# Patient Record
Sex: FEMALE | Race: BLACK OR AFRICAN AMERICAN | NOT HISPANIC OR LATINO | Employment: UNEMPLOYED | URBAN - NONMETROPOLITAN AREA
[De-identification: names, ages, dates, MRNs, and addresses within clinical notes are randomized per-mention and may not be internally consistent; named-entity substitution may affect disease eponyms.]

---

## 2021-12-13 ENCOUNTER — APPOINTMENT (EMERGENCY)
Dept: CT IMAGING | Facility: HOSPITAL | Age: 56
DRG: 720 | End: 2021-12-13
Payer: COMMERCIAL

## 2021-12-13 ENCOUNTER — HOSPITAL ENCOUNTER (INPATIENT)
Facility: HOSPITAL | Age: 56
LOS: 5 days | Discharge: HOME | DRG: 720 | End: 2021-12-18
Attending: EMERGENCY MEDICINE | Admitting: STUDENT IN AN ORGANIZED HEALTH CARE EDUCATION/TRAINING PROGRAM
Payer: COMMERCIAL

## 2021-12-13 ENCOUNTER — APPOINTMENT (EMERGENCY)
Dept: RADIOLOGY | Facility: HOSPITAL | Age: 56
DRG: 720 | End: 2021-12-13
Payer: COMMERCIAL

## 2021-12-13 DIAGNOSIS — N17.9 AKI (ACUTE KIDNEY INJURY) (HCC): ICD-10-CM

## 2021-12-13 DIAGNOSIS — J18.9 PNEUMONIA: ICD-10-CM

## 2021-12-13 DIAGNOSIS — R09.02 HYPOXIA: Primary | ICD-10-CM

## 2021-12-13 DIAGNOSIS — U07.1 COVID-19 VIRUS INFECTION: ICD-10-CM

## 2021-12-13 PROBLEM — J96.01 ACUTE RESPIRATORY FAILURE WITH HYPOXIA (HCC): Status: ACTIVE | Noted: 2021-12-13

## 2021-12-13 PROBLEM — E87.1 HYPONATREMIA: Status: ACTIVE | Noted: 2021-12-13

## 2021-12-13 PROBLEM — A41.9 SEPSIS (HCC): Status: ACTIVE | Noted: 2021-12-13

## 2021-12-13 PROBLEM — J12.82 PNEUMONIA DUE TO COVID-19 VIRUS: Status: ACTIVE | Noted: 2021-12-13

## 2021-12-13 LAB
2HR DELTA HS TROPONIN: 3 NG/L
4HR DELTA HS TROPONIN: -3 NG/L
ALBUMIN SERPL BCP-MCNC: 2.9 G/DL (ref 3.5–5)
ALP SERPL-CCNC: 79 U/L (ref 46–116)
ALT SERPL W P-5'-P-CCNC: 32 U/L (ref 12–78)
ANION GAP SERPL CALCULATED.3IONS-SCNC: 13 MMOL/L (ref 4–13)
ANISOCYTOSIS BLD QL SMEAR: PRESENT
APTT PPP: 34 SECONDS (ref 23–37)
APTT PPP: 47 SECONDS (ref 23–37)
AST SERPL W P-5'-P-CCNC: 49 U/L (ref 5–45)
ATRIAL RATE: 111 BPM
BASOPHILS # BLD MANUAL: 0 THOUSAND/UL (ref 0–0.1)
BASOPHILS NFR MAR MANUAL: 0 % (ref 0–1)
BILIRUB SERPL-MCNC: 0.46 MG/DL (ref 0.2–1)
BUN SERPL-MCNC: 21 MG/DL (ref 5–25)
CALCIUM ALBUM COR SERPL-MCNC: 9.5 MG/DL (ref 8.3–10.1)
CALCIUM SERPL-MCNC: 8.6 MG/DL (ref 8.3–10.1)
CARDIAC TROPONIN I PNL SERPL HS: 25 NG/L
CARDIAC TROPONIN I PNL SERPL HS: 28 NG/L
CARDIAC TROPONIN I PNL SERPL HS: 31 NG/L
CHLORIDE SERPL-SCNC: 98 MMOL/L (ref 100–108)
CK MB SERPL-MCNC: 3.4 NG/ML (ref 0–5)
CK MB SERPL-MCNC: <1 % (ref 0–2.5)
CK SERPL-CCNC: 717 U/L (ref 26–192)
CO2 SERPL-SCNC: 23 MMOL/L (ref 21–32)
CREAT SERPL-MCNC: 1.69 MG/DL (ref 0.6–1.3)
CRP SERPL QL: 130.4 MG/L
D DIMER PPP FEU-MCNC: 3.54 UG/ML FEU
EOSINOPHIL # BLD MANUAL: 0.11 THOUSAND/UL (ref 0–0.4)
EOSINOPHIL NFR BLD MANUAL: 1 % (ref 0–6)
ERYTHROCYTE [DISTWIDTH] IN BLOOD BY AUTOMATED COUNT: 14.8 % (ref 11.6–15.1)
ERYTHROCYTE [DISTWIDTH] IN BLOOD BY AUTOMATED COUNT: 14.8 % (ref 11.6–15.1)
FLUAV RNA RESP QL NAA+PROBE: NEGATIVE
FLUBV RNA RESP QL NAA+PROBE: NEGATIVE
GFR SERPL CREATININE-BSD FRML MDRD: 38 ML/MIN/1.73SQ M
GLUCOSE SERPL-MCNC: 112 MG/DL (ref 65–140)
GLUCOSE SERPL-MCNC: 122 MG/DL (ref 65–140)
GLUCOSE SERPL-MCNC: 131 MG/DL (ref 65–140)
GLUCOSE SERPL-MCNC: 165 MG/DL (ref 65–140)
HBV CORE AB SER QL: NORMAL
HBV CORE IGM SER QL: NORMAL
HBV SURFACE AG SER QL: NORMAL
HCT VFR BLD AUTO: 31.3 % (ref 34.8–46.1)
HCT VFR BLD AUTO: 33 % (ref 34.8–46.1)
HCV AB SER QL: NORMAL
HGB BLD-MCNC: 10.2 G/DL (ref 11.5–15.4)
HGB BLD-MCNC: 10.7 G/DL (ref 11.5–15.4)
HIV 1+2 AB+HIV1 P24 AG SERPL QL IA: NORMAL
HIV1 P24 AG SER QL: NORMAL
INR PPP: 1.15 (ref 0.84–1.19)
INR PPP: 1.23 (ref 0.84–1.19)
LACTATE SERPL-SCNC: 1.4 MMOL/L (ref 0.5–2)
LG PLATELETS BLD QL SMEAR: PRESENT
LYMPHOCYTES # BLD AUTO: 1.37 THOUSAND/UL (ref 0.6–4.47)
LYMPHOCYTES # BLD AUTO: 13 % (ref 14–44)
MAGNESIUM SERPL-MCNC: 1.4 MG/DL (ref 1.6–2.6)
MCH RBC QN AUTO: 29.2 PG (ref 26.8–34.3)
MCH RBC QN AUTO: 29.4 PG (ref 26.8–34.3)
MCHC RBC AUTO-ENTMCNC: 32.4 G/DL (ref 31.4–37.4)
MCHC RBC AUTO-ENTMCNC: 32.6 G/DL (ref 31.4–37.4)
MCV RBC AUTO: 90 FL (ref 82–98)
MCV RBC AUTO: 90 FL (ref 82–98)
MONOCYTES # BLD AUTO: 0.11 THOUSAND/UL (ref 0–1.22)
MONOCYTES NFR BLD: 1 % (ref 4–12)
NEUTROPHILS # BLD MANUAL: 8.97 THOUSAND/UL (ref 1.85–7.62)
NEUTS BAND NFR BLD MANUAL: 3 % (ref 0–8)
NEUTS SEG NFR BLD AUTO: 82 % (ref 43–75)
NT-PROBNP SERPL-MCNC: 211 PG/ML
P AXIS: 73 DEGREES
PLATELET # BLD AUTO: 221 THOUSANDS/UL (ref 149–390)
PLATELET # BLD AUTO: 239 THOUSANDS/UL (ref 149–390)
PLATELET BLD QL SMEAR: ADEQUATE
PMV BLD AUTO: 10 FL (ref 8.9–12.7)
PMV BLD AUTO: 10.3 FL (ref 8.9–12.7)
POTASSIUM SERPL-SCNC: 3.4 MMOL/L (ref 3.5–5.3)
PR INTERVAL: 162 MS
PROCALCITONIN SERPL-MCNC: 0.12 NG/ML
PROT SERPL-MCNC: 7.5 G/DL (ref 6.4–8.2)
PROTHROMBIN TIME: 14.5 SECONDS (ref 11.6–14.5)
PROTHROMBIN TIME: 15.4 SECONDS (ref 11.6–14.5)
QRS AXIS: -49 DEGREES
QRSD INTERVAL: 140 MS
QT INTERVAL: 356 MS
QTC INTERVAL: 484 MS
RBC # BLD AUTO: 3.47 MILLION/UL (ref 3.81–5.12)
RBC # BLD AUTO: 3.67 MILLION/UL (ref 3.81–5.12)
RBC MORPH BLD: PRESENT
RSV RNA RESP QL NAA+PROBE: NEGATIVE
SARS-COV-2 RNA RESP QL NAA+PROBE: POSITIVE
SODIUM SERPL-SCNC: 134 MMOL/L (ref 136–145)
T WAVE AXIS: 62 DEGREES
VENTRICULAR RATE: 111 BPM
WBC # BLD AUTO: 10.55 THOUSAND/UL (ref 4.31–10.16)
WBC # BLD AUTO: 10.81 THOUSAND/UL (ref 4.31–10.16)

## 2021-12-13 PROCEDURE — 0241U HB NFCT DS VIR RESP RNA 4 TRGT: CPT | Performed by: EMERGENCY MEDICINE

## 2021-12-13 PROCEDURE — 82550 ASSAY OF CK (CPK): CPT | Performed by: EMERGENCY MEDICINE

## 2021-12-13 PROCEDURE — 83880 ASSAY OF NATRIURETIC PEPTIDE: CPT | Performed by: EMERGENCY MEDICINE

## 2021-12-13 PROCEDURE — 86803 HEPATITIS C AB TEST: CPT | Performed by: STUDENT IN AN ORGANIZED HEALTH CARE EDUCATION/TRAINING PROGRAM

## 2021-12-13 PROCEDURE — 82553 CREATINE MB FRACTION: CPT | Performed by: EMERGENCY MEDICINE

## 2021-12-13 PROCEDURE — 99223 1ST HOSP IP/OBS HIGH 75: CPT | Performed by: STUDENT IN AN ORGANIZED HEALTH CARE EDUCATION/TRAINING PROGRAM

## 2021-12-13 PROCEDURE — 84145 PROCALCITONIN (PCT): CPT | Performed by: EMERGENCY MEDICINE

## 2021-12-13 PROCEDURE — 87806 HIV AG W/HIV1&2 ANTB W/OPTIC: CPT | Performed by: STUDENT IN AN ORGANIZED HEALTH CARE EDUCATION/TRAINING PROGRAM

## 2021-12-13 PROCEDURE — 80053 COMPREHEN METABOLIC PANEL: CPT | Performed by: EMERGENCY MEDICINE

## 2021-12-13 PROCEDURE — 71045 X-RAY EXAM CHEST 1 VIEW: CPT

## 2021-12-13 PROCEDURE — 86704 HEP B CORE ANTIBODY TOTAL: CPT | Performed by: STUDENT IN AN ORGANIZED HEALTH CARE EDUCATION/TRAINING PROGRAM

## 2021-12-13 PROCEDURE — 85610 PROTHROMBIN TIME: CPT | Performed by: STUDENT IN AN ORGANIZED HEALTH CARE EDUCATION/TRAINING PROGRAM

## 2021-12-13 PROCEDURE — 71275 CT ANGIOGRAPHY CHEST: CPT

## 2021-12-13 PROCEDURE — G1004 CDSM NDSC: HCPCS

## 2021-12-13 PROCEDURE — 87340 HEPATITIS B SURFACE AG IA: CPT | Performed by: STUDENT IN AN ORGANIZED HEALTH CARE EDUCATION/TRAINING PROGRAM

## 2021-12-13 PROCEDURE — 83735 ASSAY OF MAGNESIUM: CPT | Performed by: EMERGENCY MEDICINE

## 2021-12-13 PROCEDURE — 85027 COMPLETE CBC AUTOMATED: CPT | Performed by: EMERGENCY MEDICINE

## 2021-12-13 PROCEDURE — 87040 BLOOD CULTURE FOR BACTERIA: CPT | Performed by: EMERGENCY MEDICINE

## 2021-12-13 PROCEDURE — 87040 BLOOD CULTURE FOR BACTERIA: CPT | Performed by: STUDENT IN AN ORGANIZED HEALTH CARE EDUCATION/TRAINING PROGRAM

## 2021-12-13 PROCEDURE — 99285 EMERGENCY DEPT VISIT HI MDM: CPT

## 2021-12-13 PROCEDURE — 96365 THER/PROPH/DIAG IV INF INIT: CPT

## 2021-12-13 PROCEDURE — 85027 COMPLETE CBC AUTOMATED: CPT | Performed by: STUDENT IN AN ORGANIZED HEALTH CARE EDUCATION/TRAINING PROGRAM

## 2021-12-13 PROCEDURE — 82948 REAGENT STRIP/BLOOD GLUCOSE: CPT

## 2021-12-13 PROCEDURE — 85610 PROTHROMBIN TIME: CPT | Performed by: EMERGENCY MEDICINE

## 2021-12-13 PROCEDURE — 93005 ELECTROCARDIOGRAM TRACING: CPT

## 2021-12-13 PROCEDURE — 85730 THROMBOPLASTIN TIME PARTIAL: CPT | Performed by: STUDENT IN AN ORGANIZED HEALTH CARE EDUCATION/TRAINING PROGRAM

## 2021-12-13 PROCEDURE — XW033E5 INTRODUCTION OF REMDESIVIR ANTI-INFECTIVE INTO PERIPHERAL VEIN, PERCUTANEOUS APPROACH, NEW TECHNOLOGY GROUP 5: ICD-10-PCS | Performed by: FAMILY MEDICINE

## 2021-12-13 PROCEDURE — 84484 ASSAY OF TROPONIN QUANT: CPT | Performed by: EMERGENCY MEDICINE

## 2021-12-13 PROCEDURE — 83605 ASSAY OF LACTIC ACID: CPT | Performed by: EMERGENCY MEDICINE

## 2021-12-13 PROCEDURE — 96375 TX/PRO/DX INJ NEW DRUG ADDON: CPT

## 2021-12-13 PROCEDURE — 85379 FIBRIN DEGRADATION QUANT: CPT | Performed by: EMERGENCY MEDICINE

## 2021-12-13 PROCEDURE — 36415 COLL VENOUS BLD VENIPUNCTURE: CPT | Performed by: EMERGENCY MEDICINE

## 2021-12-13 PROCEDURE — 85007 BL SMEAR W/DIFF WBC COUNT: CPT | Performed by: EMERGENCY MEDICINE

## 2021-12-13 PROCEDURE — 87077 CULTURE AEROBIC IDENTIFY: CPT | Performed by: EMERGENCY MEDICINE

## 2021-12-13 PROCEDURE — 85025 COMPLETE CBC W/AUTO DIFF WBC: CPT | Performed by: EMERGENCY MEDICINE

## 2021-12-13 PROCEDURE — 96361 HYDRATE IV INFUSION ADD-ON: CPT

## 2021-12-13 PROCEDURE — 86140 C-REACTIVE PROTEIN: CPT | Performed by: EMERGENCY MEDICINE

## 2021-12-13 PROCEDURE — 99285 EMERGENCY DEPT VISIT HI MDM: CPT | Performed by: EMERGENCY MEDICINE

## 2021-12-13 PROCEDURE — 86705 HEP B CORE ANTIBODY IGM: CPT | Performed by: STUDENT IN AN ORGANIZED HEALTH CARE EDUCATION/TRAINING PROGRAM

## 2021-12-13 RX ORDER — ACETAMINOPHEN 325 MG/1
650 TABLET ORAL ONCE
Status: COMPLETED | OUTPATIENT
Start: 2021-12-13 | End: 2021-12-13

## 2021-12-13 RX ORDER — GABAPENTIN 100 MG/1
100 CAPSULE ORAL
COMMUNITY
Start: 2021-11-30

## 2021-12-13 RX ORDER — ATORVASTATIN CALCIUM 40 MG/1
40 TABLET, FILM COATED ORAL DAILY
COMMUNITY
Start: 2021-07-01

## 2021-12-13 RX ORDER — PYRIDOXINE HCL (VITAMIN B6) 50 MG
100 TABLET ORAL DAILY
Status: DISCONTINUED | OUTPATIENT
Start: 2021-12-13 | End: 2021-12-18 | Stop reason: HOSPADM

## 2021-12-13 RX ORDER — CEFTRIAXONE 1 G/50ML
1000 INJECTION, SOLUTION INTRAVENOUS ONCE
Status: COMPLETED | OUTPATIENT
Start: 2021-12-13 | End: 2021-12-13

## 2021-12-13 RX ORDER — BENZONATATE 100 MG/1
100 CAPSULE ORAL 3 TIMES DAILY PRN
Status: DISCONTINUED | OUTPATIENT
Start: 2021-12-13 | End: 2021-12-18 | Stop reason: HOSPADM

## 2021-12-13 RX ORDER — LEVOTHYROXINE SODIUM 0.1 MG/1
88 TABLET ORAL DAILY
COMMUNITY

## 2021-12-13 RX ORDER — ATORVASTATIN CALCIUM 40 MG/1
40 TABLET, FILM COATED ORAL DAILY
Status: DISCONTINUED | OUTPATIENT
Start: 2021-12-13 | End: 2021-12-18 | Stop reason: HOSPADM

## 2021-12-13 RX ORDER — DEXAMETHASONE SODIUM PHOSPHATE 4 MG/ML
6 INJECTION, SOLUTION INTRA-ARTICULAR; INTRALESIONAL; INTRAMUSCULAR; INTRAVENOUS; SOFT TISSUE EVERY 24 HOURS
Status: DISCONTINUED | OUTPATIENT
Start: 2021-12-14 | End: 2021-12-18 | Stop reason: HOSPADM

## 2021-12-13 RX ORDER — METOPROLOL SUCCINATE 50 MG/1
50 TABLET, EXTENDED RELEASE ORAL 2 TIMES DAILY
Status: DISCONTINUED | OUTPATIENT
Start: 2021-12-13 | End: 2021-12-14

## 2021-12-13 RX ORDER — TIZANIDINE 4 MG/1
1 TABLET ORAL EVERY 6 HOURS PRN
COMMUNITY
Start: 2021-12-08

## 2021-12-13 RX ORDER — TIZANIDINE 2 MG/1
4 TABLET ORAL EVERY 6 HOURS PRN
Status: DISCONTINUED | OUTPATIENT
Start: 2021-12-13 | End: 2021-12-18 | Stop reason: HOSPADM

## 2021-12-13 RX ORDER — BENZONATATE 100 MG/1
100 CAPSULE ORAL ONCE
Status: COMPLETED | OUTPATIENT
Start: 2021-12-13 | End: 2021-12-13

## 2021-12-13 RX ORDER — DEXAMETHASONE SODIUM PHOSPHATE 4 MG/ML
6 INJECTION, SOLUTION INTRA-ARTICULAR; INTRALESIONAL; INTRAMUSCULAR; INTRAVENOUS; SOFT TISSUE EVERY 24 HOURS
Status: DISCONTINUED | OUTPATIENT
Start: 2021-12-13 | End: 2021-12-13

## 2021-12-13 RX ORDER — DEXAMETHASONE SODIUM PHOSPHATE 4 MG/ML
6 INJECTION, SOLUTION INTRA-ARTICULAR; INTRALESIONAL; INTRAMUSCULAR; INTRAVENOUS; SOFT TISSUE ONCE
Status: COMPLETED | OUTPATIENT
Start: 2021-12-13 | End: 2021-12-13

## 2021-12-13 RX ORDER — HEPARIN SODIUM 10000 [USP'U]/100ML
3-20 INJECTION, SOLUTION INTRAVENOUS
Status: DISCONTINUED | OUTPATIENT
Start: 2021-12-13 | End: 2021-12-18 | Stop reason: HOSPADM

## 2021-12-13 RX ORDER — PYRIDOXINE HCL (VITAMIN B6) 100 MG
1 TABLET ORAL DAILY
COMMUNITY
Start: 2021-06-30

## 2021-12-13 RX ORDER — LEVOTHYROXINE SODIUM 88 UG/1
88 TABLET ORAL
Status: DISCONTINUED | OUTPATIENT
Start: 2021-12-13 | End: 2021-12-18 | Stop reason: HOSPADM

## 2021-12-13 RX ORDER — SODIUM CHLORIDE 9 MG/ML
75 INJECTION, SOLUTION INTRAVENOUS CONTINUOUS
Status: DISCONTINUED | OUTPATIENT
Start: 2021-12-13 | End: 2021-12-15

## 2021-12-13 RX ORDER — METOPROLOL SUCCINATE 25 MG/1
2 TABLET, EXTENDED RELEASE ORAL 2 TIMES DAILY
COMMUNITY
Start: 2021-09-21

## 2021-12-13 RX ORDER — GABAPENTIN 100 MG/1
100 CAPSULE ORAL 4 TIMES DAILY
Status: DISCONTINUED | OUTPATIENT
Start: 2021-12-13 | End: 2021-12-18 | Stop reason: HOSPADM

## 2021-12-13 RX ADMIN — HEPARIN SODIUM 11.1 UNITS/KG/HR: 10000 INJECTION, SOLUTION INTRAVENOUS at 12:45

## 2021-12-13 RX ADMIN — IOHEXOL 100 ML: 350 INJECTION, SOLUTION INTRAVENOUS at 07:29

## 2021-12-13 RX ADMIN — REMDESIVIR 200 MG: 100 INJECTION, POWDER, LYOPHILIZED, FOR SOLUTION INTRAVENOUS at 12:10

## 2021-12-13 RX ADMIN — ATORVASTATIN CALCIUM 40 MG: 40 TABLET, FILM COATED ORAL at 11:21

## 2021-12-13 RX ADMIN — GABAPENTIN 100 MG: 100 CAPSULE ORAL at 17:20

## 2021-12-13 RX ADMIN — SODIUM CHLORIDE 1000 ML: 0.9 INJECTION, SOLUTION INTRAVENOUS at 06:21

## 2021-12-13 RX ADMIN — BENZONATATE 100 MG: 100 CAPSULE ORAL at 06:26

## 2021-12-13 RX ADMIN — INSULIN LISPRO 2 UNITS: 100 INJECTION, SOLUTION INTRAVENOUS; SUBCUTANEOUS at 16:55

## 2021-12-13 RX ADMIN — PYRIDOXINE HCL TAB 50 MG 100 MG: 50 TAB at 12:27

## 2021-12-13 RX ADMIN — GABAPENTIN 100 MG: 100 CAPSULE ORAL at 11:21

## 2021-12-13 RX ADMIN — LEVOTHYROXINE SODIUM 88 MCG: 88 TABLET ORAL at 11:21

## 2021-12-13 RX ADMIN — GABAPENTIN 100 MG: 100 CAPSULE ORAL at 21:01

## 2021-12-13 RX ADMIN — CEFTRIAXONE 1000 MG: 1 INJECTION, SOLUTION INTRAVENOUS at 07:27

## 2021-12-13 RX ADMIN — DOXYCYCLINE 100 MG: 100 INJECTION, POWDER, LYOPHILIZED, FOR SOLUTION INTRAVENOUS at 08:12

## 2021-12-13 RX ADMIN — DEXAMETHASONE SODIUM PHOSPHATE 6 MG: 4 INJECTION, SOLUTION INTRA-ARTICULAR; INTRALESIONAL; INTRAMUSCULAR; INTRAVENOUS; SOFT TISSUE at 06:26

## 2021-12-13 RX ADMIN — SODIUM CHLORIDE 75 ML/HR: 0.9 INJECTION, SOLUTION INTRAVENOUS at 20:55

## 2021-12-13 RX ADMIN — SODIUM CHLORIDE 1000 ML: 0.9 INJECTION, SOLUTION INTRAVENOUS at 07:27

## 2021-12-13 RX ADMIN — ACETAMINOPHEN 650 MG: 325 TABLET ORAL at 06:59

## 2021-12-14 LAB
ALBUMIN SERPL BCP-MCNC: 2.5 G/DL (ref 3.5–5)
ALP SERPL-CCNC: 77 U/L (ref 46–116)
ALT SERPL W P-5'-P-CCNC: 37 U/L (ref 12–78)
ANION GAP SERPL CALCULATED.3IONS-SCNC: 13 MMOL/L (ref 4–13)
APTT PPP: 142 SECONDS (ref 23–37)
APTT PPP: 53 SECONDS (ref 23–37)
APTT PPP: 85 SECONDS (ref 23–37)
APTT PPP: 88 SECONDS (ref 23–37)
AST SERPL W P-5'-P-CCNC: 61 U/L (ref 5–45)
ATRIAL RATE: 84 BPM
ATRIAL RATE: 92 BPM
BASOPHILS # BLD AUTO: 0.02 THOUSANDS/ΜL (ref 0–0.1)
BASOPHILS NFR BLD AUTO: 0 % (ref 0–1)
BILIRUB SERPL-MCNC: 0.28 MG/DL (ref 0.2–1)
BUN SERPL-MCNC: 21 MG/DL (ref 5–25)
CALCIUM ALBUM COR SERPL-MCNC: 9.6 MG/DL (ref 8.3–10.1)
CALCIUM SERPL-MCNC: 8.4 MG/DL (ref 8.3–10.1)
CHLORIDE SERPL-SCNC: 103 MMOL/L (ref 100–108)
CO2 SERPL-SCNC: 22 MMOL/L (ref 21–32)
CREAT SERPL-MCNC: 1.55 MG/DL (ref 0.6–1.3)
CRP SERPL QL: 178.8 MG/L
EOSINOPHIL # BLD AUTO: 0.01 THOUSAND/ΜL (ref 0–0.61)
EOSINOPHIL NFR BLD AUTO: 0 % (ref 0–6)
ERYTHROCYTE [DISTWIDTH] IN BLOOD BY AUTOMATED COUNT: 14.9 % (ref 11.6–15.1)
GFR SERPL CREATININE-BSD FRML MDRD: 42 ML/MIN/1.73SQ M
GLUCOSE SERPL-MCNC: 109 MG/DL (ref 65–140)
GLUCOSE SERPL-MCNC: 132 MG/DL (ref 65–140)
GLUCOSE SERPL-MCNC: 138 MG/DL (ref 65–140)
GLUCOSE SERPL-MCNC: 179 MG/DL (ref 65–140)
GLUCOSE SERPL-MCNC: 195 MG/DL (ref 65–140)
HCT VFR BLD AUTO: 33.5 % (ref 34.8–46.1)
HGB BLD-MCNC: 10.9 G/DL (ref 11.5–15.4)
IMM GRANULOCYTES # BLD AUTO: 0.09 THOUSAND/UL (ref 0–0.2)
IMM GRANULOCYTES NFR BLD AUTO: 1 % (ref 0–2)
LYMPHOCYTES # BLD AUTO: 1.4 THOUSANDS/ΜL (ref 0.6–4.47)
LYMPHOCYTES NFR BLD AUTO: 12 % (ref 14–44)
MCH RBC QN AUTO: 29.6 PG (ref 26.8–34.3)
MCHC RBC AUTO-ENTMCNC: 32.5 G/DL (ref 31.4–37.4)
MCV RBC AUTO: 91 FL (ref 82–98)
MONOCYTES # BLD AUTO: 0.35 THOUSAND/ΜL (ref 0.17–1.22)
MONOCYTES NFR BLD AUTO: 3 % (ref 4–12)
NEUTROPHILS # BLD AUTO: 9.41 THOUSANDS/ΜL (ref 1.85–7.62)
NEUTS SEG NFR BLD AUTO: 84 % (ref 43–75)
NRBC BLD AUTO-RTO: 0 /100 WBCS
P AXIS: 37 DEGREES
P AXIS: 55 DEGREES
PLATELET # BLD AUTO: 249 THOUSANDS/UL (ref 149–390)
PMV BLD AUTO: 10.1 FL (ref 8.9–12.7)
POTASSIUM SERPL-SCNC: 3.5 MMOL/L (ref 3.5–5.3)
PR INTERVAL: 150 MS
PR INTERVAL: 170 MS
PROCALCITONIN SERPL-MCNC: 0.52 NG/ML
PROT SERPL-MCNC: 7.3 G/DL (ref 6.4–8.2)
QRS AXIS: -3 DEGREES
QRS AXIS: -35 DEGREES
QRSD INTERVAL: 116 MS
QRSD INTERVAL: 140 MS
QT INTERVAL: 392 MS
QT INTERVAL: 392 MS
QTC INTERVAL: 463 MS
QTC INTERVAL: 484 MS
RBC # BLD AUTO: 3.68 MILLION/UL (ref 3.81–5.12)
SODIUM SERPL-SCNC: 138 MMOL/L (ref 136–145)
T WAVE AXIS: -3 DEGREES
T WAVE AXIS: 1 DEGREES
VENTRICULAR RATE: 84 BPM
VENTRICULAR RATE: 92 BPM
WBC # BLD AUTO: 11.28 THOUSAND/UL (ref 4.31–10.16)

## 2021-12-14 PROCEDURE — 80053 COMPREHEN METABOLIC PANEL: CPT | Performed by: STUDENT IN AN ORGANIZED HEALTH CARE EDUCATION/TRAINING PROGRAM

## 2021-12-14 PROCEDURE — 86140 C-REACTIVE PROTEIN: CPT | Performed by: STUDENT IN AN ORGANIZED HEALTH CARE EDUCATION/TRAINING PROGRAM

## 2021-12-14 PROCEDURE — 84145 PROCALCITONIN (PCT): CPT | Performed by: EMERGENCY MEDICINE

## 2021-12-14 PROCEDURE — 85730 THROMBOPLASTIN TIME PARTIAL: CPT | Performed by: FAMILY MEDICINE

## 2021-12-14 PROCEDURE — 85730 THROMBOPLASTIN TIME PARTIAL: CPT | Performed by: STUDENT IN AN ORGANIZED HEALTH CARE EDUCATION/TRAINING PROGRAM

## 2021-12-14 PROCEDURE — 82948 REAGENT STRIP/BLOOD GLUCOSE: CPT

## 2021-12-14 PROCEDURE — 99232 SBSQ HOSP IP/OBS MODERATE 35: CPT | Performed by: FAMILY MEDICINE

## 2021-12-14 PROCEDURE — 85025 COMPLETE CBC W/AUTO DIFF WBC: CPT | Performed by: STUDENT IN AN ORGANIZED HEALTH CARE EDUCATION/TRAINING PROGRAM

## 2021-12-14 PROCEDURE — XW0DXM6 INTRODUCTION OF BARICITINIB INTO MOUTH AND PHARYNX, EXTERNAL APPROACH, NEW TECHNOLOGY GROUP 6: ICD-10-PCS | Performed by: FAMILY MEDICINE

## 2021-12-14 RX ORDER — DOXYCYCLINE HYCLATE 100 MG/1
100 CAPSULE ORAL EVERY 12 HOURS SCHEDULED
Status: DISCONTINUED | OUTPATIENT
Start: 2021-12-14 | End: 2021-12-18 | Stop reason: HOSPADM

## 2021-12-14 RX ORDER — ACETAMINOPHEN 325 MG/1
650 TABLET ORAL EVERY 6 HOURS PRN
Status: DISCONTINUED | OUTPATIENT
Start: 2021-12-14 | End: 2021-12-18 | Stop reason: HOSPADM

## 2021-12-14 RX ORDER — CEFTRIAXONE 1 G/50ML
1000 INJECTION, SOLUTION INTRAVENOUS EVERY 24 HOURS
Status: DISCONTINUED | OUTPATIENT
Start: 2021-12-14 | End: 2021-12-18 | Stop reason: HOSPADM

## 2021-12-14 RX ADMIN — DEXAMETHASONE SODIUM PHOSPHATE 6 MG: 4 INJECTION, SOLUTION INTRA-ARTICULAR; INTRALESIONAL; INTRAMUSCULAR; INTRAVENOUS; SOFT TISSUE at 05:27

## 2021-12-14 RX ADMIN — ACETAMINOPHEN 650 MG: 325 TABLET ORAL at 00:07

## 2021-12-14 RX ADMIN — GABAPENTIN 100 MG: 100 CAPSULE ORAL at 21:04

## 2021-12-14 RX ADMIN — HEPARIN SODIUM 14.44 UNITS/KG/HR: 10000 INJECTION, SOLUTION INTRAVENOUS at 11:47

## 2021-12-14 RX ADMIN — GABAPENTIN 100 MG: 100 CAPSULE ORAL at 11:48

## 2021-12-14 RX ADMIN — INSULIN LISPRO 2 UNITS: 100 INJECTION, SOLUTION INTRAVENOUS; SUBCUTANEOUS at 11:54

## 2021-12-14 RX ADMIN — GABAPENTIN 100 MG: 100 CAPSULE ORAL at 17:15

## 2021-12-14 RX ADMIN — CEFTRIAXONE 1000 MG: 1 INJECTION, SOLUTION INTRAVENOUS at 14:35

## 2021-12-14 RX ADMIN — ATORVASTATIN CALCIUM 40 MG: 40 TABLET, FILM COATED ORAL at 09:10

## 2021-12-14 RX ADMIN — BARICITINIB 2 MG: 2 TABLET, FILM COATED ORAL at 14:41

## 2021-12-14 RX ADMIN — DOXYCYCLINE 100 MG: 100 CAPSULE ORAL at 21:18

## 2021-12-14 RX ADMIN — GABAPENTIN 100 MG: 100 CAPSULE ORAL at 09:10

## 2021-12-14 RX ADMIN — HEPARIN SODIUM 15.1 UNITS/KG/HR: 10000 INJECTION, SOLUTION INTRAVENOUS at 00:27

## 2021-12-14 RX ADMIN — LEVOTHYROXINE SODIUM 88 MCG: 88 TABLET ORAL at 05:27

## 2021-12-14 RX ADMIN — DOXYCYCLINE 100 MG: 100 CAPSULE ORAL at 14:35

## 2021-12-14 RX ADMIN — REMDESIVIR 100 MG: 100 INJECTION, POWDER, LYOPHILIZED, FOR SOLUTION INTRAVENOUS at 11:47

## 2021-12-14 RX ADMIN — INSULIN LISPRO 2 UNITS: 100 INJECTION, SOLUTION INTRAVENOUS; SUBCUTANEOUS at 17:16

## 2021-12-14 RX ADMIN — PYRIDOXINE HCL TAB 50 MG 100 MG: 50 TAB at 09:10

## 2021-12-15 PROBLEM — R78.81 BACTEREMIA: Status: ACTIVE | Noted: 2021-12-15

## 2021-12-15 LAB
ALBUMIN SERPL BCP-MCNC: 2.3 G/DL (ref 3.5–5)
ALP SERPL-CCNC: 67 U/L (ref 46–116)
ALT SERPL W P-5'-P-CCNC: 31 U/L (ref 12–78)
ANION GAP SERPL CALCULATED.3IONS-SCNC: 10 MMOL/L (ref 4–13)
APTT PPP: 74 SECONDS (ref 23–37)
AST SERPL W P-5'-P-CCNC: 47 U/L (ref 5–45)
BASOPHILS # BLD AUTO: 0.01 THOUSANDS/ΜL (ref 0–0.1)
BASOPHILS NFR BLD AUTO: 0 % (ref 0–1)
BILIRUB SERPL-MCNC: 0.2 MG/DL (ref 0.2–1)
BUN SERPL-MCNC: 28 MG/DL (ref 5–25)
CALCIUM ALBUM COR SERPL-MCNC: 9.6 MG/DL (ref 8.3–10.1)
CALCIUM SERPL-MCNC: 8.2 MG/DL (ref 8.3–10.1)
CHLORIDE SERPL-SCNC: 110 MMOL/L (ref 100–108)
CO2 SERPL-SCNC: 22 MMOL/L (ref 21–32)
CREAT SERPL-MCNC: 1.4 MG/DL (ref 0.6–1.3)
EOSINOPHIL # BLD AUTO: 0.03 THOUSAND/ΜL (ref 0–0.61)
EOSINOPHIL NFR BLD AUTO: 0 % (ref 0–6)
ERYTHROCYTE [DISTWIDTH] IN BLOOD BY AUTOMATED COUNT: 14.8 % (ref 11.6–15.1)
GFR SERPL CREATININE-BSD FRML MDRD: 41 ML/MIN/1.73SQ M
GLUCOSE SERPL-MCNC: 104 MG/DL (ref 65–140)
GLUCOSE SERPL-MCNC: 119 MG/DL (ref 65–140)
GLUCOSE SERPL-MCNC: 139 MG/DL (ref 65–140)
GLUCOSE SERPL-MCNC: 140 MG/DL (ref 65–140)
GLUCOSE SERPL-MCNC: 216 MG/DL (ref 65–140)
HCT VFR BLD AUTO: 30.2 % (ref 34.8–46.1)
HGB BLD-MCNC: 9.9 G/DL (ref 11.5–15.4)
IMM GRANULOCYTES # BLD AUTO: 0.07 THOUSAND/UL (ref 0–0.2)
IMM GRANULOCYTES NFR BLD AUTO: 1 % (ref 0–2)
LYMPHOCYTES # BLD AUTO: 1.86 THOUSANDS/ΜL (ref 0.6–4.47)
LYMPHOCYTES NFR BLD AUTO: 20 % (ref 14–44)
MCH RBC QN AUTO: 29.8 PG (ref 26.8–34.3)
MCHC RBC AUTO-ENTMCNC: 32.8 G/DL (ref 31.4–37.4)
MCV RBC AUTO: 91 FL (ref 82–98)
MONOCYTES # BLD AUTO: 0.43 THOUSAND/ΜL (ref 0.17–1.22)
MONOCYTES NFR BLD AUTO: 5 % (ref 4–12)
NEUTROPHILS # BLD AUTO: 6.73 THOUSANDS/ΜL (ref 1.85–7.62)
NEUTS SEG NFR BLD AUTO: 74 % (ref 43–75)
NRBC BLD AUTO-RTO: 0 /100 WBCS
PLATELET # BLD AUTO: 253 THOUSANDS/UL (ref 149–390)
PMV BLD AUTO: 9.9 FL (ref 8.9–12.7)
POTASSIUM SERPL-SCNC: 3.7 MMOL/L (ref 3.5–5.3)
PROT SERPL-MCNC: 6.4 G/DL (ref 6.4–8.2)
RBC # BLD AUTO: 3.32 MILLION/UL (ref 3.81–5.12)
SODIUM SERPL-SCNC: 142 MMOL/L (ref 136–145)
WBC # BLD AUTO: 9.13 THOUSAND/UL (ref 4.31–10.16)

## 2021-12-15 PROCEDURE — 85025 COMPLETE CBC W/AUTO DIFF WBC: CPT | Performed by: STUDENT IN AN ORGANIZED HEALTH CARE EDUCATION/TRAINING PROGRAM

## 2021-12-15 PROCEDURE — 85730 THROMBOPLASTIN TIME PARTIAL: CPT | Performed by: STUDENT IN AN ORGANIZED HEALTH CARE EDUCATION/TRAINING PROGRAM

## 2021-12-15 PROCEDURE — 82948 REAGENT STRIP/BLOOD GLUCOSE: CPT

## 2021-12-15 PROCEDURE — 99232 SBSQ HOSP IP/OBS MODERATE 35: CPT | Performed by: FAMILY MEDICINE

## 2021-12-15 PROCEDURE — 80053 COMPREHEN METABOLIC PANEL: CPT | Performed by: STUDENT IN AN ORGANIZED HEALTH CARE EDUCATION/TRAINING PROGRAM

## 2021-12-15 RX ADMIN — GABAPENTIN 100 MG: 100 CAPSULE ORAL at 12:02

## 2021-12-15 RX ADMIN — REMDESIVIR 100 MG: 100 INJECTION, POWDER, LYOPHILIZED, FOR SOLUTION INTRAVENOUS at 12:14

## 2021-12-15 RX ADMIN — HEPARIN SODIUM 12.1 UNITS/KG/HR: 10000 INJECTION, SOLUTION INTRAVENOUS at 11:06

## 2021-12-15 RX ADMIN — PYRIDOXINE HCL TAB 50 MG 100 MG: 50 TAB at 08:21

## 2021-12-15 RX ADMIN — DEXAMETHASONE SODIUM PHOSPHATE 6 MG: 4 INJECTION, SOLUTION INTRA-ARTICULAR; INTRALESIONAL; INTRAMUSCULAR; INTRAVENOUS; SOFT TISSUE at 06:06

## 2021-12-15 RX ADMIN — CEFTRIAXONE 1000 MG: 1 INJECTION, SOLUTION INTRAVENOUS at 13:09

## 2021-12-15 RX ADMIN — GABAPENTIN 100 MG: 100 CAPSULE ORAL at 21:14

## 2021-12-15 RX ADMIN — LEVOTHYROXINE SODIUM 88 MCG: 88 TABLET ORAL at 06:07

## 2021-12-15 RX ADMIN — DOXYCYCLINE 100 MG: 100 CAPSULE ORAL at 21:14

## 2021-12-15 RX ADMIN — BARICITINIB 2 MG: 2 TABLET, FILM COATED ORAL at 13:10

## 2021-12-15 RX ADMIN — SODIUM CHLORIDE 75 ML/HR: 0.9 INJECTION, SOLUTION INTRAVENOUS at 11:00

## 2021-12-15 RX ADMIN — DOXYCYCLINE 100 MG: 100 CAPSULE ORAL at 08:21

## 2021-12-15 RX ADMIN — GABAPENTIN 100 MG: 100 CAPSULE ORAL at 17:32

## 2021-12-15 RX ADMIN — GABAPENTIN 100 MG: 100 CAPSULE ORAL at 08:21

## 2021-12-15 RX ADMIN — ATORVASTATIN CALCIUM 40 MG: 40 TABLET, FILM COATED ORAL at 08:20

## 2021-12-15 RX ADMIN — INSULIN LISPRO 4 UNITS: 100 INJECTION, SOLUTION INTRAVENOUS; SUBCUTANEOUS at 17:32

## 2021-12-16 LAB
ALBUMIN SERPL BCP-MCNC: 2.1 G/DL (ref 3.5–5)
ALP SERPL-CCNC: 65 U/L (ref 46–116)
ALT SERPL W P-5'-P-CCNC: 35 U/L (ref 12–78)
ANION GAP SERPL CALCULATED.3IONS-SCNC: 8 MMOL/L (ref 4–13)
APTT PPP: 110 SECONDS (ref 23–37)
APTT PPP: 59 SECONDS (ref 23–37)
APTT PPP: 71 SECONDS (ref 23–37)
AST SERPL W P-5'-P-CCNC: 36 U/L (ref 5–45)
BILIRUB SERPL-MCNC: 0.17 MG/DL (ref 0.2–1)
BUN SERPL-MCNC: 26 MG/DL (ref 5–25)
CALCIUM ALBUM COR SERPL-MCNC: 9.9 MG/DL (ref 8.3–10.1)
CALCIUM SERPL-MCNC: 8.4 MG/DL (ref 8.3–10.1)
CHLORIDE SERPL-SCNC: 111 MMOL/L (ref 100–108)
CO2 SERPL-SCNC: 24 MMOL/L (ref 21–32)
CREAT SERPL-MCNC: 1.25 MG/DL (ref 0.6–1.3)
CRP SERPL QL: 79.5 MG/L
ERYTHROCYTE [DISTWIDTH] IN BLOOD BY AUTOMATED COUNT: 14.9 % (ref 11.6–15.1)
GFR SERPL CREATININE-BSD FRML MDRD: 47 ML/MIN/1.73SQ M
GLUCOSE SERPL-MCNC: 118 MG/DL (ref 65–140)
GLUCOSE SERPL-MCNC: 123 MG/DL (ref 65–140)
GLUCOSE SERPL-MCNC: 131 MG/DL (ref 65–140)
GLUCOSE SERPL-MCNC: 177 MG/DL (ref 65–140)
GLUCOSE SERPL-MCNC: 205 MG/DL (ref 65–140)
HCT VFR BLD AUTO: 28.6 % (ref 34.8–46.1)
HGB BLD-MCNC: 9.1 G/DL (ref 11.5–15.4)
MCH RBC QN AUTO: 29.2 PG (ref 26.8–34.3)
MCHC RBC AUTO-ENTMCNC: 31.8 G/DL (ref 31.4–37.4)
MCV RBC AUTO: 92 FL (ref 82–98)
NRBC BLD AUTO-RTO: 0 /100 WBCS
PLATELET # BLD AUTO: 248 THOUSANDS/UL (ref 149–390)
PMV BLD AUTO: 9.8 FL (ref 8.9–12.7)
POTASSIUM SERPL-SCNC: 3.7 MMOL/L (ref 3.5–5.3)
PROT SERPL-MCNC: 6.1 G/DL (ref 6.4–8.2)
RBC # BLD AUTO: 3.12 MILLION/UL (ref 3.81–5.12)
SODIUM SERPL-SCNC: 143 MMOL/L (ref 136–145)
WBC # BLD AUTO: 5.9 THOUSAND/UL (ref 4.31–10.16)

## 2021-12-16 PROCEDURE — 85730 THROMBOPLASTIN TIME PARTIAL: CPT | Performed by: STUDENT IN AN ORGANIZED HEALTH CARE EDUCATION/TRAINING PROGRAM

## 2021-12-16 PROCEDURE — 85730 THROMBOPLASTIN TIME PARTIAL: CPT | Performed by: FAMILY MEDICINE

## 2021-12-16 PROCEDURE — 80053 COMPREHEN METABOLIC PANEL: CPT | Performed by: FAMILY MEDICINE

## 2021-12-16 PROCEDURE — 86140 C-REACTIVE PROTEIN: CPT | Performed by: FAMILY MEDICINE

## 2021-12-16 PROCEDURE — 82948 REAGENT STRIP/BLOOD GLUCOSE: CPT

## 2021-12-16 PROCEDURE — 85027 COMPLETE CBC AUTOMATED: CPT | Performed by: FAMILY MEDICINE

## 2021-12-16 PROCEDURE — 99232 SBSQ HOSP IP/OBS MODERATE 35: CPT | Performed by: FAMILY MEDICINE

## 2021-12-16 RX ORDER — METOPROLOL SUCCINATE 25 MG/1
25 TABLET, EXTENDED RELEASE ORAL DAILY
Status: DISCONTINUED | OUTPATIENT
Start: 2021-12-16 | End: 2021-12-18 | Stop reason: HOSPADM

## 2021-12-16 RX ADMIN — LEVOTHYROXINE SODIUM 88 MCG: 88 TABLET ORAL at 05:16

## 2021-12-16 RX ADMIN — ATORVASTATIN CALCIUM 40 MG: 40 TABLET, FILM COATED ORAL at 09:01

## 2021-12-16 RX ADMIN — GABAPENTIN 100 MG: 100 CAPSULE ORAL at 12:18

## 2021-12-16 RX ADMIN — METOPROLOL SUCCINATE 25 MG: 25 TABLET, EXTENDED RELEASE ORAL at 14:50

## 2021-12-16 RX ADMIN — GABAPENTIN 100 MG: 100 CAPSULE ORAL at 17:26

## 2021-12-16 RX ADMIN — BARICITINIB 2 MG: 2 TABLET, FILM COATED ORAL at 14:50

## 2021-12-16 RX ADMIN — PYRIDOXINE HCL TAB 50 MG 100 MG: 50 TAB at 09:01

## 2021-12-16 RX ADMIN — CEFTRIAXONE 1000 MG: 1 INJECTION, SOLUTION INTRAVENOUS at 12:18

## 2021-12-16 RX ADMIN — GABAPENTIN 100 MG: 100 CAPSULE ORAL at 21:00

## 2021-12-16 RX ADMIN — DOXYCYCLINE 100 MG: 100 CAPSULE ORAL at 09:01

## 2021-12-16 RX ADMIN — HEPARIN SODIUM 10.1 UNITS/KG/HR: 10000 INJECTION, SOLUTION INTRAVENOUS at 11:03

## 2021-12-16 RX ADMIN — INSULIN LISPRO 2 UNITS: 100 INJECTION, SOLUTION INTRAVENOUS; SUBCUTANEOUS at 17:26

## 2021-12-16 RX ADMIN — DEXAMETHASONE SODIUM PHOSPHATE 6 MG: 4 INJECTION, SOLUTION INTRA-ARTICULAR; INTRALESIONAL; INTRAMUSCULAR; INTRAVENOUS; SOFT TISSUE at 06:12

## 2021-12-16 RX ADMIN — GABAPENTIN 100 MG: 100 CAPSULE ORAL at 09:01

## 2021-12-16 RX ADMIN — REMDESIVIR 100 MG: 100 INJECTION, POWDER, LYOPHILIZED, FOR SOLUTION INTRAVENOUS at 12:58

## 2021-12-16 RX ADMIN — INSULIN LISPRO 4 UNITS: 100 INJECTION, SOLUTION INTRAVENOUS; SUBCUTANEOUS at 12:19

## 2021-12-16 RX ADMIN — DOXYCYCLINE 100 MG: 100 CAPSULE ORAL at 21:00

## 2021-12-17 LAB
ALBUMIN SERPL BCP-MCNC: 2.2 G/DL (ref 3.5–5)
ALP SERPL-CCNC: 64 U/L (ref 46–116)
ALT SERPL W P-5'-P-CCNC: 35 U/L (ref 12–78)
ANION GAP SERPL CALCULATED.3IONS-SCNC: 10 MMOL/L (ref 4–13)
APTT PPP: 62 SECONDS (ref 23–37)
APTT PPP: 63 SECONDS (ref 23–37)
AST SERPL W P-5'-P-CCNC: 30 U/L (ref 5–45)
BACTERIA BLD CULT: ABNORMAL
BACTERIA BLD CULT: ABNORMAL
BASOPHILS # BLD MANUAL: 0 THOUSAND/UL (ref 0–0.1)
BASOPHILS NFR MAR MANUAL: 0 % (ref 0–1)
BILIRUB SERPL-MCNC: 0.18 MG/DL (ref 0.2–1)
BUN SERPL-MCNC: 32 MG/DL (ref 5–25)
CALCIUM ALBUM COR SERPL-MCNC: 10 MG/DL (ref 8.3–10.1)
CALCIUM SERPL-MCNC: 8.6 MG/DL (ref 8.3–10.1)
CHLORIDE SERPL-SCNC: 108 MMOL/L (ref 100–108)
CO2 SERPL-SCNC: 24 MMOL/L (ref 21–32)
CREAT SERPL-MCNC: 1.31 MG/DL (ref 0.6–1.3)
EOSINOPHIL # BLD MANUAL: 0 THOUSAND/UL (ref 0–0.4)
EOSINOPHIL NFR BLD MANUAL: 0 % (ref 0–6)
ERYTHROCYTE [DISTWIDTH] IN BLOOD BY AUTOMATED COUNT: 14.7 % (ref 11.6–15.1)
GFR SERPL CREATININE-BSD FRML MDRD: 44 ML/MIN/1.73SQ M
GLUCOSE SERPL-MCNC: 122 MG/DL (ref 65–140)
GLUCOSE SERPL-MCNC: 125 MG/DL (ref 65–140)
GLUCOSE SERPL-MCNC: 153 MG/DL (ref 65–140)
GLUCOSE SERPL-MCNC: 155 MG/DL (ref 65–140)
GLUCOSE SERPL-MCNC: 160 MG/DL (ref 65–140)
GRAM STN SPEC: ABNORMAL
GRAM STN SPEC: ABNORMAL
HCT VFR BLD AUTO: 28.4 % (ref 34.8–46.1)
HGB BLD-MCNC: 9.1 G/DL (ref 11.5–15.4)
LYMPHOCYTES # BLD AUTO: 1.62 THOUSAND/UL (ref 0.6–4.47)
LYMPHOCYTES # BLD AUTO: 27 % (ref 14–44)
MCH RBC QN AUTO: 29 PG (ref 26.8–34.3)
MCHC RBC AUTO-ENTMCNC: 32 G/DL (ref 31.4–37.4)
MCV RBC AUTO: 90 FL (ref 82–98)
METAMYELOCYTES NFR BLD MANUAL: 1 % (ref 0–1)
MONOCYTES # BLD AUTO: 0.48 THOUSAND/UL (ref 0–1.22)
MONOCYTES NFR BLD: 8 % (ref 4–12)
NEUTROPHILS # BLD MANUAL: 3.59 THOUSAND/UL (ref 1.85–7.62)
NEUTS SEG NFR BLD AUTO: 60 % (ref 43–75)
PLATELET # BLD AUTO: 276 THOUSANDS/UL (ref 149–390)
PLATELET BLD QL SMEAR: ADEQUATE
PMV BLD AUTO: 9.7 FL (ref 8.9–12.7)
POTASSIUM SERPL-SCNC: 3.7 MMOL/L (ref 3.5–5.3)
PROT SERPL-MCNC: 5.9 G/DL (ref 6.4–8.2)
RBC # BLD AUTO: 3.14 MILLION/UL (ref 3.81–5.12)
RBC MORPH BLD: NORMAL
SODIUM SERPL-SCNC: 142 MMOL/L (ref 136–145)
VARIANT LYMPHS # BLD AUTO: 4 %
WBC # BLD AUTO: 5.99 THOUSAND/UL (ref 4.31–10.16)

## 2021-12-17 PROCEDURE — 85007 BL SMEAR W/DIFF WBC COUNT: CPT | Performed by: FAMILY MEDICINE

## 2021-12-17 PROCEDURE — 82948 REAGENT STRIP/BLOOD GLUCOSE: CPT

## 2021-12-17 PROCEDURE — 94761 N-INVAS EAR/PLS OXIMETRY MLT: CPT

## 2021-12-17 PROCEDURE — 85730 THROMBOPLASTIN TIME PARTIAL: CPT | Performed by: FAMILY MEDICINE

## 2021-12-17 PROCEDURE — 99232 SBSQ HOSP IP/OBS MODERATE 35: CPT | Performed by: FAMILY MEDICINE

## 2021-12-17 PROCEDURE — 85027 COMPLETE CBC AUTOMATED: CPT | Performed by: FAMILY MEDICINE

## 2021-12-17 PROCEDURE — 80053 COMPREHEN METABOLIC PANEL: CPT | Performed by: FAMILY MEDICINE

## 2021-12-17 RX ADMIN — GABAPENTIN 100 MG: 100 CAPSULE ORAL at 12:25

## 2021-12-17 RX ADMIN — GABAPENTIN 100 MG: 100 CAPSULE ORAL at 21:14

## 2021-12-17 RX ADMIN — DEXAMETHASONE SODIUM PHOSPHATE 6 MG: 4 INJECTION, SOLUTION INTRA-ARTICULAR; INTRALESIONAL; INTRAMUSCULAR; INTRAVENOUS; SOFT TISSUE at 06:03

## 2021-12-17 RX ADMIN — DOXYCYCLINE 100 MG: 100 CAPSULE ORAL at 21:14

## 2021-12-17 RX ADMIN — INSULIN LISPRO 2 UNITS: 100 INJECTION, SOLUTION INTRAVENOUS; SUBCUTANEOUS at 12:25

## 2021-12-17 RX ADMIN — GABAPENTIN 100 MG: 100 CAPSULE ORAL at 16:48

## 2021-12-17 RX ADMIN — ATORVASTATIN CALCIUM 40 MG: 40 TABLET, FILM COATED ORAL at 08:59

## 2021-12-17 RX ADMIN — REMDESIVIR 100 MG: 100 INJECTION, POWDER, LYOPHILIZED, FOR SOLUTION INTRAVENOUS at 12:24

## 2021-12-17 RX ADMIN — METOPROLOL SUCCINATE 25 MG: 25 TABLET, EXTENDED RELEASE ORAL at 08:59

## 2021-12-17 RX ADMIN — GABAPENTIN 100 MG: 100 CAPSULE ORAL at 09:00

## 2021-12-17 RX ADMIN — BARICITINIB 2 MG: 2 TABLET, FILM COATED ORAL at 13:32

## 2021-12-17 RX ADMIN — DOXYCYCLINE 100 MG: 100 CAPSULE ORAL at 08:59

## 2021-12-17 RX ADMIN — LEVOTHYROXINE SODIUM 88 MCG: 88 TABLET ORAL at 06:03

## 2021-12-17 RX ADMIN — INSULIN LISPRO 2 UNITS: 100 INJECTION, SOLUTION INTRAVENOUS; SUBCUTANEOUS at 16:47

## 2021-12-17 RX ADMIN — PYRIDOXINE HCL TAB 50 MG 100 MG: 50 TAB at 09:00

## 2021-12-17 RX ADMIN — HEPARIN SODIUM 12.1 UNITS/KG/HR: 10000 INJECTION, SOLUTION INTRAVENOUS at 13:31

## 2021-12-17 RX ADMIN — CEFTRIAXONE 1000 MG: 1 INJECTION, SOLUTION INTRAVENOUS at 13:32

## 2021-12-18 VITALS
OXYGEN SATURATION: 95 % | BODY MASS INDEX: 47.48 KG/M2 | TEMPERATURE: 97.3 F | HEIGHT: 65 IN | SYSTOLIC BLOOD PRESSURE: 114 MMHG | DIASTOLIC BLOOD PRESSURE: 75 MMHG | WEIGHT: 285 LBS | HEART RATE: 70 BPM | RESPIRATION RATE: 19 BRPM

## 2021-12-18 LAB
ANION GAP SERPL CALCULATED.3IONS-SCNC: 10 MMOL/L (ref 4–13)
APTT PPP: 74 SECONDS (ref 23–37)
BACTERIA BLD CULT: NORMAL
BACTERIA BLD CULT: NORMAL
BUN SERPL-MCNC: 33 MG/DL (ref 5–25)
CALCIUM SERPL-MCNC: 8.8 MG/DL (ref 8.3–10.1)
CHLORIDE SERPL-SCNC: 110 MMOL/L (ref 100–108)
CO2 SERPL-SCNC: 25 MMOL/L (ref 21–32)
CREAT SERPL-MCNC: 1.27 MG/DL (ref 0.6–1.3)
ERYTHROCYTE [DISTWIDTH] IN BLOOD BY AUTOMATED COUNT: 15 % (ref 11.6–15.1)
GFR SERPL CREATININE-BSD FRML MDRD: 46 ML/MIN/1.73SQ M
GLUCOSE SERPL-MCNC: 110 MG/DL (ref 65–140)
GLUCOSE SERPL-MCNC: 176 MG/DL (ref 65–140)
GLUCOSE SERPL-MCNC: 86 MG/DL (ref 65–140)
HCT VFR BLD AUTO: 29.5 % (ref 34.8–46.1)
HGB BLD-MCNC: 9.4 G/DL (ref 11.5–15.4)
MCH RBC QN AUTO: 28.7 PG (ref 26.8–34.3)
MCHC RBC AUTO-ENTMCNC: 31.9 G/DL (ref 31.4–37.4)
MCV RBC AUTO: 90 FL (ref 82–98)
NRBC BLD AUTO-RTO: 0 /100 WBCS
PLATELET # BLD AUTO: 297 THOUSANDS/UL (ref 149–390)
PMV BLD AUTO: 9.9 FL (ref 8.9–12.7)
POTASSIUM SERPL-SCNC: 3.4 MMOL/L (ref 3.5–5.3)
RBC # BLD AUTO: 3.28 MILLION/UL (ref 3.81–5.12)
SODIUM SERPL-SCNC: 145 MMOL/L (ref 136–145)
WBC # BLD AUTO: 7.9 THOUSAND/UL (ref 4.31–10.16)

## 2021-12-18 PROCEDURE — 80048 BASIC METABOLIC PNL TOTAL CA: CPT | Performed by: FAMILY MEDICINE

## 2021-12-18 PROCEDURE — 85027 COMPLETE CBC AUTOMATED: CPT | Performed by: FAMILY MEDICINE

## 2021-12-18 PROCEDURE — 82948 REAGENT STRIP/BLOOD GLUCOSE: CPT

## 2021-12-18 PROCEDURE — 99239 HOSP IP/OBS DSCHRG MGMT >30: CPT | Performed by: FAMILY MEDICINE

## 2021-12-18 PROCEDURE — 85730 THROMBOPLASTIN TIME PARTIAL: CPT | Performed by: FAMILY MEDICINE

## 2021-12-18 RX ORDER — POTASSIUM CHLORIDE 20 MEQ/1
20 TABLET, EXTENDED RELEASE ORAL ONCE
Status: COMPLETED | OUTPATIENT
Start: 2021-12-18 | End: 2021-12-18

## 2021-12-18 RX ORDER — DOXYCYCLINE HYCLATE 100 MG/1
100 CAPSULE ORAL EVERY 12 HOURS SCHEDULED
Qty: 6 CAPSULE | Refills: 0 | Status: SHIPPED | OUTPATIENT
Start: 2021-12-18 | End: 2021-12-21

## 2021-12-18 RX ORDER — PREDNISONE 10 MG/1
40 TABLET ORAL DAILY
Qty: 16 TABLET | Refills: 0 | Status: SHIPPED | OUTPATIENT
Start: 2021-12-18 | End: 2021-12-22

## 2021-12-18 RX ORDER — CEFDINIR 300 MG/1
300 CAPSULE ORAL EVERY 12 HOURS SCHEDULED
Qty: 6 CAPSULE | Refills: 0 | Status: SHIPPED | OUTPATIENT
Start: 2021-12-18 | End: 2021-12-21

## 2021-12-18 RX ADMIN — GABAPENTIN 100 MG: 100 CAPSULE ORAL at 11:15

## 2021-12-18 RX ADMIN — DOXYCYCLINE 100 MG: 100 CAPSULE ORAL at 08:40

## 2021-12-18 RX ADMIN — LEVOTHYROXINE SODIUM 88 MCG: 88 TABLET ORAL at 05:34

## 2021-12-18 RX ADMIN — POTASSIUM CHLORIDE 20 MEQ: 1500 TABLET, EXTENDED RELEASE ORAL at 11:15

## 2021-12-18 RX ADMIN — DEXAMETHASONE SODIUM PHOSPHATE 6 MG: 4 INJECTION, SOLUTION INTRA-ARTICULAR; INTRALESIONAL; INTRAMUSCULAR; INTRAVENOUS; SOFT TISSUE at 05:33

## 2021-12-18 RX ADMIN — ATORVASTATIN CALCIUM 40 MG: 40 TABLET, FILM COATED ORAL at 08:40

## 2021-12-18 RX ADMIN — GABAPENTIN 100 MG: 100 CAPSULE ORAL at 08:40

## 2021-12-18 RX ADMIN — INSULIN LISPRO 2 UNITS: 100 INJECTION, SOLUTION INTRAVENOUS; SUBCUTANEOUS at 11:16

## 2021-12-18 RX ADMIN — METOPROLOL SUCCINATE 25 MG: 25 TABLET, EXTENDED RELEASE ORAL at 08:40

## 2021-12-18 RX ADMIN — PYRIDOXINE HCL TAB 50 MG 100 MG: 50 TAB at 08:40

## 2022-10-12 PROBLEM — A41.9 SEPSIS (HCC): Status: RESOLVED | Noted: 2021-12-13 | Resolved: 2022-10-12

## 2022-10-12 PROBLEM — J12.82 PNEUMONIA DUE TO COVID-19 VIRUS: Status: RESOLVED | Noted: 2021-12-13 | Resolved: 2022-10-12

## 2022-10-12 PROBLEM — U07.1 PNEUMONIA DUE TO COVID-19 VIRUS: Status: RESOLVED | Noted: 2021-12-13 | Resolved: 2022-10-12

## 2024-10-07 ENCOUNTER — APPOINTMENT (EMERGENCY)
Dept: MRI IMAGING | Facility: HOSPITAL | Age: 59
DRG: 721 | End: 2024-10-07
Payer: COMMERCIAL

## 2024-10-07 ENCOUNTER — APPOINTMENT (EMERGENCY)
Dept: RADIOLOGY | Facility: HOSPITAL | Age: 59
DRG: 721 | End: 2024-10-07
Payer: COMMERCIAL

## 2024-10-07 ENCOUNTER — APPOINTMENT (EMERGENCY)
Dept: CT IMAGING | Facility: HOSPITAL | Age: 59
DRG: 721 | End: 2024-10-07
Payer: COMMERCIAL

## 2024-10-07 ENCOUNTER — HOSPITAL ENCOUNTER (INPATIENT)
Facility: HOSPITAL | Age: 59
LOS: 7 days | Discharge: HOME HEALTH | DRG: 721 | End: 2024-10-14
Attending: EMERGENCY MEDICINE | Admitting: STUDENT IN AN ORGANIZED HEALTH CARE EDUCATION/TRAINING PROGRAM
Payer: COMMERCIAL

## 2024-10-07 DIAGNOSIS — B95.61 BACTEREMIA DUE TO METHICILLIN SUSCEPTIBLE STAPHYLOCOCCUS AUREUS (MSSA): ICD-10-CM

## 2024-10-07 DIAGNOSIS — R53.1 GENERALIZED WEAKNESS: ICD-10-CM

## 2024-10-07 DIAGNOSIS — R78.81 BACTEREMIA DUE TO METHICILLIN SUSCEPTIBLE STAPHYLOCOCCUS AUREUS (MSSA): ICD-10-CM

## 2024-10-07 DIAGNOSIS — N17.9 AKI (ACUTE KIDNEY INJURY) (HCC): ICD-10-CM

## 2024-10-07 DIAGNOSIS — C79.31 METASTASIS TO BRAIN (HCC): ICD-10-CM

## 2024-10-07 DIAGNOSIS — R78.81 BACTEREMIA: ICD-10-CM

## 2024-10-07 DIAGNOSIS — R55 SYNCOPE: ICD-10-CM

## 2024-10-07 DIAGNOSIS — G93.89 BRAIN MASS: Primary | ICD-10-CM

## 2024-10-07 DIAGNOSIS — Z45.2 PICC (PERIPHERALLY INSERTED CENTRAL CATHETER) IN PLACE: ICD-10-CM

## 2024-10-07 PROBLEM — R53.0 NEOPLASTIC MALIGNANT RELATED FATIGUE: Status: ACTIVE | Noted: 2024-10-07

## 2024-10-07 LAB
ALBUMIN SERPL BCG-MCNC: 3.7 G/DL (ref 3.5–5)
ALP SERPL-CCNC: 185 U/L (ref 34–104)
ALT SERPL W P-5'-P-CCNC: 74 U/L (ref 7–52)
AMORPH URATE CRY URNS QL MICRO: ABNORMAL /HPF
ANION GAP SERPL CALCULATED.3IONS-SCNC: 13 MMOL/L (ref 4–13)
AST SERPL W P-5'-P-CCNC: 52 U/L (ref 13–39)
ATRIAL RATE: 97 BPM
BACTERIA UR QL AUTO: ABNORMAL /HPF
BASOPHILS # BLD AUTO: 0.07 THOUSANDS/ΜL (ref 0–0.1)
BASOPHILS NFR BLD AUTO: 1 % (ref 0–1)
BILIRUB SERPL-MCNC: 0.92 MG/DL (ref 0.2–1)
BILIRUB UR QL STRIP: NEGATIVE
BUN SERPL-MCNC: 29 MG/DL (ref 5–25)
CALCIUM SERPL-MCNC: 9.3 MG/DL (ref 8.4–10.2)
CHLORIDE SERPL-SCNC: 99 MMOL/L (ref 96–108)
CLARITY UR: ABNORMAL
CO2 SERPL-SCNC: 24 MMOL/L (ref 21–32)
COLOR UR: YELLOW
CREAT SERPL-MCNC: 1.93 MG/DL (ref 0.6–1.3)
EOSINOPHIL # BLD AUTO: 0.03 THOUSAND/ΜL (ref 0–0.61)
EOSINOPHIL NFR BLD AUTO: 0 % (ref 0–6)
ERYTHROCYTE [DISTWIDTH] IN BLOOD BY AUTOMATED COUNT: 15.4 % (ref 11.6–15.1)
GFR SERPL CREATININE-BSD FRML MDRD: 27 ML/MIN/1.73SQ M
GLUCOSE SERPL-MCNC: 152 MG/DL (ref 65–140)
GLUCOSE UR STRIP-MCNC: NEGATIVE MG/DL
HCT VFR BLD AUTO: 34.2 % (ref 34.8–46.1)
HGB BLD-MCNC: 11.5 G/DL (ref 11.5–15.4)
HGB UR QL STRIP.AUTO: ABNORMAL
IMM GRANULOCYTES # BLD AUTO: 0.14 THOUSAND/UL (ref 0–0.2)
IMM GRANULOCYTES NFR BLD AUTO: 1 % (ref 0–2)
KETONES UR STRIP-MCNC: NEGATIVE MG/DL
LACTATE SERPL-SCNC: 1.1 MMOL/L (ref 0.5–2)
LEUKOCYTE ESTERASE UR QL STRIP: NEGATIVE
LYMPHOCYTES # BLD AUTO: 1.07 THOUSANDS/ΜL (ref 0.6–4.47)
LYMPHOCYTES NFR BLD AUTO: 8 % (ref 14–44)
MCH RBC QN AUTO: 27.7 PG (ref 26.8–34.3)
MCHC RBC AUTO-ENTMCNC: 33.6 G/DL (ref 31.4–37.4)
MCV RBC AUTO: 82 FL (ref 82–98)
MONOCYTES # BLD AUTO: 0.78 THOUSAND/ΜL (ref 0.17–1.22)
MONOCYTES NFR BLD AUTO: 6 % (ref 4–12)
NEUTROPHILS # BLD AUTO: 11.68 THOUSANDS/ΜL (ref 1.85–7.62)
NEUTS SEG NFR BLD AUTO: 84 % (ref 43–75)
NITRITE UR QL STRIP: NEGATIVE
NON-SQ EPI CELLS URNS QL MICRO: ABNORMAL /HPF
NRBC BLD AUTO-RTO: 0 /100 WBCS
P AXIS: 49 DEGREES
PH UR STRIP.AUTO: 6 [PH]
PLATELET # BLD AUTO: 143 THOUSANDS/UL (ref 149–390)
PMV BLD AUTO: 10 FL (ref 8.9–12.7)
POTASSIUM SERPL-SCNC: 3.7 MMOL/L (ref 3.5–5.3)
PR INTERVAL: 160 MS
PROCALCITONIN SERPL-MCNC: 1.26 NG/ML
PROT SERPL-MCNC: 7.6 G/DL (ref 6.4–8.4)
PROT UR STRIP-MCNC: ABNORMAL MG/DL
QRS AXIS: -31 DEGREES
QRSD INTERVAL: 136 MS
QT INTERVAL: 364 MS
QTC INTERVAL: 462 MS
RBC # BLD AUTO: 4.15 MILLION/UL (ref 3.81–5.12)
RBC #/AREA URNS AUTO: ABNORMAL /HPF
SODIUM SERPL-SCNC: 136 MMOL/L (ref 135–147)
SP GR UR STRIP.AUTO: <=1.005 (ref 1–1.03)
T WAVE AXIS: 16 DEGREES
UROBILINOGEN UR QL STRIP.AUTO: 0.2 E.U./DL
VENTRICULAR RATE: 97 BPM
WBC # BLD AUTO: 13.77 THOUSAND/UL (ref 4.31–10.16)
WBC #/AREA URNS AUTO: ABNORMAL /HPF

## 2024-10-07 PROCEDURE — 83605 ASSAY OF LACTIC ACID: CPT | Performed by: EMERGENCY MEDICINE

## 2024-10-07 PROCEDURE — 87186 SC STD MICRODIL/AGAR DIL: CPT | Performed by: EMERGENCY MEDICINE

## 2024-10-07 PROCEDURE — 96366 THER/PROPH/DIAG IV INF ADDON: CPT

## 2024-10-07 PROCEDURE — 85025 COMPLETE CBC W/AUTO DIFF WBC: CPT | Performed by: EMERGENCY MEDICINE

## 2024-10-07 PROCEDURE — 96361 HYDRATE IV INFUSION ADD-ON: CPT

## 2024-10-07 PROCEDURE — 96365 THER/PROPH/DIAG IV INF INIT: CPT

## 2024-10-07 PROCEDURE — 99223 1ST HOSP IP/OBS HIGH 75: CPT | Performed by: STUDENT IN AN ORGANIZED HEALTH CARE EDUCATION/TRAINING PROGRAM

## 2024-10-07 PROCEDURE — 84145 PROCALCITONIN (PCT): CPT | Performed by: EMERGENCY MEDICINE

## 2024-10-07 PROCEDURE — 87040 BLOOD CULTURE FOR BACTERIA: CPT | Performed by: EMERGENCY MEDICINE

## 2024-10-07 PROCEDURE — 36415 COLL VENOUS BLD VENIPUNCTURE: CPT | Performed by: EMERGENCY MEDICINE

## 2024-10-07 PROCEDURE — 96367 TX/PROPH/DG ADDL SEQ IV INF: CPT

## 2024-10-07 PROCEDURE — 93005 ELECTROCARDIOGRAM TRACING: CPT

## 2024-10-07 PROCEDURE — 87154 CUL TYP ID BLD PTHGN 6+ TRGT: CPT | Performed by: EMERGENCY MEDICINE

## 2024-10-07 PROCEDURE — 99285 EMERGENCY DEPT VISIT HI MDM: CPT

## 2024-10-07 PROCEDURE — 93010 ELECTROCARDIOGRAM REPORT: CPT | Performed by: INTERNAL MEDICINE

## 2024-10-07 PROCEDURE — A9585 GADOBUTROL INJECTION: HCPCS | Performed by: EMERGENCY MEDICINE

## 2024-10-07 PROCEDURE — 80053 COMPREHEN METABOLIC PANEL: CPT | Performed by: EMERGENCY MEDICINE

## 2024-10-07 PROCEDURE — 87147 CULTURE TYPE IMMUNOLOGIC: CPT | Performed by: EMERGENCY MEDICINE

## 2024-10-07 PROCEDURE — 99291 CRITICAL CARE FIRST HOUR: CPT | Performed by: EMERGENCY MEDICINE

## 2024-10-07 PROCEDURE — 81001 URINALYSIS AUTO W/SCOPE: CPT | Performed by: EMERGENCY MEDICINE

## 2024-10-07 PROCEDURE — 96376 TX/PRO/DX INJ SAME DRUG ADON: CPT

## 2024-10-07 PROCEDURE — 70553 MRI BRAIN STEM W/O & W/DYE: CPT

## 2024-10-07 PROCEDURE — 71046 X-RAY EXAM CHEST 2 VIEWS: CPT

## 2024-10-07 PROCEDURE — 96375 TX/PRO/DX INJ NEW DRUG ADDON: CPT

## 2024-10-07 PROCEDURE — 70450 CT HEAD/BRAIN W/O DYE: CPT

## 2024-10-07 RX ORDER — DEXAMETHASONE SODIUM PHOSPHATE 10 MG/ML
10 INJECTION, SOLUTION INTRAMUSCULAR; INTRAVENOUS ONCE
Status: COMPLETED | OUTPATIENT
Start: 2024-10-07 | End: 2024-10-07

## 2024-10-07 RX ORDER — FLUOCINONIDE TOPICAL SOLUTION USP, 0.05% 0.5 MG/ML
1 SOLUTION TOPICAL 2 TIMES DAILY PRN
COMMUNITY

## 2024-10-07 RX ORDER — CEFEPIME HYDROCHLORIDE 2 G/50ML
2000 INJECTION, SOLUTION INTRAVENOUS EVERY 8 HOURS
Status: DISCONTINUED | OUTPATIENT
Start: 2024-10-07 | End: 2024-10-08

## 2024-10-07 RX ORDER — SENNOSIDES 8.6 MG
650 CAPSULE ORAL EVERY 8 HOURS PRN
COMMUNITY

## 2024-10-07 RX ORDER — VANCOMYCIN HYDROCHLORIDE 750 MG/150ML
10 INJECTION, SOLUTION INTRAVENOUS EVERY 24 HOURS
Status: DISCONTINUED | OUTPATIENT
Start: 2024-10-08 | End: 2024-10-08

## 2024-10-07 RX ORDER — SODIUM CHLORIDE 9 MG/ML
250 INJECTION, SOLUTION INTRAVENOUS CONTINUOUS
Status: DISCONTINUED | OUTPATIENT
Start: 2024-10-07 | End: 2024-10-07

## 2024-10-07 RX ORDER — ACETAMINOPHEN 325 MG/1
975 TABLET ORAL EVERY 6 HOURS PRN
Status: DISCONTINUED | OUTPATIENT
Start: 2024-10-07 | End: 2024-10-14 | Stop reason: HOSPADM

## 2024-10-07 RX ORDER — SODIUM CHLORIDE, SODIUM LACTATE, POTASSIUM CHLORIDE, CALCIUM CHLORIDE 600; 310; 30; 20 MG/100ML; MG/100ML; MG/100ML; MG/100ML
125 INJECTION, SOLUTION INTRAVENOUS CONTINUOUS
Status: DISPENSED | OUTPATIENT
Start: 2024-10-07 | End: 2024-10-08

## 2024-10-07 RX ORDER — ONDANSETRON 2 MG/ML
4 INJECTION INTRAMUSCULAR; INTRAVENOUS EVERY 6 HOURS PRN
Status: DISCONTINUED | OUTPATIENT
Start: 2024-10-07 | End: 2024-10-14 | Stop reason: HOSPADM

## 2024-10-07 RX ORDER — CEFEPIME HYDROCHLORIDE 2 G/50ML
2000 INJECTION, SOLUTION INTRAVENOUS ONCE
Status: COMPLETED | OUTPATIENT
Start: 2024-10-07 | End: 2024-10-07

## 2024-10-07 RX ORDER — HYDROMORPHONE HCL/PF 1 MG/ML
0.5 SYRINGE (ML) INJECTION EVERY 2 HOUR PRN
Status: DISCONTINUED | OUTPATIENT
Start: 2024-10-07 | End: 2024-10-14 | Stop reason: HOSPADM

## 2024-10-07 RX ORDER — GADOBUTROL 604.72 MG/ML
13 INJECTION INTRAVENOUS
Status: COMPLETED | OUTPATIENT
Start: 2024-10-07 | End: 2024-10-07

## 2024-10-07 RX ADMIN — CEFEPIME HYDROCHLORIDE 2000 MG: 2 INJECTION, SOLUTION INTRAVENOUS at 10:34

## 2024-10-07 RX ADMIN — DEXAMETHASONE SODIUM PHOSPHATE 10 MG: 10 INJECTION, SOLUTION INTRAMUSCULAR; INTRAVENOUS at 18:06

## 2024-10-07 RX ADMIN — SODIUM CHLORIDE 500 ML: 0.9 INJECTION, SOLUTION INTRAVENOUS at 16:28

## 2024-10-07 RX ADMIN — VANCOMYCIN HYDROCHLORIDE 2000 MG: 1 INJECTION, POWDER, LYOPHILIZED, FOR SOLUTION INTRAVENOUS at 11:06

## 2024-10-07 RX ADMIN — CEFEPIME HYDROCHLORIDE 2000 MG: 2 INJECTION, SOLUTION INTRAVENOUS at 22:15

## 2024-10-07 RX ADMIN — HYDROMORPHONE HYDROCHLORIDE 0.5 MG: 1 INJECTION, SOLUTION INTRAMUSCULAR; INTRAVENOUS; SUBCUTANEOUS at 11:01

## 2024-10-07 RX ADMIN — SODIUM CHLORIDE 1000 ML: 0.9 INJECTION, SOLUTION INTRAVENOUS at 10:34

## 2024-10-07 RX ADMIN — SODIUM CHLORIDE, SODIUM LACTATE, POTASSIUM CHLORIDE, AND CALCIUM CHLORIDE 125 ML/HR: .6; .31; .03; .02 INJECTION, SOLUTION INTRAVENOUS at 23:12

## 2024-10-07 RX ADMIN — HYDROMORPHONE HYDROCHLORIDE 0.5 MG: 1 INJECTION, SOLUTION INTRAMUSCULAR; INTRAVENOUS; SUBCUTANEOUS at 18:06

## 2024-10-07 RX ADMIN — GADOBUTROL 13 ML: 604.72 INJECTION INTRAVENOUS at 17:06

## 2024-10-07 RX ADMIN — HYDROMORPHONE HYDROCHLORIDE 0.5 MG: 1 INJECTION, SOLUTION INTRAMUSCULAR; INTRAVENOUS; SUBCUTANEOUS at 20:06

## 2024-10-07 RX ADMIN — SODIUM CHLORIDE 250 ML/HR: 0.9 INJECTION, SOLUTION INTRAVENOUS at 16:28

## 2024-10-07 NOTE — ED PROVIDER NOTES
Final diagnoses:   Brain mass - likely metastasis, ovarian cancer   Generalized weakness - Concern for infected port   Syncope   DIANA (acute kidney injury) (HCC)     ED Disposition       ED Disposition   Admit    Condition   Stable    Date/Time   Mon Oct 7, 2024  8:51 PM    Comment   Case was discussed with Nataliya and the patient's admission status was agreed to be Admission Status: inpatient status to the service of Dr. An.               Assessment & Plan       Medical Decision Making  This patient presents with generalized weakness, syncopal episodes, headache, port area pain.   Diagnostic considerations include intracranial hemorrhage, subdural hematoma, sepsis, infected port, dysrhythmia. See ED Course.       Amount and/or Complexity of Data Reviewed  Labs: ordered. Decision-making details documented in ED Course.  Radiology: ordered and independent interpretation performed. Decision-making details documented in ED Course.  ECG/medicine tests: ordered and independent interpretation performed. Decision-making details documented in ED Course.    Risk  Prescription drug management.  Decision regarding hospitalization.        ED Course as of 10/07/24 2053   Mon Oct 07, 2024   1015 [facility] ESC, can manage line at [facility] or IR   1017 EKG 8:51 AM normal sinus rhythm rate 97 left axis right bundle branch block T wave inversion V1 lead III no ST elevation or depression interpreted by me   1018 Leukocytes, UA: Negative   1018 Nitrite, UA: Negative   1018 BUN(!): 29   1018 Creatinine(!): 1.93   1018 WBC(!): 13.77   1019 LACTIC ACID: 1.1   1058 RAD Wily CT right frontal low density, concern for hemorrhagic contusion, septic emboli can MRI with contrast   1119 WBC, UA: 0-5   1119 Bacteria, UA(!): Moderate   1119 Procalcitonin(!): 1.26   1155 LACTIC ACID: 1.1   1155 MR pending disposition, possible transfer   1549 AMORPH URATES: Occasional   1555 MRI planning to scan   1629 MRI to proceed with GFR, initially 27,  received bolus, repeating IVB and adding rate   1803 Patient informed of likely metastatic lesion and agreeable to transfer to [facility]    ONC Gonzalez aware   1923 PAC contacting [facility]   2001 PAC [facility] [place] discussing with oncology about patient remaining at [facility]   2050 PAC Notes [facility] conferred with ONC and request [facility] admission, [facility] agreeable       Medications   HYDROmorphone (DILAUDID) injection 0.5 mg (0.5 mg Intravenous Given 10/7/24 2006)   sodium chloride 0.9 % infusion (250 mL/hr Intravenous New Bag 10/7/24 1628)   sodium chloride 0.9 % bolus 1,000 mL (0 mL Intravenous Stopped 10/7/24 1134)   cefepime (MAXIPIME) IVPB (premix in dextrose) 2,000 mg 50 mL (0 mg Intravenous Stopped 10/7/24 1100)   vancomycin (VANCOCIN) 2,000 mg in sodium chloride 0.9 % 500 mL IVPB (0 mg Intravenous Stopped 10/7/24 1238)   sodium chloride 0.9 % bolus 500 mL (0 mL Intravenous Stopped 10/7/24 1838)   Gadobutrol injection (SINGLE-DOSE) SOLN 13 mL (13 mL Intravenous Given 10/7/24 1706)   dexamethasone (PF) (DECADRON) injection 10 mg (10 mg Intravenous Given 10/7/24 1806)       ED Risk Strat Scores                           SBIRT 20yo+      Flowsheet Row Most Recent Value   Initial Alcohol Screen: US AUDIT-C     1. How often do you have a drink containing alcohol? 0 Filed at: 10/07/2024 0849   2. How many drinks containing alcohol do you have on a typical day you are drinking?  0 Filed at: 10/07/2024 0849   3b. FEMALE Any Age, or MALE 65+: How often do you have 4 or more drinks on one occassion? 0 Filed at: 10/07/2024 0849   Audit-C Score 0 Filed at: 10/07/2024 0849   JULIO: How many times in the past year have you...    Used an illegal drug or used a prescription medication for non-medical reasons? Never Filed at: 10/07/2024 0849                            History of Present Illness       Chief Complaint   Patient presents with    Weakness - Generalized     Pt had chemo treatment on 9/24, states that since then they have been having  worsening weakness- weakness causing patient to have multiple falls at home, +HS/-LOC/-BT, pt reports head and back pain- states two days ago they started with cough and SOB        Past Medical History:   Diagnosis Date    Diabetes mellitus (HCC)     Hypercholesterolemia     Hypertension       History reviewed. No pertinent surgical history.   History reviewed. No pertinent family history.   Social History     Tobacco Use    Smoking status: Never    Smokeless tobacco: Never   Substance Use Topics    Alcohol use: Not Currently    Drug use: Never      E-Cigarette/Vaping      E-Cigarette/Vaping Substances      I have reviewed and agree with the history as documented.     58-year-old female presents via EMS secondary to worsening generalized weakness over the past week, notes multiple falls, last known 4 days ago with persistent headache.  Patient currently under care of Dr. Gonzalez for recurrent ovarian cancer, last chemotherapy 9/24 with left chest/neck line placement 916 at [facility], notes it is reddened, swollen and painful.      History provided by:  Patient  Syncope  Episode history:  Multiple  Timing:  Intermittent  Progression:  Worsening  Chronicity:  New  Context: dehydration    Witnessed: no    Relieved by:  None tried  Worsened by:  Nothing  Ineffective treatments:  None tried  Associated symptoms: headaches    Associated symptoms: no chest pain and no shortness of breath    Risk factors: no coronary artery disease        Review of Systems   Respiratory:  Negative for shortness of breath.    Cardiovascular:  Positive for syncope. Negative for chest pain.   Neurological:  Positive for headaches.   All other systems reviewed and are negative.          Objective       ED Triage Vitals   Temperature Pulse Blood Pressure Respirations SpO2 Patient Position - Orthostatic VS   10/07/24 0850 10/07/24 0850 10/07/24 0850 10/07/24 0850 10/07/24 0850 10/07/24 0850   99.8 °F (37.7 °C) 94 141/71 18 90 % Sitting      Temp  Source Heart Rate Source BP Location FiO2 (%) Pain Score    10/07/24 0850 10/07/24 0850 10/07/24 0850 -- 10/07/24 1101    Temporal Monitor Left arm  6      Vitals      Date and Time Temp Pulse SpO2 Resp BP Pain Score FACES Pain Rating User   10/07/24 2006 -- -- -- -- -- 5 --    10/07/24 2000 -- 94 90 % 20 146/70 -- --    10/07/24 1835 -- -- -- -- -- 4 -- MD   10/07/24 1815 -- 97 94 % 28 139/74 -- -- MD   10/07/24 1806 -- -- -- -- -- 8 -- MD   10/07/24 1630 -- 100 94 % 22 168/84 -- -- MB   10/07/24 1500 -- 95 93 % 22 159/76 -- -- MD   10/07/24 1330 -- 92 94 % 28 165/68 -- -- MD   10/07/24 1238 -- -- 97 % -- -- -- -- MD   10/07/24 1132 -- 89 94 % 24 161/85 -- -- MD   10/07/24 1131 -- -- -- -- -- 4 -- MD   10/07/24 1101 -- -- -- -- -- 6 -- MD   10/07/24 1045 -- 89 94 % 33 142/83 -- -- MD   10/07/24 1035 -- 90 94 % -- 144/82 -- -- MD   10/07/24 1000 -- 95 96 % 25 145/80 -- -- MB   10/07/24 0930 -- 90 95 % 27 131/77 -- -- MD   10/07/24 0915 -- 91 93 % 24 134/75 -- -- MD   10/07/24 0900 -- 90 92 % 17 138/72 -- -- MD   10/07/24 0850 99.8 °F (37.7 °C) 94 90 % 18 141/71 -- -- SS            Physical Exam  Vitals and nursing note reviewed.   Constitutional:       General: She is not in acute distress.     Appearance: Normal appearance. She is obese. She is not ill-appearing or toxic-appearing.      Comments: Pleasant, comfortable appearing, conversational, articulate   HENT:      Head: Normocephalic and atraumatic.      Right Ear: External ear normal.      Left Ear: External ear normal.      Nose: Nose normal.      Mouth/Throat:      Mouth: Mucous membranes are moist.   Eyes:      Extraocular Movements: Extraocular movements intact.      Pupils: Pupils are equal, round, and reactive to light.   Cardiovascular:      Rate and Rhythm: Normal rate and regular rhythm.      Heart sounds: No murmur heard.  Pulmonary:      Effort: Pulmonary effort is normal. No respiratory distress.      Breath sounds: Normal breath sounds. No  wheezing or rales.   Abdominal:      General: Abdomen is flat. Bowel sounds are normal. There is no distension.      Tenderness: There is no guarding or rebound.   Musculoskeletal:      Cervical back: Neck supple.      Right lower leg: No edema.      Left lower leg: No edema.   Skin:     General: Skin is warm and dry.      Comments: Left neck/chest port locally tender, erythematous, glue at skin, superior aspect access point is little swollen, erythematous, tiny healing wound overlying   Neurological:      General: No focal deficit present.      Mental Status: She is alert and oriented to person, place, and time.      Cranial Nerves: No cranial nerve deficit.      Sensory: No sensory deficit.      Motor: No weakness.      Coordination: Coordination normal.   Psychiatric:         Mood and Affect: Mood normal.         Behavior: Behavior normal.             Results Reviewed       Procedure Component Value Units Date/Time    Blood culture #1 [[id number]] Collected: 10/07/24 0930    Lab Status: Preliminary result Specimen: Blood from Arm, Right Updated: 10/07/24 1401     Blood Culture Received in Microbiology Lab. Culture in Progress.    Urine Microscopic [[id number]]  (Abnormal) Collected: 10/07/24 0957    Lab Status: Final result Specimen: Urine, Straight Cath Updated: 10/07/24 1019     RBC, UA 0-1 /hpf      WBC, UA 0-5 /hpf      Epithelial Cells Occasional /hpf      Bacteria, UA Moderate /hpf      AMORPH URATES Occasional /hpf     Procalcitonin [[id number]]  (Abnormal) Collected: 10/07/24 0930    Lab Status: Final result Specimen: Blood from Arm, Right Updated: 10/07/24 1010     Procalcitonin 1.26 ng/ml     UA w Reflex to Microscopic w Reflex to Culture [[id number]]  (Abnormal) Collected: 10/07/24 0957    Lab Status: Final result Specimen: Urine, Straight Cath Updated: 10/07/24 1009     Color, UA Yellow     Clarity, UA Slightly Cloudy     Specific Gravity, UA <=1.005     pH, UA 6.0     Leukocytes, UA Negative     Nitrite,  UA Negative     Protein, UA 30 (1+) mg/dl      Glucose, UA Negative mg/dl      Ketones, UA Negative mg/dl      Urobilinogen, UA 0.2 E.U./dl      Bilirubin, UA Negative     Occult Blood, UA Moderate    Blood culture #2 [[id number]] Collected: 10/07/24 0957    Lab Status: In process Specimen: Blood from Arm, Right Updated: 10/07/24 1005    Comprehensive metabolic panel [[id number]]  (Abnormal) Collected: 10/07/24 0930    Lab Status: Final result Specimen: Blood from Arm, Right Updated: 10/07/24 1005     Sodium 136 mmol/L      Potassium 3.7 mmol/L      Chloride 99 mmol/L      CO2 24 mmol/L      ANION GAP 13 mmol/L      BUN 29 mg/dL      Creatinine 1.93 mg/dL      Glucose 152 mg/dL      Calcium 9.3 mg/dL      AST 52 U/L      ALT 74 U/L      Alkaline Phosphatase 185 U/L      Total Protein 7.6 g/dL      Albumin 3.7 g/dL      Total Bilirubin 0.92 mg/dL      eGFR 27 ml/min/1.73sq m     Narrative:      National Kidney Disease Foundation guidelines for Chronic Kidney Disease (CKD):     Stage 1 with normal or high GFR (GFR > 90 mL/min/1.73 square meters)    Stage 2 Mild CKD (GFR = 60-89 mL/min/1.73 square meters)    Stage 3A Moderate CKD (GFR = 45-59 mL/min/1.73 square meters)    Stage 3B Moderate CKD (GFR = 30-44 mL/min/1.73 square meters)    Stage 4 Severe CKD (GFR = 15-29 mL/min/1.73 square meters)    Stage 5 End Stage CKD (GFR <15 mL/min/1.73 square meters)  Note: GFR calculation is accurate only with a steady state creatinine    Lactic acid, plasma (w/reflex if result > 2.0) [[id number]]  (Normal) Collected: 10/07/24 0930    Lab Status: Final result Specimen: Blood from Arm, Right Updated: 10/07/24 1004     LACTIC ACID 1.1 mmol/L     Narrative:      Result may be elevated if tourniquet was used during collection.    CBC and differential [[id number]]  (Abnormal) Collected: 10/07/24 0930    Lab Status: Final result Specimen: Blood from Arm, Right Updated: 10/07/24 0939     WBC 13.77 Thousand/uL      RBC 4.15 Million/uL       Hemoglobin 11.5 g/dL      Hematocrit 34.2 %      MCV 82 fL      MCH 27.7 pg      MCHC 33.6 g/dL      RDW 15.4 %      MPV 10.0 fL      Platelets 143 Thousands/uL      nRBC 0 /100 WBCs      Segmented % 84 %      Immature Grans % 1 %      Lymphocytes % 8 %      Monocytes % 6 %      Eosinophils Relative 0 %      Basophils Relative 1 %      Absolute Neutrophils 11.68 Thousands/µL      Absolute Immature Grans 0.14 Thousand/uL      Absolute Lymphocytes 1.07 Thousands/µL      Absolute Monocytes 0.78 Thousand/µL      Eosinophils Absolute 0.03 Thousand/µL      Basophils Absolute 0.07 Thousands/µL             MRI brain w wo contrast   Final Interpretation by E. Alec Schoenberger, MD (10/07 1319)      1.3 cm intra-axial mass in the anterior right frontal lobe with mild surrounding vasogenic edema that likely represents metastasis from known ovarian cancer.   No other mass or abnormal enhancement.      The study was marked in EPIC for immediate notification.      Workstation performed: [id number]         CT head without contrast   Final Interpretation by Ike Julien DO (10/07 1100)      Within the anterior inferior right frontal lobe there is focal heterogeneity including decreased attenuation suggestive of vasogenic edema. MRI of the brain with contrast recommended. See above discussion regarding differential considerations.         I personally discussed this study with WILBER LYNN on 10/7/2024 11:00 AM.                        Resident: Magdaleno Scales I, the attending radiologist, have reviewed the images and agree with the final report above.      Workstation performed: [id number]         XR chest pa and lateral   Final Interpretation by Aubree Tomlin MD (10/07 1025)      Low lung volumes producing vascular crowding. No definite pneumonia.            Workstation performed: [id number]             Procedures    Critical care time 30 minutes not including billable procedures, treating other  patients and teaching      ED Medication and Procedure Management   Prior to Admission Medications   Prescriptions Last Dose Informant Patient Reported? Taking?   atorvastatin (LIPITOR) 40 mg tablet   Yes No   Sig: Take 40 mg by mouth daily   bisacodyl (DULCOLAX) 5 mg EC tablet   Yes No   Sig: Take 10 mg by mouth daily as needed   gabapentin (NEURONTIN) 100 mg capsule   Yes No   Sig: Take 100 mg by mouth   levothyroxine 100 mcg tablet   Yes No   Sig: Take 88 mcg by mouth daily   metFORMIN (GLUCOPHAGE) 850 mg tablet   Yes No   Sig: Take 1 tablet by mouth 2 (two) times a day with meals   metoprolol succinate (TOPROL-XL) 25 mg 24 hr tablet   Yes No   Sig: Take 2 tablets by mouth 2 (two) times a day   pyridoxine (B-6) 100 MG tablet   Yes No   Sig: Take 1 tablet by mouth daily   tiZANidine (ZANAFLEX) 4 mg tablet   Yes No   Sig: Take 1 tablet by mouth every 6 (six) hours as needed      Facility-Administered Medications: None     Patient's Medications   Discharge Prescriptions    No medications on file     No discharge procedures on file.  ED SEPSIS DOCUMENTATION   Time reflects when diagnosis was documented in both MDM as applicable and the Disposition within this note       Time User Action Codes Description Comment    10/7/2024  5:58 PM Ino Grider Add [G93.89] Brain mass     10/7/2024  5:59 PM Ino Grider Add [R53.1] Generalized weakness     10/7/2024  5:59 PM Ino Grider Add [R55] Syncope     10/7/2024  5:59 PM Ino Grider Modify [R53.1] Generalized weakness Concern for infected port    10/7/2024  8:51 PM Ino Grider Modify [G93.89] Brain mass likely metastasis, ovarian cancer    10/7/2024  8:52 PM Ino Grider Add [N17.9] DIANA (acute kidney injury) (HCC)                  Ino Grider DO  10/07/24 2053

## 2024-10-08 ENCOUNTER — APPOINTMENT (INPATIENT)
Dept: NON INVASIVE DIAGNOSTICS | Facility: HOSPITAL | Age: 59
DRG: 721 | End: 2024-10-08
Payer: COMMERCIAL

## 2024-10-08 ENCOUNTER — APPOINTMENT (INPATIENT)
Dept: CT IMAGING | Facility: HOSPITAL | Age: 59
DRG: 721 | End: 2024-10-08
Payer: COMMERCIAL

## 2024-10-08 PROBLEM — E66.01 MORBID OBESITY (HCC): Status: ACTIVE | Noted: 2024-10-08

## 2024-10-08 PROBLEM — R74.01 TRANSAMINITIS: Status: ACTIVE | Noted: 2024-10-08

## 2024-10-08 PROBLEM — B95.61 BACTEREMIA DUE TO METHICILLIN SUSCEPTIBLE STAPHYLOCOCCUS AUREUS (MSSA): Status: ACTIVE | Noted: 2021-12-15

## 2024-10-08 LAB
ALBUMIN SERPL BCG-MCNC: 3.3 G/DL (ref 3.5–5)
ALP SERPL-CCNC: 178 U/L (ref 34–104)
ALT SERPL W P-5'-P-CCNC: 91 U/L (ref 7–52)
ANION GAP SERPL CALCULATED.3IONS-SCNC: 11 MMOL/L (ref 4–13)
AORTIC ROOT: 2.7 CM
APICAL FOUR CHAMBER EJECTION FRACTION: 58 %
ASCENDING AORTA: 2.9 CM
AST SERPL W P-5'-P-CCNC: 63 U/L (ref 13–39)
BILIRUB SERPL-MCNC: 0.7 MG/DL (ref 0.2–1)
BSA FOR ECHO PROCEDURE: 2.33 M2
BUN SERPL-MCNC: 24 MG/DL (ref 5–25)
CALCIUM ALBUM COR SERPL-MCNC: 9 MG/DL (ref 8.3–10.1)
CALCIUM SERPL-MCNC: 8.4 MG/DL (ref 8.4–10.2)
CHLORIDE SERPL-SCNC: 109 MMOL/L (ref 96–108)
CO2 SERPL-SCNC: 20 MMOL/L (ref 21–32)
CREAT SERPL-MCNC: 1.35 MG/DL (ref 0.6–1.3)
E WAVE DECELERATION TIME: 115 MS
E/A RATIO: 0.77
FRACTIONAL SHORTENING: 45 (ref 28–44)
GFR SERPL CREATININE-BSD FRML MDRD: 42 ML/MIN/1.73SQ M
GLUCOSE SERPL-MCNC: 179 MG/DL (ref 65–140)
INTERVENTRICULAR SEPTUM IN DIASTOLE (PARASTERNAL SHORT AXIS VIEW): 1.2 CM
INTERVENTRICULAR SEPTUM: 1.4 CM (ref 0.6–1.1)
LAAS-AP2: 16.8 CM2
LAAS-AP4: 14.4 CM2
LEFT ATRIUM SIZE: 4 CM
LEFT ATRIUM VOLUME (MOD BIPLANE): 41 ML
LEFT ATRIUM VOLUME INDEX (MOD BIPLANE): 17.6 ML/M2
LEFT INTERNAL DIMENSION IN SYSTOLE: 2.6 CM (ref 2.1–4)
LEFT VENTRICULAR INTERNAL DIMENSION IN DIASTOLE: 4.7 CM (ref 3.5–6)
LEFT VENTRICULAR POSTERIOR WALL IN END DIASTOLE: 1.1 CM
LEFT VENTRICULAR STROKE VOLUME: 78 ML
LVSV (TEICH): 78 ML
MAGNESIUM SERPL-MCNC: 2 MG/DL (ref 1.9–2.7)
MV E'TISSUE VEL-SEP: 9 CM/S
MV PEAK A VEL: 0.79 M/S
MV PEAK E VEL: 61 CM/S
MV STENOSIS PRESSURE HALF TIME: 33 MS
MV VALVE AREA P 1/2 METHOD: 6.67
PHOSPHATE SERPL-MCNC: 3.7 MG/DL (ref 2.3–4.1)
POTASSIUM SERPL-SCNC: 4 MMOL/L (ref 3.5–5.3)
PROT SERPL-MCNC: 6.5 G/DL (ref 6.4–8.4)
RIGHT ATRIUM AREA SYSTOLE A4C: 12.9 CM2
RIGHT VENTRICLE ID DIMENSION: 2.6 CM
SL CV LEFT ATRIUM LENGTH A2C: 5 CM
SL CV PED ECHO LEFT VENTRICLE DIASTOLIC VOLUME (MOD BIPLANE) 2D: 102 ML
SL CV PED ECHO LEFT VENTRICLE SYSTOLIC VOLUME (MOD BIPLANE) 2D: 24 ML
SODIUM SERPL-SCNC: 140 MMOL/L (ref 135–147)
TRICUSPID ANNULAR PLANE SYSTOLIC EXCURSION: 2.2 CM

## 2024-10-08 PROCEDURE — 83735 ASSAY OF MAGNESIUM: CPT | Performed by: STUDENT IN AN ORGANIZED HEALTH CARE EDUCATION/TRAINING PROGRAM

## 2024-10-08 PROCEDURE — NC001 PR NO CHARGE: Performed by: RADIOLOGY

## 2024-10-08 PROCEDURE — 93306 TTE W/DOPPLER COMPLETE: CPT

## 2024-10-08 PROCEDURE — 71250 CT THORAX DX C-: CPT

## 2024-10-08 PROCEDURE — 74176 CT ABD & PELVIS W/O CONTRAST: CPT

## 2024-10-08 PROCEDURE — 93971 EXTREMITY STUDY: CPT

## 2024-10-08 PROCEDURE — 84100 ASSAY OF PHOSPHORUS: CPT | Performed by: STUDENT IN AN ORGANIZED HEALTH CARE EDUCATION/TRAINING PROGRAM

## 2024-10-08 PROCEDURE — 97162 PT EVAL MOD COMPLEX 30 MIN: CPT

## 2024-10-08 PROCEDURE — 93971 EXTREMITY STUDY: CPT | Performed by: SURGERY

## 2024-10-08 PROCEDURE — 97530 THERAPEUTIC ACTIVITIES: CPT

## 2024-10-08 PROCEDURE — 99233 SBSQ HOSP IP/OBS HIGH 50: CPT | Performed by: FAMILY MEDICINE

## 2024-10-08 PROCEDURE — 99255 IP/OBS CONSLTJ NEW/EST HI 80: CPT | Performed by: INTERNAL MEDICINE

## 2024-10-08 PROCEDURE — 87493 C DIFF AMPLIFIED PROBE: CPT | Performed by: FAMILY MEDICINE

## 2024-10-08 PROCEDURE — 93306 TTE W/DOPPLER COMPLETE: CPT | Performed by: INTERNAL MEDICINE

## 2024-10-08 PROCEDURE — 80053 COMPREHEN METABOLIC PANEL: CPT | Performed by: STUDENT IN AN ORGANIZED HEALTH CARE EDUCATION/TRAINING PROGRAM

## 2024-10-08 RX ORDER — CEFAZOLIN SODIUM 2 G/50ML
2000 SOLUTION INTRAVENOUS EVERY 6 HOURS
Status: DISCONTINUED | OUTPATIENT
Start: 2024-10-08 | End: 2024-10-14 | Stop reason: HOSPADM

## 2024-10-08 RX ADMIN — CEFAZOLIN SODIUM 2000 MG: 2 SOLUTION INTRAVENOUS at 09:39

## 2024-10-08 RX ADMIN — CEFEPIME HYDROCHLORIDE 2000 MG: 2 INJECTION, SOLUTION INTRAVENOUS at 05:28

## 2024-10-08 RX ADMIN — SODIUM CHLORIDE, SODIUM LACTATE, POTASSIUM CHLORIDE, AND CALCIUM CHLORIDE 125 ML/HR: .6; .31; .03; .02 INJECTION, SOLUTION INTRAVENOUS at 08:10

## 2024-10-08 RX ADMIN — CEFAZOLIN SODIUM 2000 MG: 2 SOLUTION INTRAVENOUS at 21:00

## 2024-10-08 RX ADMIN — CEFAZOLIN SODIUM 2000 MG: 2 SOLUTION INTRAVENOUS at 15:38

## 2024-10-08 NOTE — ASSESSMENT & PLAN NOTE
S/p chemo, LEIDA-BSO in 2021. Now with recurrent disease, retroperitoneal lymphadenopathy. Port placed 9/16, received first treatment of paclitaxel/carboplatin on 9/24. Concern for port site infection and brain mets as noted above     Recommend port removal    Additional mngt per oncology, consider neurosurgery evaluation

## 2024-10-08 NOTE — ASSESSMENT & PLAN NOTE
Leukocytosis, tachycardia, tachypnea. Due to MSSA bacteremia, port infection. Hemodynamically stable, afebrile but with persistent leukocytosis     Continue supportive care   Check daily CBC, CMP while inpatient to monitor for any evolving antibiotic toxicity or treatment failure   Antibiotics and additional management as below   Monitor stool output with new diarrhea and fu stool for c diff testing

## 2024-10-08 NOTE — ASSESSMENT & PLAN NOTE
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  At this time, no acute neurological symptoms.  Dexamethasone and antiseizure medication can be started if deemed to be necessary.  Discussed with neurosurgery-May use Decadron, avoid antiepileptic at this time.  Also recommending to consult with radiation oncology (which is not available in this campus-we will try to reach out to patient on oncology, Dr. Gonzalez in a.m.)

## 2024-10-08 NOTE — CONSULTS
[facility] Electronic Health Record Consult  IR has been consulted to evaluate the patient, determine the appropriate procedure, and whether or not a procedure can and should be performed regarding the care of Joce Tolbert.    We were consulted by hospitalist service concerning Joce Tolbert, and to possibly perform a port removal if medically appropriate for the patient. The patient is aware that a specialty consultation is taking place, and agrees to the IR Consult on their behalf.     Assessment/Plan:   57 yo female with ovarian carcinoma, with port placement at an outside institution on 9/16/2024.  Presenting with sepsis and gram positive bacteremia.  Port removal is desired.  This will be arranged by the IR team, but anticipate tomorrow.  If patient is unstable and removal is desired more urgently, transfer to [place] or [place] would be recommended.  NPO after midnight.    I spent 15 minutes in medical consultative time evaluating the medical record and imaging of Joce Tolbert.    Thank you for allowing [facility] to participate in the care of Joce Tolbert. Please don't hesitate to call or TigerText us with any questions.     Bill Mckinnon, DO

## 2024-10-08 NOTE — PLAN OF CARE
Problem: PAIN - ADULT  Goal: Verbalizes/displays adequate comfort level or baseline comfort level  Description: Interventions:  - Encourage patient to monitor pain and request assistance  - Assess pain using appropriate pain scale  - Administer analgesics based on type and severity of pain and evaluate response  - Implement non-pharmacological measures as appropriate and evaluate response  - Consider cultural and social influences on pain and pain management  - Notify physician/advanced practitioner if interventions unsuccessful or patient reports new pain  Outcome: Progressing     Problem: INFECTION - ADULT  Goal: Absence or prevention of progression during hospitalization  Description: INTERVENTIONS:  - Assess and monitor for signs and symptoms of infection  - Monitor lab/diagnostic results  - Monitor all insertion sites, i.e. indwelling lines, tubes, and drains  - Monitor endotracheal if appropriate and nasal secretions for changes in amount and color  - [place] appropriate cooling/warming therapies per order  - Administer medications as ordered  - Instruct and encourage patient and family to use good hand hygiene technique  - Identify and instruct in appropriate isolation precautions for identified infection/condition  Outcome: Progressing     Problem: SAFETY ADULT  Goal: Patient will remain free of falls  Description: INTERVENTIONS:  - Educate patient/family on patient safety including physical limitations  - Instruct patient to call for assistance with activity   - Consult OT/PT to assist with strengthening/mobility   - Keep Call bell within reach  - Keep bed low and locked with side rails adjusted as appropriate  - Keep care items and personal belongings within reach  - Initiate and maintain comfort rounds  - Make Fall Risk Sign visible to staff    - Apply yellow socks and bracelet for high fall risk patients  - Consider moving patient to room near nurses station  Outcome: Progressing  Goal: Maintain or  return to baseline ADL function  Description: INTERVENTIONS:  -  Assess patient's ability to carry out ADLs; assess patient's baseline for ADL function and identify physical deficits which impact ability to perform ADLs (bathing, care of mouth/teeth, toileting, grooming, dressing, etc.)  - Assess/evaluate cause of self-care deficits   - Assess range of motion  - Assess patient's mobility; develop plan if impaired  - Assess patient's need for assistive devices and provide as appropriate  - Encourage maximum independence but intervene and supervise when necessary  - Involve family in performance of ADLs  - Assess for home care needs following discharge   - Consider OT consult to assist with ADL evaluation and planning for discharge  - Provide patient education as appropriate  Outcome: Progressing  Goal: Maintains/Returns to pre admission functional level  Description: INTERVENTIONS:  - Perform AM-PAC 6 Click Basic Mobility/ Daily Activity assessment daily.  - Set and communicate daily mobility goal to care team and patient/family/caregiver.   - Collaborate with rehabilitation services on mobility goals if consulted    - Out of bed for toileting  - Record patient progress and toleration of activity level   Outcome: Progressing     Problem: DISCHARGE PLANNING  Goal: Discharge to home or other facility with appropriate resources  Description: INTERVENTIONS:  - Identify barriers to discharge w/patient and caregiver  - Arrange for needed discharge resources and transportation as appropriate  - Identify discharge learning needs (meds, wound care, etc.)  - Arrange for interpretive services to assist at discharge as needed  - Refer to Case Management Department for coordinating discharge planning if the patient needs post-hospital services based on physician/advanced practitioner order or complex needs related to functional status, cognitive ability, or social support system  Outcome: Progressing     Problem: Knowledge  Deficit  Goal: Patient/family/caregiver demonstrates understanding of disease process, treatment plan, medications, and discharge instructions  Description: Complete learning assessment and assess knowledge base.  Interventions:  - Provide teaching at level of understanding  - Provide teaching via preferred learning methods  Outcome: Progressing

## 2024-10-08 NOTE — ASSESSMENT & PLAN NOTE
The patient presented with extreme fatigue, weakness, was found to have elevated WBCs, and her Chemo-Port on left upper chest is tender, red, with some swelling.  Patient initially did vancomycin and cefepime, which transition to cefazolin per ID recommendation  Continue with maintenance IV fluid.  Blood culture showing positive.  Recommendation,-venous duplex of upper extremity left side negative.  CT chest abdomen reviewed.  TTE negative for vegetation  Per ID recommendation, IR consult placed for possible removal of port with culture  Patient also having diarrhea, check for C. difficile, stool enteric panel

## 2024-10-08 NOTE — CASE MANAGEMENT
Case Management Assessment & Discharge Planning Note    Patient name Joce Tolbert  Location /-01 MRN [id number]  : 1965 Date 10/8/2024       Current Admission Date: 10/7/2024  Current Admission Diagnosis:Sepsis (HCC)   Patient Active Problem List    Diagnosis Date Noted Date Diagnosed    Morbid obesity (HCC) 10/08/2024     Transaminitis 10/08/2024     Metastasis to brain (HCC) 10/07/2024     Neoplastic malignant related fatigue 10/07/2024     Bacteremia due to methicillin susceptible Staphylococcus aureus (MSSA) 12/15/2021     Acute respiratory failure with hypoxia (HCC) 2021     Acute kidney injury (HCC) 2021     Hyponatremia 2021     Sepsis (HCC) 2021     Diabetes mellitus (HCC)      Hypertension      Hypercholesterolemia      Ovarian cancer (HCC)      Hypothyroidism        LOS (days): 1  Geometric Mean LOS (GMLOS) (days): 4.9  Days to GMLOS:4.1     OBJECTIVE:    Risk of Unplanned Readmission Score: 15.07         Current admission status: Inpatient  Referral Reason: Other (Discharge Planning)    Preferred Pharmacy:   [id number] - [address] [address]  Phone: [phone] Fax: [phone]    Primary Care Provider: Ninfa Baltazar MD    [facility] [employer] [facility]  Secondary Insurance:     ASSESSMENT:  Active Health Care Proxies    There are no active Health Care Proxies on file.       Advance Directives  Does patient have a Health Care POA?: No  Was patient offered paperwork?: Yes (Provided paperwork to Pt)  Does patient currently have a Health Care decision maker?: Yes, please see Health Care Proxy section  Does patient have Advance Directives?: No  Was patient offered paperwork?: Yes (Provided paperwork to Pt)  Primary Contact: Ijeoma Sanchez (Daughter)  [phone] (Mobile)    Readmission Root Cause  30 Day Readmission: No    Patient Information  Admitted from:: Home  Mental Status:  Alert  During Assessment patient was accompanied by: Not accompanied during assessment  Assessment information provided by:: Patient  Primary Caregiver: Self  Support Systems: Daughter, Friend, Family members  County of Residence: [place]  What city do you live in?: [place]  Home entry access options. Select all that apply.: Stairs  Number of steps to enter home.: 3  Do the steps have railings?: Yes  Type of Current Residence: 3 story home  Upon entering residence, is there a bedroom on the main floor (no further steps)?: No  A bedroom is located on the following floor levels of residence (select all that apply):: 2nd Floor  Upon entering residence, is there a bathroom on the main floor (no further steps)?: No  Indicate which floors of current residence have a bathroom (select all the apply):: 2nd Floor  Number of steps to 2nd floor from main floor: One Flight  Living Arrangements: Lives w/ Daughter  Is patient a ?: No    Activities of Daily Living Prior to Admission  Functional Status: Independent  Completes ADLs independently?: Yes  Ambulates independently?: Yes  Does patient use assisted devices?: Yes  Assisted Devices (DME) used: Shower Chair, Straight Cane  Does patient currently own DME?: Yes  What DME does the patient currently own?: Shower Chair, Straight Cane  Does patient have a history of Outpatient Therapy (PT/OT)?: No  Does the patient have a history of Short-Term Rehab?: No  Does patient have a history of HHC?: No  Does patient currently have HHC?: No    Patient Information Continued  Income Source: Unemployed  Does patient have prescription coverage?: Yes  Does patient receive dialysis treatments?: No  Does patient have a history of substance abuse?: No  Does patient have a history of Mental Health Diagnosis?: No    Means of Transportation  Means of Transport to Appts:: Friends      Social Determinants of Health (SDOH)      Flowsheet Row Most Recent Value   Housing Stability    In the  last 12 months, was there a time when you were not able to pay the mortgage or rent on time? N   In the past 12 months, how many times have you moved where you were living? 0   At any time in the past 12 months, were you homeless or living in a shelter (including now)? N   Transportation Needs    In the past 12 months, has lack of transportation kept you from medical appointments or from getting medications? no   In the past 12 months, has lack of transportation kept you from meetings, work, or from getting things needed for daily living? No   Food Insecurity    Within the past 12 months, you worried that your food would run out before you got the money to buy more. Never true   Within the past 12 months, the food you bought just didn't last and you didn't have money to get more. Never true   Utilities    In the past 12 months has the electric, gas, oil, or water company threatened to shut off services in your home? No            DISCHARGE DETAILS:    Discharge planning discussed with:: Patient  Freedom of Choice: Yes  Comments - Freedom of Choice: Pt is interested in additional support through waiver aids. PT/OT evals pending  CM contacted family/caregiver?: No- see comments (Pt alert and engaged in assessment)  Were Treatment Team discharge recommendations reviewed with patient/caregiver?: Yes  Did patient/caregiver verbalize understanding of patient care needs?: Yes  Were patient/caregiver advised of the risks associated with not following Treatment Team discharge recommendations?: Yes    Contacts  Patient Contacts: Ijeoma Sanchez (Daughter)  Relationship to Patient:: Family  Contact Method: Phone  Phone Number: [phone] (Mobile)  Reason/Outcome: Emergency Contact    Requested Home Health Care         Is the patient interested in HHC at discharge?: No    DME Referral Provided  Referral made for DME?: No    Other Referral/Resources/Interventions Provided:  Interventions: Advanced Directives  Referral Comments:  Pt requested help with receiving addtional help through aids. Discussed [facility] with Pt and will make referral if Pt agreeable  Government Services:: [facility]    Treatment Team Recommendation: Home  Discharge Destination Plan:: Home  Transport at Discharge : Other (Comment) (TBD)      CM  met with patient to introduce self, explain role, complete CM assessment, and discuss DC planning.     Pt lives at home with daughter (who also has some medical health concerns). Pt and daughter do not drive but Pt reports they have a lot of support from family and neighbors. Her sister and nephew live in her neighbor.     Pt interested in additional support at home following her chemo treatments and states that she needs help a week or so following them. CM discussed referral to [facility] to see what additional resources are available for her in the home.     CM will discuss with Pt regarding [facility] referral and see if Pt is agreeable. CM will provide Pt with list of private caregiver agencies.     CM will continue to follow for discharge planning needs.

## 2024-10-08 NOTE — ASSESSMENT & PLAN NOTE
Both sets of blood cx from admission with MSSA. Suspect catheter associated bacteremia given local signs of infection (redness, swelling) along port site. Rule out endovascular infection. No other indwelling vascular or prosthetic devices. No open wounds, no GI,  symptoms. CXR is without pneumonia but patient has mild cough and dyspnea , rule out septic pulmonary emboli. Has a newly detected R frontal lobe enhancing mass but on MRI appears more consistent with mets rather than septic cerebral emboli.      Discontinue vanc, cefepime and start cefazolin 2 q6h   Recommend IR consult for immediate port removal   Recommend LUE venous duplex   Repeat blood cx in AM   Check TTE, favor KATINA    Check CT chest/abd    Final duration of antibiotics to be determined, anticipate 4-6 weeks of therapy

## 2024-10-08 NOTE — H&P
H&P - Hospitalist   Name: Joce Tolbert 58 y.o. female I MRN: [id number]  Unit/Bed#: ED 05 I Date of Admission: 10/7/2024   Date of Service: 10/7/2024 I Hospital Day: 0     Assessment & Plan  Possible sepsis without acute organ dysfunction (HCC)  The patient presented with extreme fatigue, weakness, was found to have elevated WBCs, and her Chemo-Port on left upper chest is tender, red, with some swelling.  I cannot exclude CLABSI.  She was started appropriately on vancomycin and cefepime, I will continue with the cefepime and pharmacy consult placed for vancomycin doses.  Continue with maintenance IV fluid.  Continue to follow-up blood culture that was collected 10/7.  IR consult can be placed for Chemo-Port removal and possible line holiday and then reconsidering placing another central line for chemotherapy.  Ovarian cancer (HCC)  The patient will continue with outpatient scheduled chemotherapy with her oncologist.  Metastasis to brain (HCC)  The patient was aware that she has a small mass with vasogenic edema on the right frontal lobe.  At this time, no acute neurological symptoms.  I will hold on dexamethasone at this time given possible line infection.  Dexamethasone and antiseizure medication can be started if deemed to be necessary.  Neurosurgery can be consulted and or oncology if deemed necessary at this time.    Neoplastic malignant related fatigue  Continue the fluid resuscitation and to treat underlying cause of fatigue which is most likely cancer and ongoing chemotherapy as well as possible sepsis.      VTE Pharmacologic Prophylaxis:   Moderate Risk (Score 3-4) - Pharmacological DVT Prophylaxis Contraindicated. Sequential Compression Devices Ordered.  Code Status: Prior full code  Discussion with family:     Anticipated Length of Stay: Patient will be admitted on an inpatient basis with an anticipated length of stay of greater than 2 midnights secondary to possible underlying sepsis, require IV  antibiotics and follow-up of blood culture..    History of Present Illness   Chief Complaint: Extreme fatigue    Joce Tolbert is a 58 y.o. female with a PMH of newly diagnosed ovarian cancer, on chemotherapy with [facility] [place], who presented with few days history of extreme fatigue that is progressively getting worse, the patient is unable to stand up from lying down or sitting position without feeling extreme fatigue and tiredness, she also complains of ongoing fever, chills, she is complaining of pain and tenderness over the insertion site of her Chemo-Port on the upper left chest.  The patient denied any cough, shortness of breath, abdominal pain, diarrhea, constipation, urinary symptoms.  In the ED, she was found to have a right frontal lobe mass with vasogenic edema suspicious for metastasis, the patient was made aware of that.  She was also found to be having leukocytosis,, she was resuscitated with IV fluids and she was started on IV cefepime and vancomycin and septic workup was initiated.    Review of Systems    unremarkable unless otherwise mentioned in my note.    Historical Information   Past Medical History:   Diagnosis Date    Diabetes mellitus (HCC)     Hypercholesterolemia     Hypertension      History reviewed. No pertinent surgical history.  Social History     Tobacco Use    Smoking status: Never    Smokeless tobacco: Never   Substance and Sexual Activity    Alcohol use: Not Currently    Drug use: Never    Sexual activity: Not on file     E-Cigarette/Vaping     E-Cigarette/Vaping Substances       Social History:  Marital Status: Single   Occupation:   Patient Pre-hospital Living Situation:   Patient Pre-hospital Level of Mobility:   Patient Pre-hospital Diet Restrictions:     Objective :  Temp:  [99.8 °F (37.7 °C)] 99.8 °F (37.7 °C)  HR:  [] 83  BP: (131-168)/(67-85) 131/67  Resp:  [17-33] 31  SpO2:  [90 %-97 %] 91 %  O2 Device: Nasal cannula  Nasal Cannula O2 Flow Rate (L/min):   [2 L/min] 2 L/min    Physical Exam    General: Obese patient looks sick.  CVS: S1, S2, RRR.  Lungs: Bilateral clear air entry  Abdomen: Obese, soft, nontender.  Neurological: Awake alert oriented to self, place, time, cranial nerves grossly intact, no gross focal weakness.  Skin: tenderness, redness, over Chemo-Port insertion site in the left upper chest.    Lines/Drains:  Lines/Drains/Airways       Active Status       Name Placement date Placement time Site Days    Implantable Apheresis Port Left Chest 09/16/24  --  --  21                    Central Line:  Goal for removal: Will discontinue when hemodynamically stable.             Lab Results: I have reviewed the following results:  Results from last 7 days   Lab Units 10/07/24  0930   WBC Thousand/uL 13.77*   HEMOGLOBIN g/dL 11.5   HEMATOCRIT % 34.2*   PLATELETS Thousands/uL 143*   SEGS PCT % 84*   LYMPHO PCT % 8*   MONO PCT % 6   EOS PCT % 0     Results from last 7 days   Lab Units 10/07/24  0930   SODIUM mmol/L 136   POTASSIUM mmol/L 3.7   CHLORIDE mmol/L 99   CO2 mmol/L 24   BUN mg/dL 29*   CREATININE mg/dL 1.93*   ANION GAP mmol/L 13   CALCIUM mg/dL 9.3   ALBUMIN g/dL 3.7   TOTAL BILIRUBIN mg/dL 0.92   ALK PHOS U/L 185*   ALT U/L 74*   AST U/L 52*   GLUCOSE RANDOM mg/dL 152*             Lab Results   Component Value Date    HGBA1C 6.3 (H) 03/28/2024    HGBA1C 6.5 (H) 08/10/2023    HGBA1C 6.8 (H) 02/08/2023     Results from last 7 days   Lab Units 10/07/24  0930   LACTIC ACID mmol/L 1.1   PROCALCITONIN ng/ml 1.26*       Imaging Results Review: No pertinent imaging studies reviewed.  Other Study Results Review: EKG was reviewed.     Administrative Statements       ** Please Note: This note has been constructed using a voice recognition system. **

## 2024-10-08 NOTE — ASSESSMENT & PLAN NOTE
Continue the fluid resuscitation and to treat underlying cause of fatigue which is most likely cancer and ongoing chemotherapy as well as possible sepsis.

## 2024-10-08 NOTE — TELEMEDICINE
"e-Consult (IPC)     Consults     Contacted by Dr. Bajwa at [facility].    Joce Tolbert 58 y.o. female MRN: [id number]  Unit/Bed#: -01 Encounter: [id number]    Reason for Consult    Per provider report, patient presents with history of ovarian cancer, presented with extreme fatigue, weakness, concern for sepsis secondary to port infection.  Imaging completed of the brain that notes brain lesion for which neurosurgery is consulted. Available past medical history,social history, surgical history, medication list, drug allergies and review of systems were reviewed.    /72   Pulse 99   Temp 97.5 °F (36.4 °C)   Resp 19   Ht 5' 5\" (1.651 m)   Wt 134 kg (295 lb)   SpO2 93%   BMI 49.09 kg/m²      Clinical exam per provider report, no neurodeficits.    Imaging personally reviewed.   MRI brain with and without contrast 10/7/2024: 1.3 intra-axial mass in the anterior right frontal lobe with mild vasogenic edema that likely represents metastasis from known ovarian cancer.    Assessment and Recommendations    Imaging reviewed, less than 3 cm lesion with mild vasogenic edema.  Patient appears to be asymptomatic from this.  Given size of lesion, would recommend radiation oncology consultation for consideration of radiation treatment rather than surgical intervention, no transfer to B recommended.  Can start Decadron 4 mg every 6 hours, would defer tapering of this to radiation oncology  Would not recommend seizure prophylaxis as patient did not present with seizure activity  Repeat CT head stat if GCS declines more than 2 points in 1 hour  Rest of care per primary team  Neurosurgery to sign off, no outpatient follow-up, please reach out with questions or concerns    All questions answered. Provider is in agreement with the course of action. 11-20 minutes, >50% of the total time devoted to medical consultative verbal/EMR discussion between providers. Written report will be generated in the EMR.    "

## 2024-10-08 NOTE — PLAN OF CARE
Problem: PHYSICAL THERAPY ADULT  Goal: Performs mobility at highest level of function for planned discharge setting.  See evaluation for individualized goals.  Description: Treatment/Interventions: Functional transfer training, LE strengthening/ROM, Elevations, Therapeutic exercise, Endurance training, Patient/family training, Equipment eval/education, Gait training, Compensatory technique education, Spoke to nursing, Spoke to case management          See flowsheet documentation for full assessment, interventions and recommendations.  Note: Prognosis: Good  Problem List: Decreased strength, Decreased endurance, Impaired balance, Decreased mobility, Obesity  Assessment: Pt is a 58 y.o. female seen for PT evaluation s/p admission to [facility] on 10/7/2024 with Sepsis (HCC).  Order placed for PT services.  Upon evaluation: Pt is presenting with impaired functional mobility due to decreased strength, decreased endurance, impaired balance, gait deviations, and fall risk requiring  SPV assistance for transfers and ambulation with no AD . Pt's clinical presentation is currently evolving given the functional mobility deficits above, especially decreased endurance and decreased activity tolerance, coupled with fall risks as indicated by AM-PAC 6-Clicks: 20/24 as well as hx of falls, polypharmacy, and obesity and combined with medical complications of abnormal renal lab values, abnormal blood sugars, abnormal CO2 values, and need for input for mobility technique/safety.  Pt's PMHx and comorbidities that may affect physical performance and progress include: DM, HTN, obesity, and cancer history and/or treatment. Personal factors affecting pt at time of IE include: step(s) to enter environment, multi-level environment, and recent fall(s)/fall history. Pt will benefit from continued skilled PT services to address deficits as defined above and to maximize level of functional mobility to facilitate return  toward PLOF and improved QOL. From PT/mobility standpoint, recommendation at time of d/c would be Level III (Minimum Resource Intensity) pending progress in order to reduce fall risk and maximize pt's functional independence and consistency with mobility in order to facilitate return to PLOF.  Recommend ther ex next 1-2 sessions.  Barriers to Discharge: None     Rehab Resource Intensity Level, PT: III (Minimum Resource Intensity)    See flowsheet documentation for full assessment.

## 2024-10-08 NOTE — ASSESSMENT & PLAN NOTE
Acute mild elevation in ALT, AST and ALP. Possibly secondary to sepsis and/or chemotherapy related. Also has underlying hepatic steatosis. Stable     Trend LFTs

## 2024-10-08 NOTE — CONSULTS
Vancomycin IV Pharmacy-to-Dose Consultation     Vancomycin has been discontinued.  Pharmacy will sign off.  Please contact or re-consult with questions.    Shanthi Mao, Pharmacist

## 2024-10-08 NOTE — ASSESSMENT & PLAN NOTE
Acute mild elevation in ALT, AST and ALP. Possibly secondary to sepsis and/or chemotherapy related.     Trend LFTs

## 2024-10-08 NOTE — NURSING NOTE
Dr Bajwa aware that patient starting to habve loose stools. She said when she has to go, she must get there quickly.

## 2024-10-08 NOTE — PROGRESS NOTES
Joce Tolbert is a 58 y.o. female who is currently ordered Vancomycin IV with management by the Pharmacy Consult service.  Relevant clinical data and objective / subjective history reviewed.  Vancomycin Assessment:  Indication and Goal AUC/Trough: Bacteremia (goal -600, trough >10)  Clinical Status:  new start   Micro:   pending  Renal Function:  SCr: 1.93 mg/dL  CrCl: 42.8 mL/min  Renal replacement: Not on dialysis  Days of Therapy: 1  Current Dose: 2000mg loading dose in ER  Vancomycin Plan:  New Dosinmg IV Q24H  Estimated AUC: 428 mcg*hr/mL  Estimated Trough: 13.8 mcg/mL  Next Level: 10/9/24 @ 0600  Renal Function Monitoring: Daily BMP and UOP  Pharmacy will continue to follow closely for s/sx of nephrotoxicity, infusion reactions and appropriateness of therapy.  BMP and CBC will be ordered per protocol. We will continue to follow the patient’s culture results and clinical progress daily.    Marcia Potter, Pharmacist

## 2024-10-08 NOTE — UTILIZATION REVIEW
Initial Clinical Review    Admission: Date/Time/Statement:   Admission Orders (From admission, onward)       Ordered        10/07/24 2052  INPATIENT ADMISSION  Once                          Orders Placed This Encounter   Procedures    INPATIENT ADMISSION     Standing Status:   Standing     Number of Occurrences:   1     Order Specific Question:   Level of Care     Answer:   Med Surg [16]     Order Specific Question:   Estimated length of stay     Answer:   More than 2 Midnights     Order Specific Question:   Certification     Answer:   I certify that inpatient services are medically necessary for this patient for a duration of greater than two midnights. See H&P and MD Progress Notes for additional information about the patient's course of treatment.     ED Arrival Information       Expected   10/7/2024 08:34    Arrival   10/7/2024 08:44    Acuity   Urgent              Means of arrival   Ambulance    Escorted by   [facility]    Service   Hospitalist    Admission type   Emergency              Arrival complaint   Weakness/Multiple Falls             Chief Complaint   Patient presents with    Weakness - Generalized     Pt had chemo treatment on 9/24, states that since then they have been having worsening weakness- weakness causing patient to have multiple falls at home, +HS/-LOC/-BT, pt reports head and back pain- states two days ago they started with cough and SOB        Initial Presentation: 58 y.o. female with PMHx of newly dx'd ovarian cancer on chemotherapy, to ED by ems with c/o progressively worsening extreme fatigue and weakness for past few days. Pt now having difficulty getting up from lying down position. She also c/o fever, chills and pain/tenderness over her LCW PAC insertion site. On presentation T 99.8, tachypneic. WBC 13.77, Hct 34.2, Plts 143. BUN 29, Cr 1.93, Alk phos 185, ALT 74, AST 52, Glucose 152. Procal 1.26. CTH showed pt to have a right frontal lobe mass with vasogenic edema suspicious  for metastasis.  IV dilaudid and cefepime, vanco and IV decadron given in ED.   Admitted Inpatient to [facility] Unit with Sepsis, newly dx'd Brain Mets -- continue cefepime and vancomycin. Pharmacy consulted for vanc dose. IVFs. F/u blood cxs. IR consult re PAC site. ID consulted. Avoid nephrotoxins, renally dose abx.      Anticipated Length of Stay/Certification Statement: Patient will be admitted on an inpatient basis with an anticipated length of stay of greater than 2 midnights secondary to possible underlying sepsis, require IV antibiotics and follow-up of blood culture.    Date: 10/8   Day 2: continue supportive care, IV/po pain meds. Reg diet. I/Os. SCDs, OOB with assist. IV abx per ID.    ID consult - A: sepsis likely 2/2 gram positive bacteremia and probable port infection, MSSA (both sets of blood cx from admission with MSSA). Plan: d/c vanc, cefepime and start cefazolin 2 q6h. Rcommend IR consult for immediate port removal. Check  LUE venous duplex. Repeat blood cx in AM. Check TTE, favor KATINA. Check CT chest/abd. Final duration of abx TBD, anticipate 4-6 weeks of therapy.     IR consult -- Port removal is desired. This will be arranged by the IR team, anticipate tomorrow. If pt is unstable and removal is desired more urgently, transfer to [place] or [place] would be recommended. NPO after midnight.       ED Treatment-Medication Administration from 10/07/2024 0844 to 10/07/2024 2150         Date/Time Order Dose Route Action     10/07/2024 1034 sodium chloride 0.9 % bolus 1,000 mL 1,000 mL Intravenous New Bag     10/07/2024 1034 cefepime (MAXIPIME) IVPB (premix in dextrose) 2,000 mg 50 mL 2,000 mg Intravenous New Bag     10/07/2024 1106 vancomycin (VANCOCIN) 2,000 mg in sodium chloride 0.9 % 500 mL IVPB 2,000 mg Intravenous New Bag     10/07/2024 1101 HYDROmorphone (DILAUDID) injection 0.5 mg 0.5 mg Intravenous Given     10/07/2024 1806 HYDROmorphone (DILAUDID) injection 0.5 mg 0.5 mg Intravenous Given      10/07/2024 2006 HYDROmorphone (DILAUDID) injection 0.5 mg 0.5 mg Intravenous Given     10/07/2024 1628 sodium chloride 0.9 % bolus 500 mL 500 mL Intravenous New Bag     10/07/2024 1628 sodium chloride 0.9 % infusion 250 mL/hr Intravenous New Bag     10/07/2024 1806 dexamethasone (PF) (DECADRON) injection 10 mg 10 mg Intravenous Given       Scheduled Medications:  cefazolin, 2,000 mg, Intravenous, Q6H    lactated ringers infusion  Rate: 125 mL/hr Dose: 125 mL/hr  Freq: Continuous Route: IV  Indications of Use: IV Hydration  Last Dose: Stopped (10/08/24 0900)  Start: 10/07/24 2215 End: 10/08/24 0911    PRN Meds:  acetaminophen, 975 mg, Oral, Q6H PRN  HYDROmorphone, 0.5 mg, Intravenous, Q2H PRN 10/7 x3  ondansetron, 4 mg, Intravenous, Q6H PRN      ED Triage Vitals   Temperature Pulse Respirations Blood Pressure SpO2 Pain Score   10/07/24 0850 10/07/24 0850 10/07/24 0850 10/07/24 0850 10/07/24 0850 10/07/24 1101   99.8 °F (37.7 °C) 94 18 141/71 90 % 6     Weight (last 2 days)       Date/Time Weight    10/07/24 2202 134 (295.42)    10/07/24 0850 133 (293.21)            Vital Signs (last 3 days)       Date/Time Temp Pulse Resp BP MAP (mmHg) SpO2 Calculated FIO2 (%) - Nasal Cannula Nasal Cannula O2 Flow Rate (L/min) O2 Device Patient Position - Orthostatic VS Xi Coma Scale Score Pain    10/08/24 0904 -- -- -- -- -- -- -- -- -- -- -- No Pain    10/08/24 0751 97.5 °F (36.4 °C) -- 18 116/69 85 -- -- -- -- Lying -- --    10/07/24 2222 -- -- -- -- -- 92 % 28 2 L/min Nasal cannula -- 15 No Pain    10/07/24 2202 96.8 °F (36 °C) 82 22 118/59 79 91 % 28 2 L/min Nasal cannula Lying -- --    10/07/24 2100 -- 83 31 131/67 91 91 % 28 2 L/min Nasal cannula -- -- --    10/07/24 2006 -- -- -- -- -- -- -- -- -- -- -- 5    10/07/24 2000 -- 94 20 146/70 101 90 % -- -- -- -- -- --    10/07/24 1835 -- -- -- -- -- -- -- -- -- -- -- 4    10/07/24 1815 -- 97 28 139/74 100 94 % -- -- -- -- -- --    10/07/24 1806 -- -- -- -- -- -- -- -- -- --  -- 8    10/07/24 1630 -- 100 22 168/84 107 94 % -- -- -- -- -- --    10/07/24 1500 -- 95 22 159/76 109 93 % 28 2 L/min Nasal cannula -- -- --    10/07/24 1330 -- 92 28 165/68 98 94 % -- -- -- -- -- --    10/07/24 1238 -- -- -- -- -- 97 % 28 2 L/min Nasal cannula -- -- --    10/07/24 1132 -- 89 24 161/85 116 94 % -- -- -- -- -- --    10/07/24 1131 -- -- -- -- -- -- -- -- -- -- -- 4    10/07/24 1101 -- -- -- -- -- -- -- -- -- -- -- 6    10/07/24 1045 -- 89 33 142/83 108 94 % -- -- -- -- -- --    10/07/24 1035 -- 90 -- 144/82 106 94 % -- -- -- -- -- --    10/07/24 1000 -- 95 25 145/80 106 96 % -- -- -- -- -- --    10/07/24 0930 -- 90 27 131/77 99 95 % -- -- -- -- -- --    10/07/24 0915 -- 91 24 134/75 98 93 % -- -- -- -- -- --    10/07/24 0901 -- -- -- -- -- -- -- -- None (Room air) -- -- --    10/07/24 0900 -- 90 17 138/72 99 92 % -- -- -- -- 15 --    10/07/24 0850 99.8 °F (37.7 °C) 94 18 141/71 -- 90 % -- -- -- Sitting -- --              Pertinent Labs/Diagnostic Test Results:   Radiology:  CT chest abdomen pelvis wo contrast   Final Interpretation by Ronald Marti MD (10/08 1017)      1.  No convincing acute source of infection. There are nonspecific faint patchy scattered residual nodular opacity throughout both lungs, which may represent nonspecific infectious/inflammatory etiologies and/or scarring. However, these have dramatically    improved from prior studies in 2021.   2.  Small residual pulmonary nodules, not clearly changed from 2021.   3.  Ill-defined encasing retroperitoneal lesion, concerning for metastatic lymphadenopathy or lymphoma. Clinical correlation is necessary.   4.  Likely mild diffuse hepatic steatosis.      The study was marked in EPIC for significant notification.         Workstation performed: [id number]         MRI brain w wo contrast   Final Interpretation by E. Alec Schoenberger, MD (10/07 1739)      1.3 cm intra-axial mass in the anterior right frontal lobe with mild surrounding  vasogenic edema that likely represents metastasis from known ovarian cancer.   No other mass or abnormal enhancement.      The study was marked in EPIC for immediate notification.      Workstation performed: [id number]         CT head without contrast   Final Interpretation by Ike Julien DO (10/07 1100)      Within the anterior inferior right frontal lobe there is focal heterogeneity including decreased attenuation suggestive of vasogenic edema. MRI of the brain with contrast recommended. See above discussion regarding differential considerations.         I personally discussed this study with WILBER LYNN on 10/7/2024 11:00 AM.                        Resident: Magdaleno Scales I, the attending radiologist, have reviewed the images and agree with the final report above.      Workstation performed: [id number]         XR chest pa and lateral   Final Interpretation by Aubree Tomlin MD (10/07 1025)      Low lung volumes producing vascular crowding. No definite pneumonia.            Workstation performed: [id number]         VAS upper limb venous duplex scan, unilateral/limited    (Results Pending)   IR port Removal    (Results Pending)           Results from last 7 days   Lab Units 10/07/24  0930   WBC Thousand/uL 13.77*   HEMOGLOBIN g/dL 11.5   HEMATOCRIT % 34.2*   PLATELETS Thousands/uL 143*   TOTAL NEUT ABS Thousands/µL 11.68*     Results from last 7 days   Lab Units 10/08/24  0526 10/07/24  0930   SODIUM mmol/L 140 136   POTASSIUM mmol/L 4.0 3.7   CHLORIDE mmol/L 109* 99   CO2 mmol/L 20* 24   ANION GAP mmol/L 11 13   BUN mg/dL 24 29*   CREATININE mg/dL 1.35* 1.93*   EGFR ml/min/1.73sq m 42 27   CALCIUM mg/dL 8.4 9.3   MAGNESIUM mg/dL 2.0  --    PHOSPHORUS mg/dL 3.7  --      Results from last 7 days   Lab Units 10/08/24  0526 10/07/24  0930   AST U/L 63* 52*   ALT U/L 91* 74*   ALK PHOS U/L 178* 185*   TOTAL PROTEIN g/dL 6.5 7.6   ALBUMIN g/dL 3.3* 3.7   TOTAL BILIRUBIN mg/dL 0.70  0.92       Results from last 7 days   Lab Units 10/08/24  0526 10/07/24  0930   GLUCOSE RANDOM mg/dL 179* 152*         Results from last 7 days   Lab Units 10/07/24  0930   PROCALCITONIN ng/ml 1.26*     Results from last 7 days   Lab Units 10/07/24  0930   LACTIC ACID mmol/L 1.1           Results from last 7 days   Lab Units 10/07/24  0957   CLARITY UA  Slightly Cloudy   COLOR UA  Yellow   SPEC GRAV UA  <=1.005   PH UA  6.0   GLUCOSE UA mg/dl Negative   KETONES UA mg/dl Negative   BLOOD UA  Moderate*   PROTEIN UA mg/dl 30 (1+)*   NITRITE UA  Negative   BILIRUBIN UA  Negative   UROBILINOGEN UA E.U./dl 0.2   LEUKOCYTES UA  Negative   WBC UA /hpf 0-5   RBC UA /hpf 0-1   BACTERIA UA /hpf Moderate*   EPITHELIAL CELLS WET PREP /hpf Occasional         Results from last 7 days   Lab Units 10/07/24  0957 10/07/24  0930   GRAM STAIN RESULT  Gram positive cocci in clusters* Gram positive cocci in clusters*         Past Medical History:   Diagnosis Date    Diabetes mellitus (HCC)     Hypercholesterolemia     Hypertension      Present on Admission:   Ovarian cancer (HCC)   Neoplastic malignant related fatigue   Sepsis (HCC)   Bacteremia due to methicillin susceptible Staphylococcus aureus (MSSA)   Acute kidney injury (HCC)      Admitting Diagnosis: Bacteremia [R78.81]  Syncope [R55]  Weakness generalized [R53.1]  Brain mass [G93.89]  DIANA (acute kidney injury) (HCC) [N17.9]  Generalized weakness [R53.1]  Age/Sex: 58 y.o. female    Network Utilization Review Department  ATTENTION: Please call with any questions or concerns to [phone] and carefully listen to the prompts so that you are directed to the right person. All voicemails are confidential.   For Discharge needs, contact Care Management DC Support Team at [phone] opt. [phone]  Send all requests for admission clinical reviews, approved or denied determinations and any other requests to dedicated fax number below belonging to the campus where the patient is receiving  treatment. List of dedicated fax numbers for the Facilities:  FACILITY NAME UR FAX NUMBER   ADMISSION DENIALS (Administrative/Medical Necessity) [phone]   DISCHARGE SUPPORT TEAM (NETWORK) [phone]   PARENT CHILD HEALTH (Maternity/NICU/Pediatrics) [phone]   [facility] [phone]   [facility] [phone]   [facility] [phone]   [facility] [phone]   [facility] [phone]   [facility] [phone]   [facility] [phone]   [facility] [phone]   [facility] [phone]   [facility] [phone]   [facility] [phone]   [facility] [phone]

## 2024-10-08 NOTE — CONSULTS
Consultation - Infectious Disease   Name: Joce Tolbert 58 y.o. female I MRN: [id number]  Unit/Bed#: -01 I Date of Admission: 10/7/2024   Date of Service: 10/8/2024 I Hospital Day: 1   Inpatient consult to Infectious Diseases  Consult performed by: Erica Garcia MD  Consult ordered by: Sherri An MD          VIRTUAL CARE DOCUMENTATION:     1. This service was provided via Telemedicine using [employer]     2. Parties in the room with patient during teleconsult PCA    3. Confidentiality My office door was closed     4. Participants No one else was in the room    5. Patient acknowledged consent and understanding of privacy and security of the  Telemedicine consult. I informed the patient that I have reviewed their record in Epic and presented the opportunity for them to ask any questions regarding the visit today.  The patient agreed to participate.    6. Time spent 8 minutes       Physician Requesting Evaluation: Nemo Bajwa MD   Reason for Evaluation / Principal Problem: Sepsis          Assessment & Plan  Sepsis (HCC)  Leukocytosis, tachycardia, tachypnea. Cause of sepsis is gram positive bacteremia and probable port infection. Hemodynamically stable, afebrile     Continue supportive care   Check daily CBC, CMP while inpatient to monitor for any evolving antibiotic toxicity or treatment failure   Antibiotics and additional management as below  Bacteremia due to methicillin susceptible Staphylococcus aureus (MSSA)  Both sets of blood cx from admission with MSSA. Suspect catheter associated bacteremia given local signs of infection (redness, swelling) along port site. Rule out endovascular infection. No other indwelling vascular or prosthetic devices. No open wounds, no GI,  symptoms. CXR is without pneumonia but patient has mild cough and dyspnea , rule out septic pulmonary emboli. Has a newly detected R frontal lobe enhancing mass but on MRI appears more consistent with mets rather than  septic cerebral emboli.      Discontinue vanc, cefepime and start cefazolin 2 q6h   Recommend IR consult for immediate port removal   Recommend LUE venous duplex   Repeat blood cx in AM   Check TTE, favor KATINA    Check CT chest/abd    Final duration of antibiotics to be determined, anticipate 4-6 weeks of therapy     Ovarian cancer (HCC)  S/p chemo, LEIDA-BSO in 2021. Now with recurrent disease, retroperitoneal lymphadenopathy. Port placed 9/16, received first treatment of paclitaxel/carboplatin on 9/24. Concern for port site infection and brain mets as noted above     Recommend port removal    Additional mngt per oncology, consider neurosurgery evaluation  Acute kidney injury (HCC)  Likely pre-renal/septic. Creatinine is improving     Avoid nephrotoxins, renally dose antibiotics  Transaminitis  Acute mild elevation in ALT, AST and ALP. Possibly secondary to sepsis and/or chemotherapy related.     Trend LFTs  Morbid obesity (HCC)  A1C 6.3 in 3/2024. Risk factor for infection        Reviewed recommendations to continue iv abx and obtain additional testing  with primary team who is in agreement. Will follow.  Please call with concerns or any changes in clinical status or new test results.      HPI: Joce Tolbert is a 58 y.o. year old female with stage 2a ovaria cancer diagnosed in 3/2021, she is s/p neoadjuvant chemotherapy followed by LEIDA, BSO followed by recurrent disease in the form of retroperitoneal lymphadenopathy detected last month. She was restarted on paclitaxel, carboplatin, had a left chest port placed and had treatment on 9/24. Since then she has had worsening generalized weakness and multiple falls with headache and low back pain. She presented to the ER on 10/7 with few days of cough, shortness of breath and redness and pain along the catheter as well as fever and chills. CT head noted a new right frontal lobe mass with vasogenic edema suspicious for mets. She also had evidence of sepsis. She was admitted  on vanc, cefepime and blood cx are growing GPC in clusters.  Infectious disease is being consulted for diagnostic work up and antibiotic management.    Review of Systems  Pertinent positives and negatives as noted in HPI. Rest complete 12 point system-based review of systems is otherwise negative.    PAST MEDICAL HISTORY:  Past Medical History:   Diagnosis Date    Diabetes mellitus (HCC)     Hypercholesterolemia     Hypertension      History reviewed. No pertinent surgical history.    FAMILY HISTORY:  Non-contributory    SOCIAL HISTORY:  Social History   Single  Social History     Substance and Sexual Activity   Alcohol Use Not Currently     Social History     Substance and Sexual Activity   Drug Use Never     Social History     Tobacco Use   Smoking Status Never   Smokeless Tobacco Never       ALLERGIES:  Allergies   Allergen Reactions    Azithromycin Hives    Duloxetine Hcl GI Intolerance     Diarrhea    Loratadine Other (See Comments)     Patient states she was getting palpitations    Statins Myalgia       MEDICATIONS:  All current active medications have been reviewed.    Physical Exam     Temp:  [96.8 °F (36 °C)-99.8 °F (37.7 °C)] 97.5 °F (36.4 °C)  HR:  [] 82  Resp:  [17-33] 18  BP: (116-168)/(59-85) 116/69  SpO2:  [90 %-97 %] 92 %  Temp (24hrs), Av °F (36.7 °C), Min:96.8 °F (36 °C), Max:99.8 °F (37.7 °C)  Current: Temperature: 97.5 °F (36.4 °C)    Intake/Output Summary (Last 24 hours) at 10/8/2024 0811  Last data filed at 10/8/2024 0300  Gross per 24 hour   Intake 2050 ml   Output 1222 ml   Net 828 ml         Physical exam findings reported by bedside and primary medical team staff    General Appearance:  Appearing fatigued, nontoxic, and in no distress, appears stated age   Lungs:   Diminished to auscultation bilaterally, no audible wheezes, rhonchi and rales, respirations unlabored   Chest Wall:  Erythema and tenderness along left chest wall port    Heart:  Regular rate and rhythm, S1, S2 normal,  no murmur, rub or gallop   Abdomen:   Soft, non-tender, non-distended, positive bowel sounds, no masses, no organomegaly    No CVA tenderness   Extremities: Extremities normal, atraumatic, no cyanosis, clubbing or edema   Skin: Skin color, texture, turgor normal, no rashes or lesions. No draining wounds noted.   Neurologic: Alert and oriented times 3, mildly forgetful responses       Invasive Devices:   Implantable Apheresis Port Left Chest (Active)   De-accessed by: previous hospital chemo visit Nurse, per patient 10/07/24 0910   Site Assessment Red;Warm to touch;Other (Comment) 10/08/24 0200       Peripheral IV 10/07/24 Right Antecubital (Active)   Site Assessment WDL 10/08/24 0200   Dressing Type Transparent 10/08/24 0200   Line Status Flushed;Infusing 10/08/24 0200   Dressing Status Clean;Dry;Intact 10/08/24 0200   Dressing Change Due 10/11/24 10/08/24 0200   Reason Not Rotated Not due 10/08/24 0200       Labs, Imaging, & Other Studies     Lab Results:    I have personally reviewed pertinent labs.    Results from last 7 days   Lab Units 10/07/24  0930   WBC Thousand/uL 13.77*   HEMOGLOBIN g/dL 11.5   PLATELETS Thousands/uL 143*     Results from last 7 days   Lab Units 10/08/24  0526 10/07/24  0930   POTASSIUM mmol/L 4.0 3.7   CHLORIDE mmol/L 109* 99   CO2 mmol/L 20* 24   BUN mg/dL 24 29*   CREATININE mg/dL 1.35* 1.93*   EGFR ml/min/1.73sq m 42 27   CALCIUM mg/dL 8.4 9.3   AST U/L 63* 52*   ALT U/L 91* 74*   ALK PHOS U/L 178* 185*     Results from last 7 days   Lab Units 10/07/24  0957 10/07/24  0930   GRAM STAIN RESULT  Gram positive cocci in clusters* Gram positive cocci in clusters*       Imaging Studies:   I have personally reviewed pertinent imaging study reports and images in PACS.      EKG, Pathology, and Other Studies:   I have personally reviewed pertinent reports and reviewed external records.    Counseling/Coordination of care:       Total 90 minutes in evaluation of the patient and communication  with the patient via telehealth of which 50 minutes was in counseling/coordination of care.  Extensive review of the medical records in epic including review of the notes, radiographs, and laboratory results.  My recommendations were discussed with the patient in detail who verbalized understanding.

## 2024-10-08 NOTE — PHYSICAL THERAPY NOTE
PHYSICAL THERAPY EVALUATION      NAME:  Joce Tolbert  DATE: 10/08/24    AGE:   58 y.o.  Mrn:   [id number]  ADMIT DX:  Bacteremia [R78.81]  Syncope [R55]  Weakness generalized [R53.1]  Brain mass [G93.89]  DIANA (acute kidney injury) (HCC) [N17.9]  Generalized weakness [R53.1]    Past Medical History:   Diagnosis Date    Diabetes mellitus (HCC)     Hypercholesterolemia     Hypertension      Length Of Stay: 1  Performed at least 2 patient identifiers during session: Name and Birthday        PHYSICAL THERAPY EVALUATION:       10/08/24 0904   PT Last Visit   PT Visit Date 10/08/24   Note Type   Note type Evaluation   Pain Assessment   Pain Assessment Tool 0-10   Pain Score No Pain   Restrictions/Precautions   Weight Bearing Precautions Per Order No   Other Precautions O2;Fall Risk;Chair Alarm;Bed Alarm  (2L O2)   Home Living   Type of Home House   Home Layout Two level;Bed/bath upstairs  (3 DEB B rails; FF stairs to 2nd flr single rail)   Bathroom Shower/Tub Tub/shower unit   Bathroom Toilet Raised   Bathroom Equipment Grab bars in shower;Shower chair  (does not use shower chair)   Bathroom Accessibility Accessible   Home Equipment Cane  (does not use cane)   Prior Function   Level of [place] Independent with functional mobility   Lives With Daughter  (has Sickle Cell)   Receives Help From Family;Neighbor   IADLs Independent with meal prep;Independent with medication management;Family/Friend/Other provides transportation   Falls in the last 6 months   (2)   Comments IND no AD   General   Additional Pertinent History Ovarian CA with R frontal lobe mass   Family/Caregiver Present No   Cognition   Overall Cognitive Status WFL   Arousal/Participation Cooperative   Orientation Level Oriented X4   Memory Within functional limits   Following Commands Follows one step commands without difficulty   RLE Assessment   RLE Assessment WFL   LLE Assessment   LLE Assessment WFL   Transfers   Sit to Stand 5  Supervision   Stand  to Sit 5  Supervision   Stand pivot 5  Supervision   Additional items Verbal cues   Toilet transfer 5  Supervision   Additional items Standard toilet   Additional Comments No AD   Ambulation/Elevation   Gait pattern Improper Weight shift;Inconsistent deon;Excessively slow;Wide JAMEL   Gait Assistance 5  Supervision   Additional items Verbal cues   Assistive Device None   Distance 19 ft   Stair Management Assistance   (see tx aspect of note)   Balance   Static Sitting Normal   Dynamic Sitting Good   Static Standing Fair +   Dynamic Standing Fair   Ambulatory Fair   Endurance Deficit   Endurance Deficit Yes   Activity Tolerance   Activity Tolerance Patient limited by fatigue   Nurse Made Aware EDYTA Yang   Prognosis Good   Problem List Decreased strength;Decreased endurance;Impaired balance;Decreased mobility;Obesity   Barriers to Discharge None   Goals   Patient Goals go home   STG Expiration Date 10/22/24   PT Treatment Day 1   Plan   Treatment/Interventions Functional transfer training;LE strengthening/ROM;Elevations;Therapeutic exercise;Endurance training;Patient/family training;Equipment eval/education;Gait training;Compensatory technique education;Spoke to nursing;Spoke to case management   PT Frequency 2-3x/wk   Discharge Recommendation   Rehab Resource Intensity Level, PT III (Minimum Resource Intensity)   AM-PAC Basic Mobility Inpatient   Turning in Flat Bed Without Bedrails 4   Lying on Back to Sitting on Edge of Flat Bed Without Bedrails 4   Moving Bed to Chair 3   Standing Up From Chair Using Arms 3   Walk in Room 3   Climb 3-5 Stairs With Railing 3   Basic Mobility Inpatient Raw Score 20   Basic Mobility Standardized Score 43.99   [facility] Highest Level Of Mobility   -HLM Goal 6: Walk 10 steps or more   JH-HLM Achieved 7: Walk 25 feet or more   Additional Treatment Session   Start Time 1106   End Time 1114   Treatment Assessment Pt tolerates tx session well.  She reports no concerns of  D/C home once medically stable.  Pt is motivated to remain as independent as possible.  On room air for treatment session; no c/o feeling SOB, lightheaded, or dizzy throughout.   Equipment Use Pt performs supine to sit mod (I).  She performs sit to stand SPV no AD.  Pt ambulates 12 ft x 2 no AD SPV.  Pt completes toilet transfer SPV and hygiene mod (I).  Pt navigates 3 steps using railing SPV.   End of Consult   Patient Position at End of Consult Supine;Bed/Chair alarm activated;All needs within reach; SpO2 >90%     (Please find full objective findings from PT assessment regarding body systems outlined above).     Assessment: Pt is a 58 y.o. female seen for PT evaluation s/p admission to [facility] on 10/7/2024 with Sepsis (HCC).  Order placed for PT services.  Upon evaluation: Pt is presenting with impaired functional mobility due to decreased strength, decreased endurance, impaired balance, gait deviations, and fall risk requiring  SPV assistance for transfers and ambulation with no AD . Pt's clinical presentation is currently evolving given the functional mobility deficits above, especially decreased endurance and decreased activity tolerance, coupled with fall risks as indicated by AM-PAC 6-Clicks: 20/24 as well as hx of falls, polypharmacy, and obesity and combined with medical complications of abnormal renal lab values, abnormal blood sugars, abnormal CO2 values, and need for input for mobility technique/safety.  Pt's PMHx and comorbidities that may affect physical performance and progress include: DM, HTN, obesity, and cancer history and/or treatment. Personal factors affecting pt at time of IE include: step(s) to enter environment, multi-level environment, and recent fall(s)/fall history. Pt will benefit from continued skilled PT services to address deficits as defined above and to maximize level of functional mobility to facilitate return toward PLOF and improved QOL. From PT/mobility  standpoint, recommendation at time of d/c would be Level III (Minimum Resource Intensity) pending progress in order to reduce fall risk and maximize pt's functional independence and consistency with mobility in order to facilitate return to PLOF.  Recommend ther ex next 1-2 sessions.     The patient's AM-PAC Basic Mobility Inpatient Short Form Raw Score is 20. A Raw score of greater than 16 suggests the patient may benefit from discharge to home. Please also refer to the recommendation of the Physical Therapist for safe discharge planning.       Goals: Pt will perform transfers with modified I to increase Indep in home environment and prepare for ambulation. Pt will ambulate with no AD for >/= 100 ft with  modified I  to decrease risk for falls, improve activity tolerance, and improve gait quality and to access home environment. Pt will complete >/= 12 steps with with unilateral handrail with modified I to return to home with DEB and return to multilevel home. Pt will participate in objective balance assessment to determine baseline fall risk.        Carlton Ricci, PT,DPT

## 2024-10-08 NOTE — ASSESSMENT & PLAN NOTE
Leukocytosis, tachycardia, tachypnea. Cause of sepsis is gram positive bacteremia and probable port infection. Hemodynamically stable, afebrile     Continue supportive care   Check daily CBC, CMP while inpatient to monitor for any evolving antibiotic toxicity or treatment failure   Antibiotics and additional management as below

## 2024-10-08 NOTE — ASSESSMENT & PLAN NOTE
Both sets of blood cx from admission with MSSA.   Suspect catheter associated bacteremia given local signs of infection (redness, swelling) along Left port site. Rule out endovascular infection but TTE is without vegetation. No other indwelling vascular or prosthetic devices. Duplex without acute DVT, has chronic RIJ thrombus.   CT chest/abd without septic pulmonary emboli or other infectious focus.   Has a newly detected R frontal lobe enhancing mass but on MRI appears more consistent with mets rather than septic cerebral emboli.      Continue cefazolin 2 q6h   Await IR evaluation for port removal   FU repeat blood cx   Recommend KATINA if blood cx fail to clear    Final duration of antibiotics to be determined, anticipate 4-6 weeks of therapy

## 2024-10-08 NOTE — ASSESSMENT & PLAN NOTE
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  At this time, no acute neurological symptoms.  I will hold on dexamethasone at this time given possible line infection.  Dexamethasone and antiseizure medication can be started if deemed to be necessary.  Neurosurgery can be consulted and or oncology if deemed necessary at this time.

## 2024-10-08 NOTE — ASSESSMENT & PLAN NOTE
The patient presented with extreme fatigue, weakness, was found to have elevated WBCs, and her Chemo-Port on left upper chest is tender, red, with some swelling.  I cannot exclude CLABSI.  She was started appropriately on vancomycin and cefepime, I will continue with the cefepime and pharmacy consult placed for vancomycin doses.  Continue with maintenance IV fluid.  Continue to follow-up blood culture that was collected 10/7.  IR consult can be placed for Chemo-Port removal and possible line holiday and then reconsidering placing another central line for chemotherapy.

## 2024-10-08 NOTE — PLAN OF CARE
Problem: PAIN - ADULT  Goal: Verbalizes/displays adequate comfort level or baseline comfort level  Description: Interventions:  - Encourage patient to monitor pain and request assistance  - Assess pain using appropriate pain scale  - Administer analgesics based on type and severity of pain and evaluate response  - Implement non-pharmacological measures as appropriate and evaluate response  - Consider cultural and social influences on pain and pain management  - Notify physician/advanced practitioner if interventions unsuccessful or patient reports new pain  Outcome: Progressing     Problem: INFECTION - ADULT  Goal: Absence or prevention of progression during hospitalization  Description: INTERVENTIONS:  - Assess and monitor for signs and symptoms of infection  - Monitor lab/diagnostic results  - Monitor all insertion sites, i.e. indwelling lines, tubes, and drains  - Monitor endotracheal if appropriate and nasal secretions for changes in amount and color  - [place] appropriate cooling/warming therapies per order  - Administer medications as ordered  - Instruct and encourage patient and family to use good hand hygiene technique  - Identify and instruct in appropriate isolation precautions for identified infection/condition  Outcome: Progressing     Problem: SAFETY ADULT  Goal: Patient will remain free of falls  Description: INTERVENTIONS:  - Educate patient/family on patient safety including physical limitations  - Instruct patient to call for assistance with activity   - Consult OT/PT to assist with strengthening/mobility   - Keep Call bell within reach  - Keep bed low and locked with side rails adjusted as appropriate  - Keep care items and personal belongings within reach  - Initiate and maintain comfort rounds  - Make Fall Risk Sign visible to staff  - Offer Toileting every 2 Hours, in advance of need  - Initiate/Maintain bed alarm  - Obtain necessary fall risk management equipment walker   - Apply yellow  socks and bracelet for high fall risk patients  - Consider moving patient to room near nurses station  Outcome: Progressing  Goal: Maintain or return to baseline ADL function  Description: INTERVENTIONS:  -  Assess patient's ability to carry out ADLs; assess patient's baseline for ADL function and identify physical deficits which impact ability to perform ADLs (bathing, care of mouth/teeth, toileting, grooming, dressing, etc.)  - Assess/evaluate cause of self-care deficits   - Assess range of motion  - Assess patient's mobility; develop plan if impaired  - Assess patient's need for assistive devices and provide as appropriate  - Encourage maximum independence but intervene and supervise when necessary  - Involve family in performance of ADLs  - Assess for home care needs following discharge   - Consider OT consult to assist with ADL evaluation and planning for discharge  - Provide patient education as appropriate  Outcome: Progressing  Goal: Maintains/Returns to pre admission functional level  Description: INTERVENTIONS:  - Perform AM-PAC 6 Click Basic Mobility/ Daily Activity assessment daily.  - Set and communicate daily mobility goal to care team and patient/family/caregiver.   - Collaborate with rehabilitation services on mobility goals if consulted  - Perform Range of Motion 3 times a day.  - Reposition patient every 2 hours.  - Dangle patient 3 times a day  - Stand patient 3 times a day  - Ambulate patient 3 times a day  - Out of bed to chair 3 times a day   - Out of bed for meals 3 times a day  - Out of bed for toileting  - Record patient progress and toleration of activity level   Outcome: Progressing     Problem: DISCHARGE PLANNING  Goal: Discharge to home or other facility with appropriate resources  Description: INTERVENTIONS:  - Identify barriers to discharge w/patient and caregiver  - Arrange for needed discharge resources and transportation as appropriate  - Identify discharge learning needs (meds,  wound care, etc.)  - Arrange for interpretive services to assist at discharge as needed  - Refer to Case Management Department for coordinating discharge planning if the patient needs post-hospital services based on physician/advanced practitioner order or complex needs related to functional status, cognitive ability, or social support system  Outcome: Progressing     Problem: Knowledge Deficit  Goal: Patient/family/caregiver demonstrates understanding of disease process, treatment plan, medications, and discharge instructions  Description: Complete learning assessment and assess knowledge base.  Interventions:  - Provide teaching at level of understanding  - Provide teaching via preferred learning methods  Outcome: Progressing

## 2024-10-08 NOTE — PROGRESS NOTES
Progress Note - Hospitalist   Name: Joce Tolbert 58 y.o. female I MRN: [id number]  Unit/Bed#: -01 I Date of Admission: 10/7/2024   Date of Service: 10/8/2024 I Hospital Day: 1    Assessment & Plan  Sepsis (HCC)  The patient presented with extreme fatigue, weakness, was found to have elevated WBCs, and her Chemo-Port on left upper chest is tender, red, with some swelling.  Patient initially did vancomycin and cefepime, which transition to cefazolin per ID recommendation  Continue with maintenance IV fluid.  Blood culture showing positive.  Recommendation,-venous duplex of upper extremity left side negative.  CT chest abdomen reviewed.  TTE negative for vegetation  Per ID recommendation, IR consult placed for possible removal of port with culture  Patient also having diarrhea, check for C. difficile, stool enteric panel  Ovarian cancer (HCC)  The patient will continue with outpatient scheduled chemotherapy with her oncologist.  Metastasis to brain (HCC)  The patient was aware that she has a small mass with vasogenic edema on the right frontal lobe.  At this time, no acute neurological symptoms.  Dexamethasone and antiseizure medication can be started if deemed to be necessary.  Discussed with neurosurgery-May use Decadron, avoid antiepileptic at this time.  Also recommending to consult with radiation oncology (which is not available in this campus-we will try to reach out to patient on oncology, Dr. Gonzalez in a.m.)    Neoplastic malignant related fatigue  Continue the fluid resuscitation and to treat underlying cause of fatigue which is most likely cancer and ongoing chemotherapy as well as possible sepsis.  Acute kidney injury (HCC)  Creatinine was 1.3 back in September 1724  Patient presented to ER with creatinine 1.93  -Patient is getting chemotherapy for ovarian cancer, also developed sepsis could be the cause  Continue IV hydration, retention protocol  Patient having diarrhea-check C. difficile, stool  enteric panel  Bacteremia due to methicillin susceptible Staphylococcus aureus (MSSA)  As documented in sepsis section    VTE Pharmacologic Prophylaxis:    Anticoagulation remain on hold due to possible IR procedure    Mobility:   Basic Mobility Inpatient Raw Score: 18  JH-HLM Goal: 6: Walk 10 steps or more  JH-HLM Achieved: 6: Walk 10 steps or more  JH-HLM Goal achieved. Continue to encourage appropriate mobility.    Patient Centered Rounds: I performed bedside rounds with nursing staff today.   Discussions with Specialists or Other Care Team Provider: Infectious disease, neurosurgery (unofficially, over epic secure chat)    Education and Discussions with Family / Patient: Updated  (daughter) at bedside.    Current Length of Stay: 1 day(s)  Current Patient Status: Inpatient   Certification Statement: The patient will continue to require additional inpatient hospital stay due to monitor above monitor  Discharge Plan: Anticipate discharge in >72 hrs to to be determined    Code Status: Level 1 - Full Code    Subjective   Seen and evaluated during the event.  Denies any nausea, vomiting, having diarrhea.  Also feels fatigued.    Objective :  Temp:  [96.8 °F (36 °C)-97.5 °F (36.4 °C)] 97.5 °F (36.4 °C)  HR:  [] 99  BP: (105-168)/(59-84) 105/72  Resp:  [18-31] 19  SpO2:  [90 %-94 %] 93 %  O2 Device: None (Room air)  Nasal Cannula O2 Flow Rate (L/min):  [2 L/min] 2 L/min    Body mass index is 49.09 kg/m².     Input and Output Summary (last 24 hours):     Intake/Output Summary (Last 24 hours) at 10/8/2024 1537  Last data filed at 10/8/2024 1310  Gross per 24 hour   Intake 1090 ml   Output 722 ml   Net 368 ml       Physical Exam  Vitals and nursing note reviewed. Exam conducted with a chaperone present.   Constitutional:       Appearance: She is obese. She is not ill-appearing or diaphoretic.   HENT:      Mouth/Throat:      Mouth: Mucous membranes are moist.   Eyes:      General: No scleral icterus.         Left eye: No discharge.      Extraocular Movements: Extraocular movements intact.      Conjunctiva/sclera: Conjunctivae normal.      Pupils: Pupils are equal, round, and reactive to light.   Cardiovascular:      Rate and Rhythm: Normal rate.      Heart sounds: No murmur heard.     No friction rub. No gallop.   Pulmonary:      Effort: No respiratory distress.      Breath sounds: No stridor. No wheezing or rhonchi.   Chest:      Comments: Patient does have Chemo-Port in left upper chest, area looks mild weight, no significant fluctuation, no discharge noted.  Abdominal:      General: There is no distension.      Palpations: There is no mass.      Tenderness: There is no abdominal tenderness.      Hernia: No hernia is present.   Musculoskeletal:      Right lower leg: No edema.      Left lower leg: No edema.   Neurological:      Mental Status: She is alert and oriented to person, place, and time.      Cranial Nerves: No cranial nerve deficit.      Sensory: No sensory deficit.      Motor: No weakness.      Coordination: Coordination normal.         Lines/Drains:  Lines/Drains/Airways       Active Status       Name Placement date Placement time Site Days    Implantable Apheresis Port Left Chest 09/16/24  --  --  22                    Central Line:  Goal for removal:  Will remove the port in a.m. via IR               Lab Results: I have reviewed the following results:   Results from last 7 days   Lab Units 10/07/24  0930   WBC Thousand/uL 13.77*   HEMOGLOBIN g/dL 11.5   HEMATOCRIT % 34.2*   PLATELETS Thousands/uL 143*   SEGS PCT % 84*   LYMPHO PCT % 8*   MONO PCT % 6   EOS PCT % 0     Results from last 7 days   Lab Units 10/08/24  0526   SODIUM mmol/L 140   POTASSIUM mmol/L 4.0   CHLORIDE mmol/L 109*   CO2 mmol/L 20*   BUN mg/dL 24   CREATININE mg/dL 1.35*   ANION GAP mmol/L 11   CALCIUM mg/dL 8.4   ALBUMIN g/dL 3.3*   TOTAL BILIRUBIN mg/dL 0.70   ALK PHOS U/L 178*   ALT U/L 91*   AST U/L 63*   GLUCOSE RANDOM mg/dL 179*                  Results from last 7 days   Lab Units 10/07/24  0930   LACTIC ACID mmol/L 1.1   PROCALCITONIN ng/ml 1.26*       Recent Cultures (last 7 days):   Results from last 7 days   Lab Units 10/07/24  0957 10/07/24  0930   BLOOD CULTURE   --  Staphylococcus aureus*   GRAM STAIN RESULT  Gram positive cocci in clusters* Gram positive cocci in clusters*       Imaging Results Review: I personally reviewed the following image studies/reports in PACS and discussed pertinent findings with Radiology: CT abdomen/pelvis. My interpretation of the radiology images/reports is:  .  Other Study Results Review: No additional pertinent studies reviewed.    Last 24 Hours Medication List:     Current Facility-Administered Medications:     acetaminophen (TYLENOL) tablet 975 mg, Q6H PRN    ceFAZolin (ANCEF) IVPB (premix in dextrose) 2,000 mg 50 mL, Q6H, Last Rate: 2,000 mg (10/08/24 0939)    HYDROmorphone (DILAUDID) injection 0.5 mg, Q2H PRN    ondansetron (ZOFRAN) injection 4 mg, Q6H PRN    Administrative Statements   Today, Patient Was Seen By: Nemo Bajwa MD      **Please Note: This note may have been constructed using a voice recognition system.**

## 2024-10-08 NOTE — ASSESSMENT & PLAN NOTE
Creatinine was 1.3 back in September 1724  Patient presented to ER with creatinine 1.93  -Patient is getting chemotherapy for ovarian cancer, also developed sepsis could be the cause  Continue IV hydration, retention protocol  Patient having diarrhea-check C. difficile, stool enteric panel

## 2024-10-08 NOTE — ASSESSMENT & PLAN NOTE
S/p chemo, LEIDA-BSO in 2021. Now with recurrent disease, retroperitoneal lymphadenopathy. Port placed 9/16, received first treatment of paclitaxel/carboplatin on 9/24. Concern for port site infection and brain mets as noted above     Recommend port removal    Additional mngt per oncology, consider neurosurgery evaluation   06-Aug-2021 14:01

## 2024-10-09 ENCOUNTER — APPOINTMENT (INPATIENT)
Dept: INTERVENTIONAL RADIOLOGY/VASCULAR | Facility: HOSPITAL | Age: 59
DRG: 721 | End: 2024-10-09
Attending: RADIOLOGY
Payer: COMMERCIAL

## 2024-10-09 LAB
ALBUMIN SERPL BCG-MCNC: 3.1 G/DL (ref 3.5–5)
ALP SERPL-CCNC: 151 U/L (ref 34–104)
ALT SERPL W P-5'-P-CCNC: 69 U/L (ref 7–52)
ANION GAP SERPL CALCULATED.3IONS-SCNC: 10 MMOL/L (ref 4–13)
AST SERPL W P-5'-P-CCNC: 45 U/L (ref 13–39)
BASOPHILS # BLD AUTO: 0.05 THOUSANDS/ΜL (ref 0–0.1)
BASOPHILS NFR BLD AUTO: 0 % (ref 0–1)
BILIRUB SERPL-MCNC: 0.42 MG/DL (ref 0.2–1)
BUN SERPL-MCNC: 22 MG/DL (ref 5–25)
C DIFF TOX GENS STL QL NAA+PROBE: NEGATIVE
CALCIUM ALBUM COR SERPL-MCNC: 9.5 MG/DL (ref 8.3–10.1)
CALCIUM SERPL-MCNC: 8.8 MG/DL (ref 8.4–10.2)
CHLORIDE SERPL-SCNC: 109 MMOL/L (ref 96–108)
CO2 SERPL-SCNC: 23 MMOL/L (ref 21–32)
CREAT SERPL-MCNC: 1.35 MG/DL (ref 0.6–1.3)
EOSINOPHIL # BLD AUTO: 0.03 THOUSAND/ΜL (ref 0–0.61)
EOSINOPHIL NFR BLD AUTO: 0 % (ref 0–6)
ERYTHROCYTE [DISTWIDTH] IN BLOOD BY AUTOMATED COUNT: 15.4 % (ref 11.6–15.1)
GFR SERPL CREATININE-BSD FRML MDRD: 42 ML/MIN/1.73SQ M
GLUCOSE SERPL-MCNC: 142 MG/DL (ref 65–140)
HCT VFR BLD AUTO: 32 % (ref 34.8–46.1)
HGB BLD-MCNC: 10.6 G/DL (ref 11.5–15.4)
IMM GRANULOCYTES # BLD AUTO: 0.14 THOUSAND/UL (ref 0–0.2)
IMM GRANULOCYTES NFR BLD AUTO: 1 % (ref 0–2)
LYMPHOCYTES # BLD AUTO: 1.53 THOUSANDS/ΜL (ref 0.6–4.47)
LYMPHOCYTES NFR BLD AUTO: 11 % (ref 14–44)
MCH RBC QN AUTO: 28 PG (ref 26.8–34.3)
MCHC RBC AUTO-ENTMCNC: 33.1 G/DL (ref 31.4–37.4)
MCV RBC AUTO: 85 FL (ref 82–98)
MONOCYTES # BLD AUTO: 0.65 THOUSAND/ΜL (ref 0.17–1.22)
MONOCYTES NFR BLD AUTO: 5 % (ref 4–12)
NEUTROPHILS # BLD AUTO: 11.11 THOUSANDS/ΜL (ref 1.85–7.62)
NEUTS SEG NFR BLD AUTO: 83 % (ref 43–75)
NRBC BLD AUTO-RTO: 0 /100 WBCS
PLATELET # BLD AUTO: 211 THOUSANDS/UL (ref 149–390)
PMV BLD AUTO: 9.7 FL (ref 8.9–12.7)
POTASSIUM SERPL-SCNC: 3.3 MMOL/L (ref 3.5–5.3)
PROT SERPL-MCNC: 6.5 G/DL (ref 6.4–8.4)
RBC # BLD AUTO: 3.78 MILLION/UL (ref 3.81–5.12)
S AUREUS DNA BLD POS QL NAA+NON-PROBE: DETECTED
SODIUM SERPL-SCNC: 142 MMOL/L (ref 135–147)
WBC # BLD AUTO: 13.51 THOUSAND/UL (ref 4.31–10.16)

## 2024-10-09 PROCEDURE — 87505 NFCT AGENT DETECTION GI: CPT | Performed by: FAMILY MEDICINE

## 2024-10-09 PROCEDURE — 87147 CULTURE TYPE IMMUNOLOGIC: CPT | Performed by: RADIOLOGY

## 2024-10-09 PROCEDURE — 97166 OT EVAL MOD COMPLEX 45 MIN: CPT

## 2024-10-09 PROCEDURE — 85025 COMPLETE CBC W/AUTO DIFF WBC: CPT | Performed by: FAMILY MEDICINE

## 2024-10-09 PROCEDURE — 99152 MOD SED SAME PHYS/QHP 5/>YRS: CPT

## 2024-10-09 PROCEDURE — 99233 SBSQ HOSP IP/OBS HIGH 50: CPT | Performed by: FAMILY MEDICINE

## 2024-10-09 PROCEDURE — 36590 REMOVAL TUNNELED CV CATH: CPT

## 2024-10-09 PROCEDURE — 87040 BLOOD CULTURE FOR BACTERIA: CPT | Performed by: INTERNAL MEDICINE

## 2024-10-09 PROCEDURE — 02PYX3Z REMOVAL OF INFUSION DEVICE FROM GREAT VESSEL, EXTERNAL APPROACH: ICD-10-PCS | Performed by: RADIOLOGY

## 2024-10-09 PROCEDURE — NC001 PR NO CHARGE: Performed by: RADIOLOGY

## 2024-10-09 PROCEDURE — 36590 REMOVAL TUNNELED CV CATH: CPT | Performed by: RADIOLOGY

## 2024-10-09 PROCEDURE — 99152 MOD SED SAME PHYS/QHP 5/>YRS: CPT | Performed by: RADIOLOGY

## 2024-10-09 PROCEDURE — 77001 FLUOROGUIDE FOR VEIN DEVICE: CPT | Performed by: RADIOLOGY

## 2024-10-09 PROCEDURE — 87070 CULTURE OTHR SPECIMN AEROBIC: CPT | Performed by: RADIOLOGY

## 2024-10-09 PROCEDURE — 80053 COMPREHEN METABOLIC PANEL: CPT | Performed by: FAMILY MEDICINE

## 2024-10-09 PROCEDURE — 87186 SC STD MICRODIL/AGAR DIL: CPT | Performed by: RADIOLOGY

## 2024-10-09 PROCEDURE — 99233 SBSQ HOSP IP/OBS HIGH 50: CPT | Performed by: INTERNAL MEDICINE

## 2024-10-09 RX ORDER — LIDOCAINE WITH 8.4% SOD BICARB 0.9%(10ML)
SYRINGE (ML) INJECTION AS NEEDED
Status: COMPLETED | OUTPATIENT
Start: 2024-10-09 | End: 2024-10-09

## 2024-10-09 RX ORDER — FENTANYL CITRATE 50 UG/ML
INJECTION, SOLUTION INTRAMUSCULAR; INTRAVENOUS AS NEEDED
Status: COMPLETED | OUTPATIENT
Start: 2024-10-09 | End: 2024-10-09

## 2024-10-09 RX ORDER — METOPROLOL SUCCINATE 50 MG/1
50 TABLET, EXTENDED RELEASE ORAL EVERY 12 HOURS SCHEDULED
Status: DISCONTINUED | OUTPATIENT
Start: 2024-10-09 | End: 2024-10-14 | Stop reason: HOSPADM

## 2024-10-09 RX ORDER — POTASSIUM CHLORIDE 14.9 MG/ML
20 INJECTION INTRAVENOUS
Status: COMPLETED | OUTPATIENT
Start: 2024-10-09 | End: 2024-10-09

## 2024-10-09 RX ORDER — MIDAZOLAM HYDROCHLORIDE 2 MG/2ML
INJECTION, SOLUTION INTRAMUSCULAR; INTRAVENOUS AS NEEDED
Status: COMPLETED | OUTPATIENT
Start: 2024-10-09 | End: 2024-10-09

## 2024-10-09 RX ORDER — TIZANIDINE 2 MG/1
4 TABLET ORAL EVERY 6 HOURS PRN
Status: DISCONTINUED | OUTPATIENT
Start: 2024-10-09 | End: 2024-10-14 | Stop reason: HOSPADM

## 2024-10-09 RX ORDER — DEXAMETHASONE SODIUM PHOSPHATE 4 MG/ML
4 INJECTION, SOLUTION INTRA-ARTICULAR; INTRALESIONAL; INTRAMUSCULAR; INTRAVENOUS; SOFT TISSUE EVERY 6 HOURS SCHEDULED
Status: DISCONTINUED | OUTPATIENT
Start: 2024-10-09 | End: 2024-10-14 | Stop reason: HOSPADM

## 2024-10-09 RX ORDER — LEVOTHYROXINE SODIUM 88 UG/1
88 TABLET ORAL
Status: DISCONTINUED | OUTPATIENT
Start: 2024-10-09 | End: 2024-10-14 | Stop reason: HOSPADM

## 2024-10-09 RX ORDER — PYRIDOXINE HCL (VITAMIN B6) 50 MG
100 TABLET ORAL DAILY
Status: DISCONTINUED | OUTPATIENT
Start: 2024-10-09 | End: 2024-10-14 | Stop reason: HOSPADM

## 2024-10-09 RX ORDER — HEPARIN SODIUM 5000 [USP'U]/ML
5000 INJECTION, SOLUTION INTRAVENOUS; SUBCUTANEOUS EVERY 8 HOURS SCHEDULED
Status: DISCONTINUED | OUTPATIENT
Start: 2024-10-09 | End: 2024-10-14 | Stop reason: HOSPADM

## 2024-10-09 RX ORDER — GABAPENTIN 300 MG/1
300 CAPSULE ORAL 2 TIMES DAILY
Status: DISCONTINUED | OUTPATIENT
Start: 2024-10-09 | End: 2024-10-14 | Stop reason: HOSPADM

## 2024-10-09 RX ADMIN — GABAPENTIN 300 MG: 300 CAPSULE ORAL at 11:41

## 2024-10-09 RX ADMIN — CEFAZOLIN SODIUM 2000 MG: 2 SOLUTION INTRAVENOUS at 03:25

## 2024-10-09 RX ADMIN — CEFAZOLIN SODIUM 2000 MG: 2 SOLUTION INTRAVENOUS at 22:00

## 2024-10-09 RX ADMIN — METOPROLOL SUCCINATE 50 MG: 50 TABLET, EXTENDED RELEASE ORAL at 21:49

## 2024-10-09 RX ADMIN — DEXAMETHASONE SODIUM PHOSPHATE 4 MG: 4 INJECTION INTRA-ARTICULAR; INTRALESIONAL; INTRAMUSCULAR; INTRAVENOUS; SOFT TISSUE at 17:01

## 2024-10-09 RX ADMIN — CEFAZOLIN SODIUM 2000 MG: 2 SOLUTION INTRAVENOUS at 14:42

## 2024-10-09 RX ADMIN — HEPARIN SODIUM 5000 UNITS: 5000 INJECTION, SOLUTION INTRAVENOUS; SUBCUTANEOUS at 21:50

## 2024-10-09 RX ADMIN — PYRIDOXINE HCL TAB 50 MG 100 MG: 50 TAB at 11:41

## 2024-10-09 RX ADMIN — CEFAZOLIN SODIUM 2000 MG: 2 SOLUTION INTRAVENOUS at 08:22

## 2024-10-09 RX ADMIN — LEVOTHYROXINE SODIUM 88 MCG: 88 TABLET ORAL at 11:41

## 2024-10-09 RX ADMIN — GABAPENTIN 300 MG: 300 CAPSULE ORAL at 17:02

## 2024-10-09 RX ADMIN — MIDAZOLAM HYDROCHLORIDE 1 MG: 1 INJECTION, SOLUTION INTRAMUSCULAR; INTRAVENOUS at 13:23

## 2024-10-09 RX ADMIN — METOPROLOL SUCCINATE 50 MG: 50 TABLET, EXTENDED RELEASE ORAL at 11:41

## 2024-10-09 RX ADMIN — POTASSIUM CHLORIDE 20 MEQ: 14.9 INJECTION, SOLUTION INTRAVENOUS at 08:56

## 2024-10-09 RX ADMIN — FENTANYL CITRATE 50 MCG: 50 INJECTION, SOLUTION INTRAMUSCULAR; INTRAVENOUS at 13:23

## 2024-10-09 RX ADMIN — ACETAMINOPHEN 325MG 975 MG: 325 TABLET ORAL at 19:40

## 2024-10-09 RX ADMIN — DEXAMETHASONE SODIUM PHOSPHATE 4 MG: 4 INJECTION INTRA-ARTICULAR; INTRALESIONAL; INTRAMUSCULAR; INTRAVENOUS; SOFT TISSUE at 11:41

## 2024-10-09 RX ADMIN — POTASSIUM CHLORIDE 20 MEQ: 14.9 INJECTION, SOLUTION INTRAVENOUS at 11:21

## 2024-10-09 NOTE — PROGRESS NOTES
Progress Note - Hospitalist   Name: Joce Tolbert 58 y.o. female I MRN: [id number]  Unit/Bed#: -01 I Date of Admission: 10/7/2024   Date of Service: 10/9/2024 I Hospital Day: 2    Assessment & Plan  Sepsis (HCC)  The patient presented with extreme fatigue, weakness, was found to have elevated WBCs, and her Chemo-Port on left upper chest is tender, red, with some swelling.  Patient initially did vancomycin and cefepime, which transition to cefazolin per ID recommendation  Continue with maintenance IV fluid.  Blood culture showing positive.  Recommendation,-venous duplex of upper extremity left side negative.  CT chest abdomen reviewed.  TTE negative for vegetation  Per ID recommendation, IR consult placed for possible removal of port with culture-status post port removal, pending culture  Patient also having diarrhea, stool enteric panel  Stool C. difficile negative.  Per ID, continue IV antibiotic, expecting 4 to 6 weeks of antibiotic therapy.  Ovarian cancer (HCC)  The patient will continue with outpatient scheduled chemotherapy with her oncologist.  Metastasis to brain (HCC)  The patient was aware that she has a small mass with vasogenic edema on the right frontal lobe.  At this time, no acute neurological symptoms.  Dexamethasone and antiseizure medication can be started if deemed to be necessary.  Discussed with neurosurgery-May use Decadron, avoid antiepileptic at this time.  Also recommending to consult with radiation oncology (which is not available in this campus-we will try to reach out to patient on oncology, Dr. Gonzalez in a.m.)-try to reach Dr. Gonzalez's office, the attending is off today, I will try to reach you tomorrow  After discussion has done with ID regarding neurosurgery recommendation about and Decadron, since patient already on IV antibiotic for 48 hours, can resume Decadron 4 vasogenic edema.    Neoplastic malignant related fatigue  Continue the fluid resuscitation and to treat  underlying cause of fatigue which is most likely cancer and ongoing chemotherapy as well as possible sepsis.  Acute kidney injury (HCC)  Creatinine was 1.3 back in September 1724  Patient presented to ER with creatinine 1.93  -Patient is getting chemotherapy for ovarian cancer, also developed sepsis could be the cause  Continue IV hydration, retention protocol  Patient having diarrhea-check C. difficile, stool enteric panel  Bacteremia due to methicillin susceptible Staphylococcus aureus (MSSA)  As documented in sepsis section  Morbid obesity (HCC)  Lifestyle modification    VTE Pharmacologic Prophylaxis:   Moderate Risk (Score 3-4) - Pharmacological DVT Prophylaxis Ordered: heparin.    Mobility:   Basic Mobility Inpatient Raw Score: 18  JH-HLM Goal: 6: Walk 10 steps or more  JH-HLM Achieved: 6: Walk 10 steps or more  JH-HLM Goal achieved. Continue to encourage appropriate mobility.    Patient Centered Rounds: I performed bedside rounds with nursing staff today.   Discussions with Specialists or Other Care Team Provider: Infectious disease, IR    Education and Discussions with Family / Patient:  With patient.     Current Length of Stay: 2 day(s)  Current Patient Status: Inpatient   Certification Statement: The patient will continue to require additional inpatient hospital stay due to monitorable function  Discharge Plan: Anticipate discharge in 48-72 hrs to to be determined    Code Status: Level 1 - Full Code    Subjective   Seen and evaluated during the rounding.  Sitting up at position.  He still having mild diarrhea.  Denies any nausea, vomiting.  Reports she feels much better compared to yesterday.    Objective :  Temp:  [97.5 °F (36.4 °C)-97.7 °F (36.5 °C)] 97.7 °F (36.5 °C)  HR:  [77-99] 77  BP: (105-114)/(66-72) 114/66  Resp:  [18-19] 18  SpO2:  [91 %-96 %] 91 %  O2 Device: Nasal cannula  Nasal Cannula O2 Flow Rate (L/min):  [2 L/min] 2 L/min    Body mass index is 49.09 kg/m².     Input and Output Summary  (last 24 hours):     Intake/Output Summary (Last 24 hours) at 10/9/2024 0839  Last data filed at 10/8/2024 1700  Gross per 24 hour   Intake 600 ml   Output --   Net 600 ml       Physical Exam  Vitals and nursing note reviewed.   Constitutional:       Appearance: She is obese. She is not ill-appearing or diaphoretic.   HENT:      Mouth/Throat:      Mouth: Mucous membranes are moist.   Eyes:      General: No scleral icterus.        Left eye: No discharge.      Conjunctiva/sclera: Conjunctivae normal.      Pupils: Pupils are equal, round, and reactive to light.   Cardiovascular:      Rate and Rhythm: Normal rate.      Heart sounds: No murmur heard.     No friction rub. No gallop.   Pulmonary:      Effort: Pulmonary effort is normal. No respiratory distress.      Breath sounds: No stridor. No wheezing or rhonchi.   Chest:      Comments: The port area looks red during this round.  Musculoskeletal:      Right lower leg: No edema.      Left lower leg: No edema.   Neurological:      Mental Status: She is alert and oriented to person, place, and time.      Cranial Nerves: No cranial nerve deficit.      Sensory: No sensory deficit.      Motor: No weakness.      Coordination: Coordination normal.         Lines/Drains:  Lines/Drains/Airways       Active Status       Name Placement date Placement time Site Days    Implantable Apheresis Port Left Chest 09/16/24  --  --  23                    Central Line:  Goal for removal:  removed               Lab Results: I have reviewed the following results:   Results from last 7 days   Lab Units 10/09/24  0452   WBC Thousand/uL 13.51*   HEMOGLOBIN g/dL 10.6*   HEMATOCRIT % 32.0*   PLATELETS Thousands/uL 211   SEGS PCT % 83*   LYMPHO PCT % 11*   MONO PCT % 5   EOS PCT % 0     Results from last 7 days   Lab Units 10/09/24  0452   SODIUM mmol/L 142   POTASSIUM mmol/L 3.3*   CHLORIDE mmol/L 109*   CO2 mmol/L 23   BUN mg/dL 22   CREATININE mg/dL 1.35*   ANION GAP mmol/L 10   CALCIUM mg/dL 8.8    ALBUMIN g/dL 3.1*   TOTAL BILIRUBIN mg/dL 0.42   ALK PHOS U/L 151*   ALT U/L 69*   AST U/L 45*   GLUCOSE RANDOM mg/dL 142*                 Results from last 7 days   Lab Units 10/07/24  0930   LACTIC ACID mmol/L 1.1   PROCALCITONIN ng/ml 1.26*       Recent Cultures (last 7 days):   Results from last 7 days   Lab Units 10/07/24  0957 10/07/24  0930   BLOOD CULTURE   --  Staphylococcus aureus*   GRAM STAIN RESULT  Gram positive cocci in clusters* Gram positive cocci in clusters*       Imaging Results Review: No pertinent imaging studies reviewed.  Other Study Results Review: No additional pertinent studies reviewed.    Last 24 Hours Medication List:     Current Facility-Administered Medications:     acetaminophen (TYLENOL) tablet 975 mg, Q6H PRN    ceFAZolin (ANCEF) IVPB (premix in dextrose) 2,000 mg 50 mL, Q6H, Last Rate: 2,000 mg (10/09/24 0822)    HYDROmorphone (DILAUDID) injection 0.5 mg, Q2H PRN    ondansetron (ZOFRAN) injection 4 mg, Q6H PRN    Administrative Statements   Today, Patient Was Seen By: Nemo Bajwa MD      **Please Note: This note may have been constructed using a voice recognition system.**

## 2024-10-09 NOTE — BRIEF OP NOTE (RAD/CATH)
INTERVENTIONAL RADIOLOGY PROCEDURE NOTE    Date: 10/9/2024    Procedure: Port removal  Procedure Summary       Date:  Room / Location:     Anesthesia Start:  Anesthesia Stop:     Procedure:  Diagnosis:     Scheduled Providers:  Responsible Provider:     Anesthesia Type: Not recorded ASA Status: Not recorded            Preoperative diagnosis:   1. Brain mass    2. Generalized weakness    3. Syncope    4. DIANA (acute kidney injury) (HCC)    5. Bacteremia         Postoperative diagnosis: Same.    Surgeon: Bill Mckinnon DO     Assistant: None. No qualified resident was available.    Blood loss: minimal    Specimens: port for culture     Findings: no erythema or purulent material identified.  Port removed in its entirely    Complications: None immediate.    Anesthesia: conscious sedation and local

## 2024-10-09 NOTE — PLAN OF CARE
Problem: PAIN - ADULT  Goal: Verbalizes/displays adequate comfort level or baseline comfort level  Description: Interventions:  - Encourage patient to monitor pain and request assistance  - Assess pain using appropriate pain scale  - Administer analgesics based on type and severity of pain and evaluate response  - Implement non-pharmacological measures as appropriate and evaluate response  - Consider cultural and social influences on pain and pain management  - Notify physician/advanced practitioner if interventions unsuccessful or patient reports new pain  Outcome: Progressing     Problem: INFECTION - ADULT  Goal: Absence or prevention of progression during hospitalization  Description: INTERVENTIONS:  - Assess and monitor for signs and symptoms of infection  - Monitor lab/diagnostic results  - Monitor all insertion sites, i.e. indwelling lines, tubes, and drains  - Monitor endotracheal if appropriate and nasal secretions for changes in amount and color  - [place] appropriate cooling/warming therapies per order  - Administer medications as ordered  - Instruct and encourage patient and family to use good hand hygiene technique  - Identify and instruct in appropriate isolation precautions for identified infection/condition  Outcome: Progressing     Problem: SAFETY ADULT  Goal: Patient will remain free of falls  Description: INTERVENTIONS:  - Educate patient/family on patient safety including physical limitations  - Instruct patient to call for assistance with activity   - Consult OT/PT to assist with strengthening/mobility   - Keep Call bell within reach  - Keep bed low and locked with side rails adjusted as appropriate  - Keep care items and personal belongings within reach  - Initiate and maintain comfort rounds  - Make Fall Risk Sign visible to staff  - Offer Toileting every 2 Hours, in advance of need  - Initiate/Maintain bed alarm  - Obtain necessary fall risk management equipment: non-skid socks  - Apply  yellow socks and bracelet for high fall risk patients  - Consider moving patient to room near nurses station  Outcome: Progressing  Goal: Maintain or return to baseline ADL function  Description: INTERVENTIONS:  -  Assess patient's ability to carry out ADLs; assess patient's baseline for ADL function and identify physical deficits which impact ability to perform ADLs (bathing, care of mouth/teeth, toileting, grooming, dressing, etc.)  - Assess/evaluate cause of self-care deficits   - Assess range of motion  - Assess patient's mobility; develop plan if impaired  - Assess patient's need for assistive devices and provide as appropriate  - Encourage maximum independence but intervene and supervise when necessary  - Involve family in performance of ADLs  - Assess for home care needs following discharge   - Consider OT consult to assist with ADL evaluation and planning for discharge  - Provide patient education as appropriate  Outcome: Progressing  Goal: Maintains/Returns to pre admission functional level  Description: INTERVENTIONS:  - Perform AM-PAC 6 Click Basic Mobility/ Daily Activity assessment daily.  - Set and communicate daily mobility goal to care team and patient/family/caregiver.   - Collaborate with rehabilitation services on mobility goals if consulted  - Out of bed to chair 3 times a day   - Out of bed for meals 3 times a day  - Out of bed for toileting  - Record patient progress and toleration of activity level   Outcome: Progressing    Problem: DISCHARGE PLANNING  Goal: Discharge to home or other facility with appropriate resources  Description: INTERVENTIONS:  - Identify barriers to discharge w/patient and caregiver  - Arrange for needed discharge resources and transportation as appropriate  - Identify discharge learning needs (meds, wound care, etc.)  - Arrange for interpretive services to assist at discharge as needed  - Refer to Case Management Department for coordinating discharge planning if the  patient needs post-hospital services based on physician/advanced practitioner order or complex needs related to functional status, cognitive ability, or social support system  Outcome: Progressing     Problem: Knowledge Deficit  Goal: Patient/family/caregiver demonstrates understanding of disease process, treatment plan, medications, and discharge instructions  Description: Complete learning assessment and assess knowledge base.  Interventions:  - Provide teaching at level of understanding  - Provide teaching via preferred learning methods  Outcome: Progressing

## 2024-10-09 NOTE — PLAN OF CARE
Problem: PAIN - ADULT  Goal: Verbalizes/displays adequate comfort level or baseline comfort level  Description: Interventions:  - Encourage patient to monitor pain and request assistance  - Assess pain using appropriate pain scale  - Administer analgesics based on type and severity of pain and evaluate response  - Implement non-pharmacological measures as appropriate and evaluate response  - Consider cultural and social influences on pain and pain management  - Notify physician/advanced practitioner if interventions unsuccessful or patient reports new pain  Outcome: Progressing     Problem: INFECTION - ADULT  Goal: Absence or prevention of progression during hospitalization  Description: INTERVENTIONS:  - Assess and monitor for signs and symptoms of infection  - Monitor lab/diagnostic results  - Monitor all insertion sites, i.e. indwelling lines, tubes, and drains  - Monitor endotracheal if appropriate and nasal secretions for changes in amount and color  - [place] appropriate cooling/warming therapies per order  - Administer medications as ordered  - Instruct and encourage patient and family to use good hand hygiene technique  - Identify and instruct in appropriate isolation precautions for identified infection/condition  Outcome: Progressing     Problem: DISCHARGE PLANNING  Goal: Discharge to home or other facility with appropriate resources  Description: INTERVENTIONS:  - Identify barriers to discharge w/patient and caregiver  - Arrange for needed discharge resources and transportation as appropriate  - Identify discharge learning needs (meds, wound care, etc.)  - Arrange for interpretive services to assist at discharge as needed  - Refer to Case Management Department for coordinating discharge planning if the patient needs post-hospital services based on physician/advanced practitioner order or complex needs related to functional status, cognitive ability, or social support system  Outcome: Progressing      Problem: Knowledge Deficit  Goal: Patient/family/caregiver demonstrates understanding of disease process, treatment plan, medications, and discharge instructions  Description: Complete learning assessment and assess knowledge base.  Interventions:  - Provide teaching at level of understanding  - Provide teaching via preferred learning methods  Outcome: Progressing

## 2024-10-09 NOTE — PROGRESS NOTES
Progress Note - Infectious Disease   Name: Joce Tolbert 58 y.o. female I MRN: [id number]  Unit/Bed#: -01 I Date of Admission: 10/7/2024   Date of Service: 10/9/2024 I Hospital Day: 2         VIRTUAL CARE DOCUMENTATION:     1. This service was provided via Telemedicine using [employer]     2. Parties in the room with patient during teleconsult Patient only    3. Confidentiality My office door was closed     4. Participants No one else was in the room    5. Patient acknowledged consent and understanding of privacy and security of the  Telemedicine consult. I informed the patient that I have reviewed their record in Epic and presented the opportunity for them to ask any questions regarding the visit today.  The patient agreed to participate.    6. Time spent 5 minutes     Assessment & Plan  Sepsis (HCC)  Leukocytosis, tachycardia, tachypnea. Due to MSSA bacteremia, port infection. Hemodynamically stable, afebrile with improving leukocytosis     Continue supportive care   Check daily CBC, CMP while inpatient to monitor for any evolving antibiotic toxicity or treatment failure   Antibiotics and additional management as below   Monitor stool output with new diarrhea but stool for c diff testing is negative  Bacteremia due to methicillin susceptible Staphylococcus aureus (MSSA)  Both sets of blood cx from admission with MSSA.   Suspect catheter associated bacteremia given local signs of infection (redness, swelling) along Left port site. S/p removal on 10/9, no purulence noted. Rule out endovascular infection but TTE is without vegetation. No other indwelling vascular or prosthetic devices. Duplex without acute DVT, has chronic RIJ thrombus.   CT chest/abd without septic pulmonary emboli or other infectious focus.   Has a newly detected R frontal lobe enhancing mass but on MRI appears more consistent with mets rather than septic cerebral emboli.      Continue cefazolin 2 q6h   FU repeat blood cx and cath tip  cx   Recommend KATINA if blood cx fail to clear    Final duration of antibiotics to be determined, anticipate 4-6 weeks of therapy and consult IR for venous access for iv abx once repeat blood cx are negative for 48 hrs. Will need to retain catheter for chemotherapy      Ovarian cancer (HCC)  S/p chemo, LEIDA-BSO in . Now with recurrent disease, retroperitoneal lymphadenopathy. Port placed , received first treatment of paclitaxel/carboplatin on . Concern for port site infection and brain mets as noted above     Additional mngt per oncology, continue steroids per neurosurgery  Acute kidney injury (HCC)  Likely pre-renal/septic. Creatinine is improving     Avoid nephrotoxins, renally dose antibiotics  Transaminitis  Acute mild elevation in ALT, AST and ALP. Possibly secondary to sepsis and/or chemotherapy related. Also has underlying hepatic steatosis. Resolving     Trend LFTs  Morbid obesity (HCC)  A1C 6.3 in 3/2024. Risk factor for infection        Reviewed recommendations to continue iv abx, fu cx  with primary team who is in agreement. Will follow.  Please call with concerns or any changes in clinical status or new test results.      Subjective:  The patient has no complaints. Underwent port removal yesterday.  Denies fevers, chills, or sweats.  Denies nausea, vomiting. Had 2 loose stools yesterday.    Antibiotics:  cefazolin    Physical Exam     Temp:  [97.5 °F (36.4 °C)-97.8 °F (36.6 °C)] 97.5 °F (36.4 °C)  HR:  [77-95] 89  Resp:  [17-27] 17  BP: (114-141)/(63-83) 123/70  SpO2:  [90 %-96 %] 94 %  Temp (24hrs), Av.7 °F (36.5 °C), Min:97.5 °F (36.4 °C), Max:97.8 °F (36.6 °C)  Current: Temperature: 97.5 °F (36.4 °C)    Intake/Output Summary (Last 24 hours) at 10/9/2024 1958  Last data filed at 10/9/2024 1704  Gross per 24 hour   Intake 780 ml   Output --   Net 780 ml       Physical exam findings reported by bedside and primary medical team staff      General Appearance:  Appearing fatigued but well,  nontoxic, and in no distress, appears stated age   Lungs:   Clear to auscultation bilaterally, no audible wheezes, rhonchi and rales, respirations unlabored   Heart:  Regular rate and rhythm, S1, S2 normal, no murmur, rub or gallop   Abdomen:   Soft, non-tender, non-distended, positive bowel sounds, no masses, no organomegaly    No CVA tenderness   Extremities: Extremities normal, atraumatic, no cyanosis, clubbing or edema   Skin: Skin color, texture, turgor normal, no rashes or lesions. No draining wounds noted.   Neurologic: Alert and oriented times 3,         Invasive Devices:   Implantable Apheresis Port Left Chest (Active)   De-accessed by: previous hospital chemo visit Nurse, per patient 10/07/24 0910   Site Assessment Intact;Dry;Red 10/08/24 1000       Peripheral IV 10/07/24 Right Antecubital (Active)   Site Assessment WDL 10/08/24 1000   Dressing Type Transparent 10/08/24 1000   Line Status Infusing 10/08/24 1000   Dressing Status Clean;Dry;Intact 10/08/24 1000   Dressing Change Due 10/11/24 10/08/24 0200   Reason Not Rotated Not due 10/08/24 0200       Labs, Imaging, & Other Studies     Lab Results:    I have personally reviewed pertinent labs.    Results from last 7 days   Lab Units 10/09/24  0452 10/07/24  0930   WBC Thousand/uL 13.51* 13.77*   HEMOGLOBIN g/dL 10.6* 11.5   PLATELETS Thousands/uL 211 143*     Results from last 7 days   Lab Units 10/09/24  0452 10/08/24  0526 10/07/24  0930   POTASSIUM mmol/L 3.3* 4.0 3.7   CHLORIDE mmol/L 109* 109* 99   CO2 mmol/L 23 20* 24   BUN mg/dL 22 24 29*   CREATININE mg/dL 1.35* 1.35* 1.93*   EGFR ml/min/1.73sq m 42 42 27   CALCIUM mg/dL 8.8 8.4 9.3   AST U/L 45* 63* 52*   ALT U/L 69* 91* 74*   ALK PHOS U/L 151* 178* 185*     Results from last 7 days   Lab Units 10/09/24  0722 10/08/24  1649 10/07/24  0957 10/07/24  0930   BLOOD CULTURE  Received in Microbiology Lab. Culture in Progress.  Received in Microbiology Lab. Culture in Progress.  --  Staphylococcus  aureus* Staphylococcus aureus*   GRAM STAIN RESULT   --   --  Gram positive cocci in clusters* Gram positive cocci in clusters*   C DIFF TOXIN B BY PCR   --  Negative  --   --        Imaging Studies:   I have personally reviewed pertinent imaging study reports and images in PACS.      EKG, Pathology, and Other Studies:   I have personally reviewed pertinent reports.        Counseling/Coordination of care:       Total 50 minutes in evaluation of the patient and communication with the patient via telehealth of which 30 minutes was in counseling/coordination of care.  Extensive review of the medical records in epic including review of the notes, radiographs, and laboratory results.  My recommendations were discussed with the patient in detail who verbalized understanding.

## 2024-10-09 NOTE — PLAN OF CARE
Problem: PAIN - ADULT  Goal: Verbalizes/displays adequate comfort level or baseline comfort level  Description: Interventions:  - Encourage patient to monitor pain and request assistance  - Assess pain using appropriate pain scale  - Administer analgesics based on type and severity of pain and evaluate response  - Implement non-pharmacological measures as appropriate and evaluate response  - Consider cultural and social influences on pain and pain management  - Notify physician/advanced practitioner if interventions unsuccessful or patient reports new pain  Outcome: Progressing     Problem: INFECTION - ADULT  Goal: Absence or prevention of progression during hospitalization  Description: INTERVENTIONS:  - Assess and monitor for signs and symptoms of infection  - Monitor lab/diagnostic results  - Monitor all insertion sites, i.e. indwelling lines, tubes, and drains  - Monitor endotracheal if appropriate and nasal secretions for changes in amount and color  - [place] appropriate cooling/warming therapies per order  - Administer medications as ordered  - Instruct and encourage patient and family to use good hand hygiene technique  - Identify and instruct in appropriate isolation precautions for identified infection/condition  Outcome: Progressing     Problem: SAFETY ADULT  Goal: Patient will remain free of falls  Description: INTERVENTIONS:  - Educate patient/family on patient safety including physical limitations  - Instruct patient to call for assistance with activity   - Consult OT/PT to assist with strengthening/mobility   - Keep Call bell within reach  - Keep bed low and locked with side rails adjusted as appropriate  - Keep care items and personal belongings within reach  - Initiate and maintain comfort rounds  - Make Fall Risk Sign visible to staff  - Offer Toileting every 2 Hours, in advance of need  - Initiate/Maintain bed alarm  - Obtain necessary fall risk management equipment: bed check  - Apply yellow  socks and bracelet for high fall risk patients  - Consider moving patient to room near nurses station  Outcome: Progressing  Goal: Maintain or return to baseline ADL function  Description: INTERVENTIONS:  -  Assess patient's ability to carry out ADLs; assess patient's baseline for ADL function and identify physical deficits which impact ability to perform ADLs (bathing, care of mouth/teeth, toileting, grooming, dressing, etc.)  - Assess/evaluate cause of self-care deficits   - Assess range of motion  - Assess patient's mobility; develop plan if impaired  - Assess patient's need for assistive devices and provide as appropriate  - Encourage maximum independence but intervene and supervise when necessary  - Involve family in performance of ADLs  - Assess for home care needs following discharge   - Consider OT consult to assist with ADL evaluation and planning for discharge  - Provide patient education as appropriate  Outcome: Progressing  Goal: Maintains/Returns to pre admission functional level  Description: INTERVENTIONS:  - Perform AM-PAC 6 Click Basic Mobility/ Daily Activity assessment daily.  - Set and communicate daily mobility goal to care team and patient/family/caregiver.   - Collaborate with rehabilitation services on mobility goals if consulted    - Reposition patient every 2 hours.    - Out of bed for toileting  - Record patient progress and toleration of activity level   Outcome: Progressing     Problem: DISCHARGE PLANNING  Goal: Discharge to home or other facility with appropriate resources  Description: INTERVENTIONS:  - Identify barriers to discharge w/patient and caregiver  - Arrange for needed discharge resources and transportation as appropriate  - Identify discharge learning needs (meds, wound care, etc.)  - Arrange for interpretive services to assist at discharge as needed  - Refer to Case Management Department for coordinating discharge planning if the patient needs post-hospital services based  on physician/advanced practitioner order or complex needs related to functional status, cognitive ability, or social support system  Outcome: Progressing     Problem: Knowledge Deficit  Goal: Patient/family/caregiver demonstrates understanding of disease process, treatment plan, medications, and discharge instructions  Description: Complete learning assessment and assess knowledge base.  Interventions:  - Provide teaching at level of understanding  - Provide teaching via preferred learning methods  Outcome: Progressing

## 2024-10-09 NOTE — DISCHARGE INSTRUCTIONS
[facility] Interventional Radiology        [address] [place] [address]             Phone:  [phone]                  IR Scheduling: [phone]                                                                                                                                                      Peripherally Inserted Central Catheter     WHAT YOU NEED TO KNOW:   A PICC is an IV placed into a large blood vessel near your heart. It is usually inserted through a blood vessel in your arm. Your PICC may have multiple ports. Ports are tubes where you can inject medicine. A PICC can stay in place for several weeks or months. You may need a PICC to get nutrition, medicine, or fluids. Blood samples can be removed from your PICC and sent to the lab for tests.   DISCHARGE INSTRUCTIONS:    [facility], [place] and [place] patients,    Contact Interventional Radiology at [phone]   YVON PATIENTS: Contact Interventional Radiology at [phone]   MAXIMUS PATIENTS: Contact Interventional Radiology at [phone] if:  Blood soaks through your bandage.    Your arm or leg feels warm, tender, and painful. It may look swollen and red.   You have trouble moving your arm.    Your catheter falls out.  You have a fever or swelling, redness, pain, or pus where the catheter was inserted.  Persistent nausea or vomiting.    You cannot flush your catheter, or you feel pain when you flush your catheter.    You see a hole or crack in the tubing of your catheter.    You see fluid leaking from the insertion site.    You run out of supplies to care for your catheter.    You have questions or concerns about your condition or care.

## 2024-10-09 NOTE — ASSESSMENT & PLAN NOTE
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  At this time, no acute neurological symptoms.  Dexamethasone and antiseizure medication can be started if deemed to be necessary.  Discussed with neurosurgery-May use Decadron, avoid antiepileptic at this time.  Also recommending to consult with radiation oncology (which is not available in this campus-we will try to reach out to patient on oncology, Dr. Gonzalez in a.m.)-try to reach Dr. Gonzalez's office, the attending is off today, I will try to reach you tomorrow  After discussion has done with ID regarding neurosurgery recommendation about and Decadron, since patient already on IV antibiotic for 48 hours, can resume Decadron 4 vasogenic edema.

## 2024-10-09 NOTE — NURSING NOTE
Patient received from IR. She is on bedrest for 1 hour. Incision to left chest is AMY with edges approximated. She has no complaints, VSS.

## 2024-10-09 NOTE — PROGRESS NOTES
Patient arrived to IR for port removal    The procedure and risks were discussed with the patient.  All questions were answered. Informed consent was obtained.

## 2024-10-09 NOTE — OCCUPATIONAL THERAPY NOTE
Occupational Therapy Evaluation     Patient Name: Joce Tolbert  Today's Date: 10/9/2024  Problem List  Principal Problem:    Sepsis (HCC)  Active Problems:    Acute kidney injury (HCC)    Ovarian cancer (HCC)    Bacteremia due to methicillin susceptible Staphylococcus aureus (MSSA)    Metastasis to brain (HCC)    Neoplastic malignant related fatigue    Morbid obesity (HCC)    Transaminitis    Past Medical History  Past Medical History:   Diagnosis Date    Diabetes mellitus (HCC)     Hypercholesterolemia     Hypertension      Past Surgical History  History reviewed. No pertinent surgical history.      10/09/24 0927   OT Last Visit   OT Visit Date 10/09/24   Note Type   Note type Evaluation   Pain Assessment   Pain Assessment Tool 0-10   Pain Score No Pain   Restrictions/Precautions   Weight Bearing Precautions Per Order No   Other Precautions Multiple lines;O2;Chair Alarm;Bed Alarm  (2L O2)   Home Living   Type of Home House   Home Layout Two level;Bed/bath upstairs  (3 DEB c B rails, full flight to 2nd floor with single rail)   Bathroom Shower/Tub Tub/shower unit   Bathroom Toilet Raised   Bathroom Equipment Grab bars in shower;Shower chair  (was not using shower chair PTA but owns one)   Bathroom Accessibility Accessible   Home Equipment Cane  (owns cane but was not using PTA)   Additional Comments Pt reports she has a good support system with her neighbors and they help eachother out. Daughter is in and out of the hospital and cannot physically assist patient but neighbors are very helpful and can A if needed.   Prior Function   Level of [place] Independent with functional mobility   Lives With Daughter  (has sickle cell)   Receives Help From Family;Neighbor   IADLs Independent with meal prep;Independent with medication management;Family/Friend/Other provides transportation  (neighbors provide transportation)   Falls in the last 6 months 1 to 4  (2)   Comments independent PTA no AD   General  "  Family/Caregiver Present No   Subjective   Subjective \"I am feeling better than I was when I came in.\"   ADL   Where Assessed   (recliner chair)   Eating Assistance 7  Independent   UB Bathing Assistance 6  Modified Independent  (simulated)   UB Bathing Deficit Increased time to complete   LB Bathing Assistance 6  Modified Independent  (simulated)   LB Bathing Deficit Increased time to complete   LB Dressing Assistance 6  Modified independent   LB Dressing Deficit Increased time to complete   Toileting Assistance  5  Supervision/Setup   Toileting Deficit Supervison/safety;Increased time to complete   Additional Comments pt required increased time for ADL participation d/t IV running throughout session; pt very cautious with movement   Bed Mobility   Additional Comments OOB at start and end of session   Transfers   Sit to Stand 5  Supervision   Stand to Sit 5  Supervision   Stand pivot 5  Supervision   Additional items   (supervision for safety)   Toilet transfer 5  Supervision   Additional Comments Pt completed all functional transfers throughout session c supervision for safety; no use of AD.   Functional Mobility   Functional Mobility   (SBA)   Additional Comments pt took a few steps forward and backward at recliner chair d/t active IV and pt report that she had used bathroom prior to session   Additional items   (no AD)   Balance   Static Sitting Normal   Dynamic Sitting Good   Static Standing Fair +   Dynamic Standing Fair   Ambulatory Fair   Activity Tolerance   Activity Tolerance Patient limited by fatigue   Nurse Made Aware PAIGE Lovell   RUE Assessment   RUE Assessment WNL   LUE Assessment   LUE Assessment WNL   Hand Function   Gross Motor Coordination Functional   Fine Motor Coordination Functional   Sensation   Light Touch No apparent deficits   Vision-Basic Assessment   Current Vision Wears glasses all the time   Cognition   Overall Cognitive Status WFL   Orientation Level Oriented X4   Memory Within " functional limits   Following Commands Follows one step commands without difficulty   Assessment   Limitation Decreased ADL status;Decreased UE strength;Decreased endurance;Decreased high-level ADLs   Prognosis Good   Assessment Pt is a 58 y.o. female, admitted to [facility]/7/[facility]24 d/t experiencing extreme fatigue. Dx: sepsis. Pt with PMHx impacting their performance during ADL tasks, including:  newly diagnosed ovarian cancer, on chemotherapy . Prior to admission to the hospital Pt was performing ADLs without physical assistance. IADLs without physical assistance. Functional transfers/ambulation without physical assistance. Cognitive status was PTA was WNL. OT order placed to assess Pt's ADLs, cognitive status, and performance during functional tasks in order to maximize safety and independence while making most appropriate d/c recommendations. Pt's clinical presentation is currently evolving given new onset deficits that effect Pt's occupational performance and ability to safely return to OF including decrease activity tolerance, decrease standing balance, decrease performance during ADL tasks, decrease generalized strength, decrease activity engagement, decrease performance during functional transfers, steps to enter home, limited family support, frequent falls, and high fall risk combined with medical complications of hypertension , abnormal blood sugars, fever, decreased skin integrity, and need for input for mobility technique/safety. Personal factors affecting Pt at time of initial evaluation include: unable to perform caregiver support/tasks, step(s) to enter environment, multi-level environment, availability as recommended, inability to perform IADLs, inability to perform ADLs, inability to navigate community distances, and recent fall(s)/fall history. Pt will benefit from continued skilled OT services to address deficits as defined above and to maximize level independence/participation during ADLs and  "functional tasks to facilitate return toward PLOF and improved quality of life. From an occupational therapy standpoint, recommendation at time of d/c would be Level III (Minimum Resource Intensity).   Goals   Patient Goals \"to feel better\"   Plan   Treatment Interventions ADL retraining;Functional transfer training;UE strengthening/ROM;Endurance training;Patient/family training;Equipment evaluation/education;Continued evaluation;Energy conservation;Activityengagement;Compensatory technique education   Goal Expiration Date 10/23/24   OT Frequency 2-3x/wk   Discharge Recommendation   Rehab Resource Intensity Level, OT III (Minimum Resource Intensity)   -PAC Daily Activity Inpatient   Lower Body Dressing 3   Bathing 3   Toileting 3   Upper Body Dressing 4   Grooming 4   Eating 4   Daily Activity Raw Score 21   Daily Activity Standardized Score (Calc for Raw Score >=11) 44.27   -PAC Applied Cognition Inpatient   Following a Speech/Presentation 4   Understanding Ordinary Conversation 4   Taking Medications 4   Remembering Where Things Are Placed or Put Away 4   Remembering List of 4-5 Errands 4   Taking Care of Complicated Tasks 4   Applied Cognition Raw Score 24   Applied Cognition Standardized Score 62.21     The patient's raw score on the AM-PAC Daily Activity inpatient short form is 21, standardized score is 44.27, greater than 39.4. Patients at this level are likely to benefit from DC to home. Please refer to the recommendation of the Occupational Therapist for safe DC planning.    Pt goals to be met by 10/23:    Pt will demonstrate ability to complete grooming/hygiene tasks @ mod I after set-up.  Pt will demonstrate ability to complete supine<>sit @ mod I in order to increase safety and independence during ADL tasks.  Pt will demonstrate ability to complete UB ADLs including washing/dressing @ mod I in order to increase performance and participation during meaningful tasks  Pt will demonstrate ability to " complete LB dressing @ mod I in order to increase safety and independence during meaningful tasks.   Pt will demonstrate ability to karis/doff socks/shoes while sitting EOB @ mod I in order to increase safety and independence during meaningful tasks.   Pt will demonstrate ability to complete toileting tasks including CM and pericare @ mod I in order to increase safety and independence during meaningful tasks.  Pt will demonstrate ability to complete EOB, chair, toilet/commode transfers @ mod I in order to increase performance and participation during functional tasks.  Pt will demonstrate ability to stand for 10 minutes while maintaining good balance with use of LRAD for UB support PRN.  Pt will demonstrate ability to tolerate 30-35 minute OT session with no vc'ing for deep breathing or use of energy conservation techniques in order to increase activity tolerance during functional tasks.   Pt will demonstrate Good carryover of use of energy conservation/compensatory strategies during ADLs and functional tasks in order to increase safety and reduce risk for falls.   Pt will demonstrate Good attention and participation in continued evaluation of functional ambulation house hold distances in order to assist with safe d/c planning.  Pt will identify 3 areas of interest/hobbies and 1 intervention on how to incorporate into daily life in order to increase interaction with environment and peers as well as increase participation in meaningful tasks.   Pt will demonstrate 100% carryover of BUE HEP in order to increase BUE MS and increase performance during functional tasks upon d/c home.    At end of session pt safely seated in recliner chair with all needs in reach, chair alarm activated with tan cord plugged in. Pt reported a new burning sensation from IV sit, nursing made aware. Pt with no further questions/concerns at this time.     Jacklyn Rubio MS OTR/L

## 2024-10-09 NOTE — PLAN OF CARE
Problem: OCCUPATIONAL THERAPY ADULT  Goal: Performs self-care activities at highest level of function for planned discharge setting.  See evaluation for individualized goals.  Description: Treatment Interventions: ADL retraining, Functional transfer training, UE strengthening/ROM, Endurance training, Patient/family training, Equipment evaluation/education, Continued evaluation, Energy conservation, Activityengagement, Compensatory technique education          See flowsheet documentation for full assessment, interventions and recommendations.   Note: Limitation: Decreased ADL status, Decreased UE strength, Decreased endurance, Decreased high-level ADLs  Prognosis: Good  Assessment: Pt is a 58 y.o. female, admitted to [facility] 10/7/2024 d/t experiencing extreme fatigue. Dx: sepsis. Pt with PMHx impacting their performance during ADL tasks, including:  newly diagnosed ovarian cancer, on chemotherapy . Prior to admission to the hospital Pt was performing ADLs without physical assistance. IADLs without physical assistance. Functional transfers/ambulation without physical assistance. Cognitive status was PTA was WNL. OT order placed to assess Pt's ADLs, cognitive status, and performance during functional tasks in order to maximize safety and independence while making most appropriate d/c recommendations. Pt's clinical presentation is currently evolving given new onset deficits that effect Pt's occupational performance and ability to safely return to PLOF including decrease activity tolerance, decrease standing balance, decrease performance during ADL tasks, decrease generalized strength, decrease activity engagement, decrease performance during functional transfers, steps to enter home, limited family support, frequent falls, and high fall risk combined with medical complications of hypertension , abnormal blood sugars, fever, decreased skin integrity, and need for input for mobility technique/safety. Personal factors  affecting Pt at time of initial evaluation include: unable to perform caregiver support/tasks, step(s) to enter environment, multi-level environment, availability as recommended, inability to perform IADLs, inability to perform ADLs, inability to navigate community distances, and recent fall(s)/fall history. Pt will benefit from continued skilled OT services to address deficits as defined above and to maximize level independence/participation during ADLs and functional tasks to facilitate return toward PLOF and improved quality of life. From an occupational therapy standpoint, recommendation at time of d/c would be Level III (Minimum Resource Intensity).     Rehab Resource Intensity Level, OT: III (Minimum Resource Intensity)

## 2024-10-09 NOTE — ASSESSMENT & PLAN NOTE
The patient presented with extreme fatigue, weakness, was found to have elevated WBCs, and her Chemo-Port on left upper chest is tender, red, with some swelling.  Patient initially did vancomycin and cefepime, which transition to cefazolin per ID recommendation  Continue with maintenance IV fluid.  Blood culture showing positive.  Recommendation,-venous duplex of upper extremity left side negative.  CT chest abdomen reviewed.  TTE negative for vegetation  Per ID recommendation, IR consult placed for possible removal of port with culture-status post port removal, pending culture  Patient also having diarrhea, stool enteric panel  Stool C. difficile negative.  Per ID, continue IV antibiotic, expecting 4 to 6 weeks of antibiotic therapy.

## 2024-10-09 NOTE — CASE MANAGEMENT
Case Management Discharge Planning Note    Patient name Joce Tolbert  Location /-01 MRN [id number]  : 1965 Date 10/9/2024       Current Admission Date: 10/7/2024  Current Admission Diagnosis:Sepsis (HCC)   Patient Active Problem List    Diagnosis Date Noted Date Diagnosed    Morbid obesity (HCC) 10/08/2024     Transaminitis 10/08/2024     Metastasis to brain (HCC) 10/07/2024     Neoplastic malignant related fatigue 10/07/2024     Bacteremia due to methicillin susceptible Staphylococcus aureus (MSSA) 12/15/2021     Acute respiratory failure with hypoxia (HCC) 2021     Acute kidney injury (HCC) 2021     Hyponatremia 2021     Sepsis (HCC) 2021     Diabetes mellitus (HCC)      Hypertension      Hypercholesterolemia      Ovarian cancer (HCC)      Hypothyroidism        LOS (days): 2  Geometric Mean LOS (GMLOS) (days): 4.9  Days to GMLOS:3.1     OBJECTIVE:  Risk of Unplanned Readmission Score: 17.7         Current admission status: Inpatient   Preferred Pharmacy:   [id number] - [place] [address] [address]  Phone: [phone] Fax: [phone]    Primary Care Provider: Ninfa Baltazar MD    Primary Insurance: [employer] [facility] [employer]  Secondary Insurance:     DISCHARGE DETAILS:     Pt is interested in additional support in the home, CM provided brochure for [facility]. Pt agreeable to CM completing [facility] referral. CM provided Pt with list of private HHAs.  Pt reports she will have a ride home from family or neighbor.    CM will send referral for [facility]. CM will continue to follow for discharge planning needs.

## 2024-10-10 LAB
ALBUMIN SERPL BCG-MCNC: 3.1 G/DL (ref 3.5–5)
ALP SERPL-CCNC: 143 U/L (ref 34–104)
ALT SERPL W P-5'-P-CCNC: 35 U/L (ref 7–52)
ANION GAP SERPL CALCULATED.3IONS-SCNC: 9 MMOL/L (ref 4–13)
AST SERPL W P-5'-P-CCNC: 29 U/L (ref 13–39)
BACTERIA BLD CULT: ABNORMAL
BACTERIA BLD CULT: ABNORMAL
BASOPHILS # BLD AUTO: 0.01 THOUSANDS/ΜL (ref 0–0.1)
BASOPHILS NFR BLD AUTO: 0 % (ref 0–1)
BILIRUB SERPL-MCNC: 0.43 MG/DL (ref 0.2–1)
BUN SERPL-MCNC: 19 MG/DL (ref 5–25)
C COLI+JEJUNI TUF STL QL NAA+PROBE: NEGATIVE
CALCIUM ALBUM COR SERPL-MCNC: 9.7 MG/DL (ref 8.3–10.1)
CALCIUM SERPL-MCNC: 9 MG/DL (ref 8.4–10.2)
CHLORIDE SERPL-SCNC: 109 MMOL/L (ref 96–108)
CO2 SERPL-SCNC: 25 MMOL/L (ref 21–32)
CREAT SERPL-MCNC: 1.27 MG/DL (ref 0.6–1.3)
EC STX1+STX2 GENES STL QL NAA+PROBE: NEGATIVE
EOSINOPHIL # BLD AUTO: 0 THOUSAND/ΜL (ref 0–0.61)
EOSINOPHIL NFR BLD AUTO: 0 % (ref 0–6)
ERYTHROCYTE [DISTWIDTH] IN BLOOD BY AUTOMATED COUNT: 15.7 % (ref 11.6–15.1)
GFR SERPL CREATININE-BSD FRML MDRD: 45 ML/MIN/1.73SQ M
GLUCOSE SERPL-MCNC: 172 MG/DL (ref 65–140)
GLUCOSE SERPL-MCNC: 173 MG/DL (ref 65–140)
GRAM STN SPEC: ABNORMAL
GRAM STN SPEC: ABNORMAL
HCT VFR BLD AUTO: 32.2 % (ref 34.8–46.1)
HGB BLD-MCNC: 10.6 G/DL (ref 11.5–15.4)
IMM GRANULOCYTES # BLD AUTO: 0.19 THOUSAND/UL (ref 0–0.2)
IMM GRANULOCYTES NFR BLD AUTO: 2 % (ref 0–2)
LYMPHOCYTES # BLD AUTO: 1.24 THOUSANDS/ΜL (ref 0.6–4.47)
LYMPHOCYTES NFR BLD AUTO: 10 % (ref 14–44)
MCH RBC QN AUTO: 28 PG (ref 26.8–34.3)
MCHC RBC AUTO-ENTMCNC: 32.9 G/DL (ref 31.4–37.4)
MCV RBC AUTO: 85 FL (ref 82–98)
MONOCYTES # BLD AUTO: 0.36 THOUSAND/ΜL (ref 0.17–1.22)
MONOCYTES NFR BLD AUTO: 3 % (ref 4–12)
NEUTROPHILS # BLD AUTO: 10.33 THOUSANDS/ΜL (ref 1.85–7.62)
NEUTS SEG NFR BLD AUTO: 85 % (ref 43–75)
NRBC BLD AUTO-RTO: 0 /100 WBCS
PLATELET # BLD AUTO: 290 THOUSANDS/UL (ref 149–390)
PMV BLD AUTO: 9.9 FL (ref 8.9–12.7)
POTASSIUM SERPL-SCNC: 3.8 MMOL/L (ref 3.5–5.3)
PROT SERPL-MCNC: 6.7 G/DL (ref 6.4–8.4)
RBC # BLD AUTO: 3.78 MILLION/UL (ref 3.81–5.12)
SALMONELLA SP SPAO STL QL NAA+PROBE: NEGATIVE
SHIGELLA SP+EIEC IPAH STL QL NAA+PROBE: NEGATIVE
SODIUM SERPL-SCNC: 143 MMOL/L (ref 135–147)
WBC # BLD AUTO: 12.13 THOUSAND/UL (ref 4.31–10.16)

## 2024-10-10 PROCEDURE — 99232 SBSQ HOSP IP/OBS MODERATE 35: CPT | Performed by: FAMILY MEDICINE

## 2024-10-10 PROCEDURE — 82948 REAGENT STRIP/BLOOD GLUCOSE: CPT

## 2024-10-10 PROCEDURE — 85025 COMPLETE CBC W/AUTO DIFF WBC: CPT | Performed by: FAMILY MEDICINE

## 2024-10-10 PROCEDURE — 99233 SBSQ HOSP IP/OBS HIGH 50: CPT | Performed by: INTERNAL MEDICINE

## 2024-10-10 PROCEDURE — 80053 COMPREHEN METABOLIC PANEL: CPT | Performed by: FAMILY MEDICINE

## 2024-10-10 RX ADMIN — CEFAZOLIN SODIUM 2000 MG: 2 SOLUTION INTRAVENOUS at 04:19

## 2024-10-10 RX ADMIN — LEVOTHYROXINE SODIUM 88 MCG: 88 TABLET ORAL at 05:57

## 2024-10-10 RX ADMIN — DEXAMETHASONE SODIUM PHOSPHATE 4 MG: 4 INJECTION INTRA-ARTICULAR; INTRALESIONAL; INTRAMUSCULAR; INTRAVENOUS; SOFT TISSUE at 12:13

## 2024-10-10 RX ADMIN — PYRIDOXINE HCL TAB 50 MG 100 MG: 50 TAB at 09:21

## 2024-10-10 RX ADMIN — DEXAMETHASONE SODIUM PHOSPHATE 4 MG: 4 INJECTION INTRA-ARTICULAR; INTRALESIONAL; INTRAMUSCULAR; INTRAVENOUS; SOFT TISSUE at 05:57

## 2024-10-10 RX ADMIN — HEPARIN SODIUM 5000 UNITS: 5000 INJECTION, SOLUTION INTRAVENOUS; SUBCUTANEOUS at 05:57

## 2024-10-10 RX ADMIN — GABAPENTIN 300 MG: 300 CAPSULE ORAL at 18:02

## 2024-10-10 RX ADMIN — DEXAMETHASONE SODIUM PHOSPHATE 4 MG: 4 INJECTION INTRA-ARTICULAR; INTRALESIONAL; INTRAMUSCULAR; INTRAVENOUS; SOFT TISSUE at 18:02

## 2024-10-10 RX ADMIN — GABAPENTIN 300 MG: 300 CAPSULE ORAL at 09:21

## 2024-10-10 RX ADMIN — CEFAZOLIN SODIUM 2000 MG: 2 SOLUTION INTRAVENOUS at 14:22

## 2024-10-10 RX ADMIN — HEPARIN SODIUM 5000 UNITS: 5000 INJECTION, SOLUTION INTRAVENOUS; SUBCUTANEOUS at 21:32

## 2024-10-10 RX ADMIN — CEFAZOLIN SODIUM 2000 MG: 2 SOLUTION INTRAVENOUS at 20:21

## 2024-10-10 RX ADMIN — DEXAMETHASONE SODIUM PHOSPHATE 4 MG: 4 INJECTION INTRA-ARTICULAR; INTRALESIONAL; INTRAMUSCULAR; INTRAVENOUS; SOFT TISSUE at 00:30

## 2024-10-10 RX ADMIN — CEFAZOLIN SODIUM 2000 MG: 2 SOLUTION INTRAVENOUS at 09:22

## 2024-10-10 RX ADMIN — HEPARIN SODIUM 5000 UNITS: 5000 INJECTION, SOLUTION INTRAVENOUS; SUBCUTANEOUS at 14:22

## 2024-10-10 NOTE — ASSESSMENT & PLAN NOTE
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  At this time, no acute neurological symptoms.  Dexamethasone and antiseizure medication can be started if deemed to be necessary.  Discussed with neurosurgery-May use Decadron, avoid antiepileptic at this time.  Also recommending to consult with radiation oncology (which is not available in this campus-we will try to reach out to patient on oncology, Dr. Gonzalez in a.m.)-try to reach Dr. Gonzalez's office, the attending is off today, I will try to reach you tomorrow  After discussion has done with ID regarding neurosurgery recommendation about and Decadron, since patient already on IV antibiotic for 48 hours, can resume Decadron 4 vasogenic edema.  Patient on hematology/oncologist updated over the phone

## 2024-10-10 NOTE — CASE MANAGEMENT
Case Management Discharge Planning Note    Patient name Joce Tolbert  Location /-01 MRN [id number]  : 1965 Date 10/10/2024       Current Admission Date: 10/7/2024  Current Admission Diagnosis:Sepsis (HCC)   Patient Active Problem List    Diagnosis Date Noted Date Diagnosed    Morbid obesity (HCC) 10/08/2024     Transaminitis 10/08/2024     Metastasis to brain (HCC) 10/07/2024     Neoplastic malignant related fatigue 10/07/2024     Bacteremia due to methicillin susceptible Staphylococcus aureus (MSSA) 12/15/2021     Acute respiratory failure with hypoxia (HCC) 2021     Acute kidney injury (HCC) 2021     Hyponatremia 2021     Sepsis (HCC) 2021     Diabetes mellitus (HCC)      Hypertension      Hypercholesterolemia      Ovarian cancer (HCC)      Hypothyroidism        LOS (days): 3  Geometric Mean LOS (GMLOS) (days): 4.9  Days to GMLOS:2.2     OBJECTIVE:  Risk of Unplanned Readmission Score: 18.12         Current admission status: Inpatient   Preferred Pharmacy:   [id number]  [place] [address] [address]  Phone: [phone] Fax: [phone]    Primary Care Provider: Ninfa Baltazar MD    Primary Insurance: [employer] [facility]  Secondary Insurance:     DISCHARGE DETAILS:     CM reviewed HHC referral in [facility] - the following agencies did not respond yet:   [facility]  [facility]  [facility]  [facility]    The following agencies said no:  [facility] - Lack of equipment resources  [facility] - Out of our service area  [facility] - Staff shortage  [facility] - Payor not accepted at this facility  [facility] - Declining due to limited capacity, thanks  [facility]  -OUTSIDE OF SERVICE AREA  [facility] - Unable to accommodate.  [facility] - cannot accommodate patient's need    [facility] accepted.     CM confirmed patient 100% covered via [employer] - reserved in Aidin. Will coordinated discharge tomorrow.    CM to follow patient's care and discharge needs.

## 2024-10-10 NOTE — PROGRESS NOTES
Progress Note - Hospitalist   Name: Joce Tolbert 58 y.o. female I MRN: [id number]  Unit/Bed#: -01 I Date of Admission: 10/7/2024   Date of Service: 10/10/2024 I Hospital Day: 3    Assessment & Plan  Sepsis (HCC)  The patient presented with extreme fatigue, weakness, was found to have elevated WBCs, and her Chemo-Port on left upper chest is tender, red, with some swelling.  Patient initially did vancomycin and cefepime, which transition to cefazolin per ID recommendation  Continue with maintenance IV fluid.  Blood culture showing positive.  Recommendation,-venous duplex of upper extremity left side negative.  CT chest abdomen reviewed.  TTE negative for vegetation  Per ID recommendation, IR consult placed for possible removal of port with culture-status post port removal, pending culture  Patient also having diarrhea, stool enteric panel  Stool C. difficile negative.  Per ID, continue IV antibiotic, expecting 4 to 6 weeks of antibiotic therapy.  Will continue current treatment, catheter tip culture is showing Staph aureus similar to blood culture.  Follow repeat blood culture, so far no growth in 24 hours  Ovarian cancer (HCC)  The patient will continue with outpatient scheduled chemotherapy with her oncologist.  Metastasis to brain (HCC)  The patient was aware that she has a small mass with vasogenic edema on the right frontal lobe.  At this time, no acute neurological symptoms.  Dexamethasone and antiseizure medication can be started if deemed to be necessary.  Discussed with neurosurgery-May use Decadron, avoid antiepileptic at this time.  Also recommending to consult with radiation oncology (which is not available in this campus-we will try to reach out to patient on oncology, Dr. Gonzalez in a.m.)-try to reach Dr. Gonzalez's office, the attending is off today, I will try to reach you tomorrow  After discussion has done with ID regarding neurosurgery recommendation about and Decadron, since patient  already on IV antibiotic for 48 hours, can resume Decadron 4 vasogenic edema.  Patient on hematology/oncologist updated over the phone    Neoplastic malignant related fatigue  Continue the fluid resuscitation and to treat underlying cause of fatigue which is most likely cancer and ongoing chemotherapy as well as possible sepsis.  Acute kidney injury (HCC)  Creatinine was 1.3 back in September 1724  Patient presented to ER with creatinine 1.93  -Patient is getting chemotherapy for ovarian cancer, also developed sepsis could be the cause  Continue IV hydration, retention protocol  Patient having diarrhea-check C. difficile, stool enteric panel  Bacteremia due to methicillin susceptible Staphylococcus aureus (MSSA)  As documented in sepsis section  Morbid obesity (HCC)  Lifestyle modification    VTE Pharmacologic Prophylaxis: VTE Score: 8 High Risk (Score >/= 5) - Pharmacological DVT Prophylaxis Ordered: heparin. Sequential Compression Devices Ordered.    Mobility:   Basic Mobility Inpatient Raw Score: 18  JH-HLM Goal: 6: Walk 10 steps or more  JH-HLM Achieved: 8: Walk 250 feet ot more  JH-HLM Goal achieved. Continue to encourage appropriate mobility.    Patient Centered Rounds: I performed bedside rounds with nursing staff today.   Discussions with Specialists or Other Care Team Provider: Infectious disease    Education and Discussions with Family / Patient: Updated  (daughter) at bedside.    Current Length of Stay: 3 day(s)  Current Patient Status: Inpatient   Certification Statement: The patient will continue to require additional inpatient hospital stay due to monitorable conditions  Discharge Plan: Anticipate discharge in 48-72 hrs to to be determined    Code Status: Level 1 - Full Code    Subjective   Seen and evaluated during the run.  Resting comfortably.  Denies any significant complaint other than feels much better.    Objective :  Temp:  [97.3 °F (36.3 °C)-97.8 °F (36.6 °C)] 97.3 °F (36.3  °C)  HR:  [73-95] 82  BP: (101-141)/(51-83) 105/66  Resp:  [17-27] 17  SpO2:  [90 %-96 %] 96 %  O2 Device: None (Room air)  Nasal Cannula O2 Flow Rate (L/min):  [4 L/min] 4 L/min    Body mass index is 49.09 kg/m².     Input and Output Summary (last 24 hours):     Intake/Output Summary (Last 24 hours) at 10/10/2024 0933  Last data filed at 10/9/2024 1704  Gross per 24 hour   Intake 630 ml   Output --   Net 630 ml       Physical Exam  Vitals and nursing note reviewed.   Constitutional:       Appearance: She is obese. She is not ill-appearing or diaphoretic.   HENT:      Mouth/Throat:      Mouth: Mucous membranes are moist.      Pharynx: No oropharyngeal exudate or posterior oropharyngeal erythema.   Eyes:      General: No scleral icterus.        Right eye: No discharge.         Left eye: No discharge.      Conjunctiva/sclera: Conjunctivae normal.      Pupils: Pupils are equal, round, and reactive to light.   Cardiovascular:      Rate and Rhythm: Normal rate.      Heart sounds: No murmur heard.     No friction rub. No gallop.   Pulmonary:      Effort: Pulmonary effort is normal. No respiratory distress.      Breath sounds: No stridor. No wheezing or rhonchi.   Abdominal:      General: There is no distension.      Palpations: There is no mass.      Tenderness: There is no abdominal tenderness.      Hernia: No hernia is present.   Skin:     Findings: No lesion or rash.   Neurological:      Mental Status: She is alert and oriented to person, place, and time.      Cranial Nerves: No cranial nerve deficit.      Sensory: No sensory deficit.      Motor: No weakness.      Coordination: Coordination normal.         Lines/Drains:      Lab Results: I have reviewed the following results:   Results from last 7 days   Lab Units 10/10/24  0409   WBC Thousand/uL 12.13*   HEMOGLOBIN g/dL 10.6*   HEMATOCRIT % 32.2*   PLATELETS Thousands/uL 290   SEGS PCT % 85*   LYMPHO PCT % 10*   MONO PCT % 3*   EOS PCT % 0     Results from last 7 days    Lab Units 10/10/24  0409   SODIUM mmol/L 143   POTASSIUM mmol/L 3.8   CHLORIDE mmol/L 109*   CO2 mmol/L 25   BUN mg/dL 19   CREATININE mg/dL 1.27   ANION GAP mmol/L 9   CALCIUM mg/dL 9.0   ALBUMIN g/dL 3.1*   TOTAL BILIRUBIN mg/dL 0.43   ALK PHOS U/L 143*   ALT U/L 35   AST U/L 29   GLUCOSE RANDOM mg/dL 173*         Results from last 7 days   Lab Units 10/10/24  0034   POC GLUCOSE mg/dl 172*         Results from last 7 days   Lab Units 10/07/24  0930   LACTIC ACID mmol/L 1.1   PROCALCITONIN ng/ml 1.26*       Recent Cultures (last 7 days):   Results from last 7 days   Lab Units 10/09/24  0722 10/08/24  1649 10/07/24  0957 10/07/24  0930   BLOOD CULTURE  Received in Microbiology Lab. Culture in Progress.  Received in Microbiology Lab. Culture in Progress.  --  Staphylococcus aureus* Staphylococcus aureus*   GRAM STAIN RESULT   --   --  Gram positive cocci in clusters* Gram positive cocci in clusters*   C DIFF TOXIN B BY PCR   --  Negative  --   --        Imaging Results Review: No pertinent imaging studies reviewed.  Other Study Results Review: No additional pertinent studies reviewed.    Last 24 Hours Medication List:     Current Facility-Administered Medications:     acetaminophen (TYLENOL) tablet 975 mg, Q6H PRN    ceFAZolin (ANCEF) IVPB (premix in dextrose) 2,000 mg 50 mL, Q6H, Last Rate: 2,000 mg (10/10/24 0922)    dexamethasone (DECADRON) injection 4 mg, Q6H NERY    gabapentin (NEURONTIN) capsule 300 mg, BID    heparin (porcine) subcutaneous injection 5,000 Units, Q8H NERY    HYDROmorphone (DILAUDID) injection 0.5 mg, Q2H PRN    levothyroxine tablet 88 mcg, Early Morning    metoprolol succinate (TOPROL-XL) 24 hr tablet 50 mg, Q12H NERY    ondansetron (ZOFRAN) injection 4 mg, Q6H PRN    pyridoxine (VITAMIN B6) tablet 100 mg, Daily    tiZANidine (ZANAFLEX) tablet 4 mg, Q6H PRN    Administrative Statements   Today, Patient Was Seen By: Nemo Bajwa MD      **Please Note: This note may have been constructed  using a voice recognition system.**

## 2024-10-10 NOTE — ASSESSMENT & PLAN NOTE
Leukocytosis, tachycardia, tachypnea. Due to MSSA bacteremia, port infection. Hemodynamically stable, afebrile with improving leukocytosis     Continue supportive care   Check daily CBC, CMP while inpatient to monitor for any evolving antibiotic toxicity or treatment failure   Antibiotics and additional management as below   Monitor stool output with new diarrhea but stool for c diff testing is negative

## 2024-10-10 NOTE — CASE MANAGEMENT
Case Management Discharge Planning Note    Patient name Joce Tolbert  Location /-01 MRN [id number]  : 1965 Date 10/10/2024       Current Admission Date: 10/7/2024  Current Admission Diagnosis:Sepsis (HCC)   Patient Active Problem List    Diagnosis Date Noted Date Diagnosed    Morbid obesity (HCC) 10/08/2024     Transaminitis 10/08/2024     Metastasis to brain (HCC) 10/07/2024     Neoplastic malignant related fatigue 10/07/2024     Bacteremia due to methicillin susceptible Staphylococcus aureus (MSSA) 12/15/2021     Acute respiratory failure with hypoxia (HCC) 2021     Acute kidney injury (HCC) 2021     Hyponatremia 2021     Sepsis (HCC) 2021     Diabetes mellitus (HCC)      Hypertension      Hypercholesterolemia      Ovarian cancer (HCC)      Hypothyroidism        LOS (days): 3  Geometric Mean LOS (GMLOS) (days): 4.9  Days to GMLOS:2.2     OBJECTIVE:  Risk of Unplanned Readmission Score: 18.12         Current admission status: Inpatient   [facility]   [id number] [address] [address]  Phone: [phone] Fax: [phone]    Primary Care Provider: Ninfa Baltazar MD    [facility]  Secondary Insurance:     DISCHARGE DETAILS:        Pt will be discharging with IV Abx. CM placed blanket referrals in Aidin for HHC. CM will review choices with Pt.     2:50pm Update: CM reviewed IV Abx with Pt. Pt states she feels comfortable going home with IV Abx and is agreeable to HHC with [facility]. Pts daughter also available to help with [facility]x. [facility]     CM sent Aidin referral to [employer] to start price checking. Pt agreeable to use of [facility] pharmacy.     CM will continue to follow for discharge planning needs.

## 2024-10-10 NOTE — ASSESSMENT & PLAN NOTE
S/p chemo, LEIDA-BSO in 2021. Now with recurrent disease, retroperitoneal lymphadenopathy. Port placed 9/16, received first treatment of paclitaxel/carboplatin on 9/24. Concern for port site infection and brain mets as noted above     Additional mngt per oncology, continue steroids per neurosurgery

## 2024-10-10 NOTE — PROGRESS NOTES
Patient:    MRN:  [id number]    Aidin Request ID:  [id number]    Level of care reserved:  Infusion    Partner Reserved:  [facility] [place] [address] [phone]    Clinical needs requested:    Geography searched:  [phone]    Start of Service:    Request sent:  3:18pm EDT on 10/10/2024 by Henrik Ward    Partner reserved:  4:06pm EDT on 10/10/2024 by Iqra Moore    Choice list shared:

## 2024-10-10 NOTE — ASSESSMENT & PLAN NOTE
Acute mild elevation in ALT, AST and ALP. Possibly secondary to sepsis and/or chemotherapy related. Also has underlying hepatic steatosis. Resolving     Trend LFTs

## 2024-10-10 NOTE — ASSESSMENT & PLAN NOTE
Both sets of blood cx from admission with MSSA.   Suspect catheter associated bacteremia given local signs of infection (redness, swelling) along Left port site. S/p removal on 10/9, no purulence noted. Rule out endovascular infection but TTE is without vegetation. No other indwelling vascular or prosthetic devices. Duplex without acute DVT, has chronic RIJ thrombus.   CT chest/abd without septic pulmonary emboli or other infectious focus.   Has a newly detected R frontal lobe enhancing mass but on MRI appears more consistent with mets rather than septic cerebral emboli.      Continue cefazolin 2 q6h   FU repeat blood cx and cath tip cx   Recommend KATINA if blood cx fail to clear    Final duration of antibiotics to be determined, anticipate 4-6 weeks of therapy and consult IR for venous access for iv abx once repeat blood cx are negative for 48 hrs. Will need to retain catheter for chemotherapy

## 2024-10-10 NOTE — ASSESSMENT & PLAN NOTE
The patient presented with extreme fatigue, weakness, was found to have elevated WBCs, and her Chemo-Port on left upper chest is tender, red, with some swelling.  Patient initially did vancomycin and cefepime, which transition to cefazolin per ID recommendation  Continue with maintenance IV fluid.  Blood culture showing positive.  Recommendation,-venous duplex of upper extremity left side negative.  CT chest abdomen reviewed.  TTE negative for vegetation  Per ID recommendation, IR consult placed for possible removal of port with culture-status post port removal, pending culture  Patient also having diarrhea, stool enteric panel  Stool C. difficile negative.  Per ID, continue IV antibiotic, expecting 4 to 6 weeks of antibiotic therapy.  Will continue current treatment, catheter tip culture is showing Staph aureus similar to blood culture.  Follow repeat blood culture, so far no growth in 24 hours

## 2024-10-10 NOTE — PROGRESS NOTES
Patient:    MRN:  [id number]    Aidin Request ID:  [id number]    Level of care reserved:  Home Health Agency    Partner Reserved:  [facility] [place] [address] [phone]    Clinical needs requested:  physical therapy, occupational therapy, skilled nursing    Geography searched:  [phone]    Start of Service:    Request sent:  12:14pm EDT on 10/10/2024 by Iqra Moore    Partner reserved:  3:14pm EDT on 10/10/2024 by Henrik Ward    Choice list shared:  3:14pm EDT on 10/10/2024 by Henrik Ward

## 2024-10-10 NOTE — NURSING NOTE
Patient IV site that was placed on 10/9/24 at 2200 no longer working. IV occluded. Supervisor called for ultrasound guided IV. ICU RN at bedside to place.

## 2024-10-10 NOTE — PLAN OF CARE
Problem: PAIN - ADULT  Goal: Verbalizes/displays adequate comfort level or baseline comfort level  Description: Interventions:  - Encourage patient to monitor pain and request assistance  - Assess pain using appropriate pain scale  - Administer analgesics based on type and severity of pain and evaluate response  - Implement non-pharmacological measures as appropriate and evaluate response  - Consider cultural and social influences on pain and pain management  - Notify physician/advanced practitioner if interventions unsuccessful or patient reports new pain  Outcome: Progressing     Problem: INFECTION - ADULT  Goal: Absence or prevention of progression during hospitalization  Description: INTERVENTIONS:  - Assess and monitor for signs and symptoms of infection  - Monitor lab/diagnostic results  - Monitor all insertion sites, i.e. indwelling lines, tubes, and drains  - Monitor endotracheal if appropriate and nasal secretions for changes in amount and color  - [place] appropriate cooling/warming therapies per order  - Administer medications as ordered  - Instruct and encourage patient and family to use good hand hygiene technique  - Identify and instruct in appropriate isolation precautions for identified infection/condition  Outcome: Progressing     Problem: SAFETY ADULT  Goal: Patient will remain free of falls  Description: INTERVENTIONS:  - Educate patient/family on patient safety including physical limitations  - Instruct patient to call for assistance with activity   - Consult OT/PT to assist with strengthening/mobility   - Keep Call bell within reach  - Keep bed low and locked with side rails adjusted as appropriate  - Keep care items and personal belongings within reach  - Initiate and maintain comfort rounds  - Make Fall Risk Sign visible to staff  - Offer Toileting every 2 Hours, in advance of need  - Initiate/Maintain bedalarm  - Obtain necessary fall risk management equipment: socks  - Apply yellow socks  and bracelet for high fall risk patients  - Consider moving patient to room near nurses station  Outcome: Progressing  Goal: Maintain or return to baseline ADL function  Description: INTERVENTIONS:  -  Assess patient's ability to carry out ADLs; assess patient's baseline for ADL function and identify physical deficits which impact ability to perform ADLs (bathing, care of mouth/teeth, toileting, grooming, dressing, etc.)  - Assess/evaluate cause of self-care deficits   - Assess range of motion  - Assess patient's mobility; develop plan if impaired  - Assess patient's need for assistive devices and provide as appropriate  - Encourage maximum independence but intervene and supervise when necessary  - Involve family in performance of ADLs  - Assess for home care needs following discharge   - Consider OT consult to assist with ADL evaluation and planning for discharge  - Provide patient education as appropriate  Outcome: Progressing  Goal: Maintains/Returns to pre admission functional level  Description: INTERVENTIONS:  - Perform AM-PAC 6 Click Basic Mobility/ Daily Activity assessment daily.  - Set and communicate daily mobility goal to care team and patient/family/caregiver.   - Collaborate with rehabilitation services on mobility goals if consulted  - Perform Range of Motion 2 times a day.  - Reposition patient every 2 hours.  - Dangle patient 2 times a day  - Stand patient 2 times a day  - Ambulate patient 2 times a day  - Out of bed to chair 2 times a day   - Out of bed for meals 2 times a day  - Out of bed for toileting  - Record patient progress and toleration of activity level   Outcome: Progressing     Problem: DISCHARGE PLANNING  Goal: Discharge to home or other facility with appropriate resources  Description: INTERVENTIONS:  - Identify barriers to discharge w/patient and caregiver  - Arrange for needed discharge resources and transportation as appropriate  - Identify discharge learning needs (meds, wound  care, etc.)  - Arrange for interpretive services to assist at discharge as needed  - Refer to Case Management Department for coordinating discharge planning if the patient needs post-hospital services based on physician/advanced practitioner order or complex needs related to functional status, cognitive ability, or social support system  Outcome: Progressing     Problem: Knowledge Deficit  Goal: Patient/family/caregiver demonstrates understanding of disease process, treatment plan, medications, and discharge instructions  Description: Complete learning assessment and assess knowledge base.  Interventions:  - Provide teaching at level of understanding  - Provide teaching via preferred learning methods  Outcome: Progressing

## 2024-10-11 LAB
ALBUMIN SERPL BCG-MCNC: 3.1 G/DL (ref 3.5–5)
ALP SERPL-CCNC: 122 U/L (ref 34–104)
ALT SERPL W P-5'-P-CCNC: 16 U/L (ref 7–52)
ANION GAP SERPL CALCULATED.3IONS-SCNC: 9 MMOL/L (ref 4–13)
AST SERPL W P-5'-P-CCNC: 21 U/L (ref 13–39)
BACTERIA CATH TIP CULT: ABNORMAL
BASOPHILS # BLD AUTO: 0.02 THOUSANDS/ΜL (ref 0–0.1)
BASOPHILS NFR BLD AUTO: 0 % (ref 0–1)
BILIRUB SERPL-MCNC: 0.38 MG/DL (ref 0.2–1)
BUN SERPL-MCNC: 21 MG/DL (ref 5–25)
CALCIUM ALBUM COR SERPL-MCNC: 9.8 MG/DL (ref 8.3–10.1)
CALCIUM SERPL-MCNC: 9.1 MG/DL (ref 8.4–10.2)
CHLORIDE SERPL-SCNC: 108 MMOL/L (ref 96–108)
CO2 SERPL-SCNC: 25 MMOL/L (ref 21–32)
CREAT SERPL-MCNC: 1.18 MG/DL (ref 0.6–1.3)
EOSINOPHIL # BLD AUTO: 0 THOUSAND/ΜL (ref 0–0.61)
EOSINOPHIL NFR BLD AUTO: 0 % (ref 0–6)
ERYTHROCYTE [DISTWIDTH] IN BLOOD BY AUTOMATED COUNT: 15.5 % (ref 11.6–15.1)
GFR SERPL CREATININE-BSD FRML MDRD: 50 ML/MIN/1.73SQ M
GLUCOSE SERPL-MCNC: 170 MG/DL (ref 65–140)
HCT VFR BLD AUTO: 32.2 % (ref 34.8–46.1)
HGB BLD-MCNC: 10.7 G/DL (ref 11.5–15.4)
IMM GRANULOCYTES # BLD AUTO: 0.33 THOUSAND/UL (ref 0–0.2)
IMM GRANULOCYTES NFR BLD AUTO: 2 % (ref 0–2)
LYMPHOCYTES # BLD AUTO: 1.51 THOUSANDS/ΜL (ref 0.6–4.47)
LYMPHOCYTES NFR BLD AUTO: 11 % (ref 14–44)
MCH RBC QN AUTO: 28.2 PG (ref 26.8–34.3)
MCHC RBC AUTO-ENTMCNC: 33.2 G/DL (ref 31.4–37.4)
MCV RBC AUTO: 85 FL (ref 82–98)
MONOCYTES # BLD AUTO: 0.48 THOUSAND/ΜL (ref 0.17–1.22)
MONOCYTES NFR BLD AUTO: 4 % (ref 4–12)
NEUTROPHILS # BLD AUTO: 11.29 THOUSANDS/ΜL (ref 1.85–7.62)
NEUTS SEG NFR BLD AUTO: 83 % (ref 43–75)
NRBC BLD AUTO-RTO: 0 /100 WBCS
PLATELET # BLD AUTO: 386 THOUSANDS/UL (ref 149–390)
PMV BLD AUTO: 9.6 FL (ref 8.9–12.7)
POTASSIUM SERPL-SCNC: 3.7 MMOL/L (ref 3.5–5.3)
PROT SERPL-MCNC: 6.3 G/DL (ref 6.4–8.4)
RBC # BLD AUTO: 3.79 MILLION/UL (ref 3.81–5.12)
SODIUM SERPL-SCNC: 142 MMOL/L (ref 135–147)
WBC # BLD AUTO: 13.63 THOUSAND/UL (ref 4.31–10.16)

## 2024-10-11 PROCEDURE — 80053 COMPREHEN METABOLIC PANEL: CPT | Performed by: FAMILY MEDICINE

## 2024-10-11 PROCEDURE — 97110 THERAPEUTIC EXERCISES: CPT

## 2024-10-11 PROCEDURE — 99233 SBSQ HOSP IP/OBS HIGH 50: CPT | Performed by: FAMILY MEDICINE

## 2024-10-11 PROCEDURE — 97112 NEUROMUSCULAR REEDUCATION: CPT

## 2024-10-11 PROCEDURE — 99233 SBSQ HOSP IP/OBS HIGH 50: CPT | Performed by: INTERNAL MEDICINE

## 2024-10-11 PROCEDURE — 97530 THERAPEUTIC ACTIVITIES: CPT

## 2024-10-11 PROCEDURE — 85025 COMPLETE CBC W/AUTO DIFF WBC: CPT | Performed by: FAMILY MEDICINE

## 2024-10-11 PROCEDURE — 97116 GAIT TRAINING THERAPY: CPT

## 2024-10-11 RX ORDER — CEFAZOLIN SODIUM 2 G/50ML
2000 SOLUTION INTRAVENOUS EVERY 6 HOURS
Qty: 5000 ML | Refills: 0 | Status: SHIPPED | OUTPATIENT
Start: 2024-10-11 | End: 2024-11-05

## 2024-10-11 RX ADMIN — HEPARIN SODIUM 5000 UNITS: 5000 INJECTION, SOLUTION INTRAVENOUS; SUBCUTANEOUS at 05:12

## 2024-10-11 RX ADMIN — GABAPENTIN 300 MG: 300 CAPSULE ORAL at 09:22

## 2024-10-11 RX ADMIN — PYRIDOXINE HCL TAB 50 MG 100 MG: 50 TAB at 09:22

## 2024-10-11 RX ADMIN — HEPARIN SODIUM 5000 UNITS: 5000 INJECTION, SOLUTION INTRAVENOUS; SUBCUTANEOUS at 15:20

## 2024-10-11 RX ADMIN — DEXAMETHASONE SODIUM PHOSPHATE 4 MG: 4 INJECTION INTRA-ARTICULAR; INTRALESIONAL; INTRAMUSCULAR; INTRAVENOUS; SOFT TISSUE at 12:05

## 2024-10-11 RX ADMIN — HEPARIN SODIUM 5000 UNITS: 5000 INJECTION, SOLUTION INTRAVENOUS; SUBCUTANEOUS at 20:37

## 2024-10-11 RX ADMIN — GABAPENTIN 300 MG: 300 CAPSULE ORAL at 17:37

## 2024-10-11 RX ADMIN — DEXAMETHASONE SODIUM PHOSPHATE 4 MG: 4 INJECTION INTRA-ARTICULAR; INTRALESIONAL; INTRAMUSCULAR; INTRAVENOUS; SOFT TISSUE at 17:37

## 2024-10-11 RX ADMIN — DEXAMETHASONE SODIUM PHOSPHATE 4 MG: 4 INJECTION INTRA-ARTICULAR; INTRALESIONAL; INTRAMUSCULAR; INTRAVENOUS; SOFT TISSUE at 00:09

## 2024-10-11 RX ADMIN — CEFAZOLIN SODIUM 2000 MG: 2 SOLUTION INTRAVENOUS at 15:21

## 2024-10-11 RX ADMIN — LEVOTHYROXINE SODIUM 88 MCG: 88 TABLET ORAL at 05:12

## 2024-10-11 RX ADMIN — METOPROLOL SUCCINATE 50 MG: 50 TABLET, EXTENDED RELEASE ORAL at 20:37

## 2024-10-11 RX ADMIN — DEXAMETHASONE SODIUM PHOSPHATE 4 MG: 4 INJECTION INTRA-ARTICULAR; INTRALESIONAL; INTRAMUSCULAR; INTRAVENOUS; SOFT TISSUE at 05:12

## 2024-10-11 RX ADMIN — CEFAZOLIN SODIUM 2000 MG: 2 SOLUTION INTRAVENOUS at 20:37

## 2024-10-11 RX ADMIN — METOPROLOL SUCCINATE 50 MG: 50 TABLET, EXTENDED RELEASE ORAL at 09:22

## 2024-10-11 RX ADMIN — CEFAZOLIN SODIUM 2000 MG: 2 SOLUTION INTRAVENOUS at 02:38

## 2024-10-11 RX ADMIN — CEFAZOLIN SODIUM 2000 MG: 2 SOLUTION INTRAVENOUS at 09:22

## 2024-10-11 NOTE — ASSESSMENT & PLAN NOTE
The patient presented with extreme fatigue, weakness, was found to have elevated WBCs, and her Chemo-Port on left upper chest is tender, red, with some swelling.  Patient initially did vancomycin and cefepime, which transition to cefazolin per ID recommendation  Blood culture showing positive-Staph aureus.  So dialysis catheter tip  Recommendation,-venous duplex of upper extremity left side negative.  CT chest abdomen reviewed.  TTE negative for vegetation  C. difficile, stool enteric panel negative  Per ID, continue IV antibiotic, expecting 4 weeks cefazolin 2 g every 6 hours till 11/5/2024  Repeat blood culture so far no growth, per ID recommendation, wait for 48 hours, if culture negative, can consult with IR for central line placement for IV antibiotic and chemo

## 2024-10-11 NOTE — PROGRESS NOTES
Progress Note - Infectious Disease   Name: Joce Tolbert 58 y.o. female I MRN: [id number]  Unit/Bed#: -01 I Date of Admission: 10/7/2024   Date of Service: 10/11/2024 I Hospital Day: 4         VIRTUAL CARE DOCUMENTATION:     1. This service was provided via Telemedicine using [employer]     2. Parties in the room with patient during teleconsult Patient only    3. Confidentiality My office door was closed     4. Participants No one else was in the room    5. Patient acknowledged consent and understanding of privacy and security of the  Telemedicine consult. I informed the patient that I have reviewed their record in Epic and presented the opportunity for them to ask any questions regarding the visit today.  The patient agreed to participate.    6. Time spent 5 minutes     Assessment & Plan  Sepsis (HCC)  Leukocytosis, tachycardia, tachypnea. Due to MSSA bacteremia, port infection. Hemodynamically stable, afebrile but with persistent leukocytosis, has been on decadron     Continue supportive care   Check daily CBC, CMP while inpatient to monitor for any evolving antibiotic toxicity or treatment failure   Antibiotics and additional management as below   Monitor stool output with new diarrhea but stool for c diff testing is negative  Bacteremia due to methicillin susceptible Staphylococcus aureus (MSSA)  Both sets of blood cx from admission with MSSA.   Suspect catheter associated bacteremia given local signs of infection (redness, swelling) along Left port site. S/p removal on 10/9, no purulence noted, cath tip cx positive. Rule out endovascular infection but TTE is without vegetation and bld cx have rapidly cleared. No other indwelling vascular or prosthetic devices. Duplex without acute DVT, has chronic RIJ thrombus.   CT chest/abd without septic pulmonary emboli or other infectious focus.   Has a newly detected R frontal lobe enhancing mass but on MRI appears more consistent with mets rather than septic  cerebral emboli.   Unclear cause for persistent leukocytosis, possibly reactive in the setting of steroid use     Continue cefazolin 2 q6h   FU repeat blood cx    Consult IR for venous access for iv abx once repeat blood cx are negative for 48 hrs. Will need to retain catheter for chemotherapy    Recommend 4 weeks of therapy through    Will need weekly CBC, CMP while on iv abx and ID fu within 2 weeks     Ovarian cancer (HCC)  S/p chemo, LEIDA-BSO in . Now with recurrent disease, retroperitoneal lymphadenopathy. Port placed , received first treatment of paclitaxel/carboplatin on . Concern for port site infection and brain mets as noted above     Additional mngt per oncology, continue steroids per neurosurgery  Acute kidney injury (HCC)  Likely pre-renal/septic. Resolved.  Transaminitis  Acute mild elevation in ALT, AST and ALP. Possibly secondary to sepsis and/or chemotherapy related. Also has underlying hepatic steatosis. Resolved    Morbid obesity (HCC)  A1C 6.3 in 3/2024. Risk factor for infection        Reviewed recommendations to continue iv abx, fu cx  with primary team who is in agreement. Please call with concerns or any changes in clinical status or new test results.      Subjective:  The patient has no complaints.   Denies fevers, chills, or sweats.  Denies nausea, vomiting, diarrhea.    Antibiotics:  cefazolin    Physical Exam     Temp:  [97.3 °F (36.3 °C)-97.7 °F (36.5 °C)] 97.5 °F (36.4 °C)  HR:  [70-78] 70  Resp:  [17-18] 17  BP: ()/(54-74) 121/74  SpO2:  [89 %-96 %] 96 %  Temp (24hrs), Av.5 °F (36.4 °C), Min:97.3 °F (36.3 °C), Max:97.7 °F (36.5 °C)  Current: Temperature: 97.5 °F (36.4 °C)    Intake/Output Summary (Last 24 hours) at 10/11/2024 1113  Last data filed at 10/11/2024 0801  Gross per 24 hour   Intake 480 ml   Output --   Net 480 ml       Physical exam findings reported by bedside and primary medical team staff      General Appearance:  Appearing fatigued but well,  nontoxic, and in no distress, appears stated age   Lungs:   Clear to auscultation bilaterally, no audible wheezes, rhonchi and rales, respirations unlabored   Heart:  Regular rate and rhythm, S1, S2 normal, no murmur, rub or gallop   Abdomen:   Soft, non-tender, non-distended, positive bowel sounds, no masses, no organomegaly    No CVA tenderness   Extremities: Extremities normal, atraumatic, no cyanosis, clubbing or edema   Skin: Skin color, texture, turgor normal, no rashes or lesions. No draining wounds noted.   Neurologic: Alert and oriented times 3,         Invasive Devices:   Implantable Apheresis Port Left Chest (Active)   De-accessed by: previous hospital chemo visit Nurse, per patient 10/07/24 0910   Site Assessment Intact;Dry;Red 10/08/24 1000       Peripheral IV 10/07/24 Right Antecubital (Active)   Site Assessment WDL 10/08/24 1000   Dressing Type Transparent 10/08/24 1000   Line Status Infusing 10/08/24 1000   Dressing Status Clean;Dry;Intact 10/08/24 1000   Dressing Change Due 10/11/24 10/08/24 0200   Reason Not Rotated Not due 10/08/24 0200       Labs, Imaging, & Other Studies     Lab Results:    I have personally reviewed pertinent labs.    Results from last 7 days   Lab Units 10/11/24  0524 10/10/24  0409 10/09/24  0452   WBC Thousand/uL 13.63* 12.13* 13.51*   HEMOGLOBIN g/dL 10.7* 10.6* 10.6*   PLATELETS Thousands/uL 386 290 211     Results from last 7 days   Lab Units 10/11/24  0524 10/10/24  0409 10/09/24  0452   POTASSIUM mmol/L 3.7 3.8 3.3*   CHLORIDE mmol/L 108 109* 109*   CO2 mmol/L 25 25 23   BUN mg/dL 21 19 22   CREATININE mg/dL 1.18 1.27 1.35*   EGFR ml/min/1.73sq m 50 45 42   CALCIUM mg/dL 9.1 9.0 8.8   AST U/L 21 29 45*   ALT U/L 16 35 69*   ALK PHOS U/L 122* 143* 151*     Results from last 7 days   Lab Units 10/09/24  0722 10/08/24  1649 10/07/24  0957 10/07/24  0930   BLOOD CULTURE  No Growth at 24 hrs.  No Growth at 24 hrs.  --  Staphylococcus aureus* Staphylococcus aureus*   GRAM  STAIN RESULT   --   --  Gram positive cocci in clusters* Gram positive cocci in clusters*   C DIFF TOXIN B BY PCR   --  Negative  --   --        Imaging Studies:   I have personally reviewed pertinent imaging study reports and images in PACS.      EKG, Pathology, and Other Studies:   I have personally reviewed pertinent reports.        Counseling/Coordination of care:       Total 50 minutes in evaluation of the patient and communication with the patient via telehealth of which 30 minutes was in counseling/coordination of care.  Extensive review of the medical records in epic including review of the notes, radiographs, and laboratory results.  My recommendations were discussed with the patient in detail who verbalized understanding.

## 2024-10-11 NOTE — PLAN OF CARE
Problem: PHYSICAL THERAPY ADULT  Goal: Performs mobility at highest level of function for planned discharge setting.  See evaluation for individualized goals.  Description: Treatment/Interventions: Functional transfer training, LE strengthening/ROM, Elevations, Therapeutic exercise, Endurance training, Patient/family training, Equipment eval/education, Gait training, Compensatory technique education, Spoke to nursing, Spoke to case management          See flowsheet documentation for full assessment, interventions and recommendations.  Outcome: Progressing  Note: Prognosis: Good  Problem List: Decreased strength, Decreased mobility, Decreased skin integrity, Impaired balance  Assessment: Pt seen for PT treatment session this date with interventions consisting of seated TE, transfer training, gait training, stair training, toilet transfers, neuromuscular re-education of movement performed to improve dynamic standing balance, and education provided as needed for safety and direction to improve functional mobility, safety awareness, and activity tolerance. Pt agreeable to PT treatment session upon arrival, pt found sitting OOB in recliner. At end of session, pt left sitting OOB in recliner with chair alarm activated, SCD's applied, and with all needs in reach. In comparison to previous session, pt with improvements in amount of assistance required with all mobility tasks performed and increased ambulation distance . Continue to recommend Level III (Minimum Resource Intensity) at time of d/c in order to maximize pt's functional independence and safety w/ mobility. Pt continues to be functioning below baseline level. PT will continue to see pt while here in order to address the deficits listed above and provide interventions consistent w/ POC in effort to achieve STGs.  Barriers to Discharge: None     Rehab Resource Intensity Level, PT: III (Minimum Resource Intensity)    See flowsheet documentation for full assessment.

## 2024-10-11 NOTE — ASSESSMENT & PLAN NOTE
Creatinine was 1.3 back in September 1724  Patient presented to ER with creatinine 1.93  Condition resolved

## 2024-10-11 NOTE — PROGRESS NOTES
Progress Note - Hospitalist   Name: Joce Tolbert 58 y.o. female I MRN: [id number]  Unit/Bed#: -01 I Date of Admission: 10/7/2024   Date of Service: 10/11/2024 I Hospital Day: 4    Assessment & Plan  Sepsis (HCC)  The patient presented with extreme fatigue, weakness, was found to have elevated WBCs, and her Chemo-Port on left upper chest is tender, red, with some swelling.  Patient initially did vancomycin and cefepime, which transition to cefazolin per ID recommendation  Blood culture showing positive-Staph aureus.  So dialysis catheter tip  Recommendation,-venous duplex of upper extremity left side negative.  CT chest abdomen reviewed.  TTE negative for vegetation  C. difficile, stool enteric panel negative  Per ID, continue IV antibiotic, expecting 4 weeks cefazolin 2 g every 6 hours till 11/5/2024  Repeat blood culture so far no growth, per ID recommendation, wait for 48 hours, if culture negative, can consult with IR for central line placement for IV antibiotic and chemo    Ovarian cancer (HCC)  The patient will continue with outpatient scheduled chemotherapy with her oncologist.  Metastasis to brain (HCC)  The patient was aware that she has a small mass with vasogenic edema on the right frontal lobe.  At this time, no acute neurological symptoms.  Dexamethasone and antiseizure medication can be started if deemed to be necessary.  Discussed with neurosurgery-May use Decadron, avoid antiepileptic at this time.  Also recommending to consult with radiation oncology (which is not available in this campus  After discussion has done with ID regarding neurosurgery recommendation about Decadron, since patient already on IV antibiotic for 48 hours, can resume Decadron 4 vasogenic edema.  Patient on hematology/oncologist updated over the phone-recommending continue Decadron and will follow patient outpatient basis    Neoplastic malignant related fatigue  Continue the fluid resuscitation and to treat underlying  cause of fatigue which is most likely cancer and ongoing chemotherapy as well as possible sepsis.  Acute kidney injury (HCC)  Creatinine was 1.3 back in September 1724  Patient presented to ER with creatinine 1.93  Condition resolved  Bacteremia due to methicillin susceptible Staphylococcus aureus (MSSA)  As documented in sepsis section  Morbid obesity (HCC)  Lifestyle modification  Transaminitis  Condition improved    VTE Pharmacologic Prophylaxis: VTE Score: 8 High Risk (Score >/= 5) - Pharmacological DVT Prophylaxis Ordered: heparin. Sequential Compression Devices Ordered.    Mobility:   Basic Mobility Inpatient Raw Score: 18  JH-HLM Goal: 6: Walk 10 steps or more  JH-HLM Achieved: 8: Walk 250 feet ot more  JH-HLM Goal achieved. Continue to encourage appropriate mobility.    Patient Centered Rounds: I performed bedside rounds with nursing staff today.   Discussions with Specialists or Other Care Team Provider: Infectious disease    Education and Discussions with Family / Patient: Updated  (daughter) via phone.    Current Length of Stay: 4 day(s)  Current Patient Status: Inpatient   Certification Statement: The patient will continue to require additional inpatient hospital stay due to monitorable conditions  Discharge Plan: Anticipate discharge in 48-72 hrs to home.    Code Status: Level 1 - Full Code    Subjective   Seen and evaluated during the RN.  Resting comfortably.  Denies any significant complaint.    Objective :  Temp:  [97.3 °F (36.3 °C)-97.7 °F (36.5 °C)] 97.5 °F (36.4 °C)  HR:  [70-82] 70  BP: ()/(54-74) 121/74  Resp:  [17-18] 17  SpO2:  [89 %-96 %] 96 %  O2 Device: Nasal cannula  Nasal Cannula O2 Flow Rate (L/min):  [2 L/min] 2 L/min    Body mass index is 49.09 kg/m².     Input and Output Summary (last 24 hours):     Intake/Output Summary (Last 24 hours) at 10/11/2024 0903  Last data filed at 10/11/2024 0801  Gross per 24 hour   Intake 480 ml   Output --   Net 480 ml       Physical  Exam  Vitals and nursing note reviewed.   Constitutional:       Appearance: Normal appearance. She is obese. She is not ill-appearing.   HENT:      Mouth/Throat:      Mouth: Mucous membranes are moist.   Eyes:      General: No scleral icterus.        Left eye: No discharge.      Extraocular Movements: Extraocular movements intact.      Conjunctiva/sclera: Conjunctivae normal.      Pupils: Pupils are equal, round, and reactive to light.   Cardiovascular:      Rate and Rhythm: Normal rate.      Heart sounds: No murmur heard.     No friction rub. No gallop.   Pulmonary:      Effort: Pulmonary effort is normal. No respiratory distress.      Breath sounds: No stridor. No wheezing or rhonchi.   Musculoskeletal:      Right lower leg: No edema.      Left lower leg: No edema.   Neurological:      Mental Status: She is alert and oriented to person, place, and time.      Cranial Nerves: No cranial nerve deficit.      Sensory: No sensory deficit.      Motor: No weakness.      Coordination: Coordination normal.       Lines/Drains:        Lab Results: I have reviewed the following results:   Results from last 7 days   Lab Units 10/11/24  0524   WBC Thousand/uL 13.63*   HEMOGLOBIN g/dL 10.7*   HEMATOCRIT % 32.2*   PLATELETS Thousands/uL 386   SEGS PCT % 83*   LYMPHO PCT % 11*   MONO PCT % 4   EOS PCT % 0     Results from last 7 days   Lab Units 10/11/24  0524   SODIUM mmol/L 142   POTASSIUM mmol/L 3.7   CHLORIDE mmol/L 108   CO2 mmol/L 25   BUN mg/dL 21   CREATININE mg/dL 1.18   ANION GAP mmol/L 9   CALCIUM mg/dL 9.1   ALBUMIN g/dL 3.1*   TOTAL BILIRUBIN mg/dL 0.38   ALK PHOS U/L 122*   ALT U/L 16   AST U/L 21   GLUCOSE RANDOM mg/dL 170*         Results from last 7 days   Lab Units 10/10/24  0034   POC GLUCOSE mg/dl 172*         Results from last 7 days   Lab Units 10/07/24  0930   LACTIC ACID mmol/L 1.1   PROCALCITONIN ng/ml 1.26*       Recent Cultures (last 7 days):   Results from last 7 days   Lab Units 10/09/24  0722  10/08/24  1649 10/07/24  0957 10/07/24  0930   BLOOD CULTURE  No Growth at 24 hrs.  No Growth at 24 hrs.  --  Staphylococcus aureus* Staphylococcus aureus*   GRAM STAIN RESULT   --   --  Gram positive cocci in clusters* Gram positive cocci in clusters*   C DIFF TOXIN B BY PCR   --  Negative  --   --        Imaging Results Review: No pertinent imaging studies reviewed.  Other Study Results Review: No additional pertinent studies reviewed.    Last 24 Hours Medication List:     Current Facility-Administered Medications:     acetaminophen (TYLENOL) tablet 975 mg, Q6H PRN    ceFAZolin (ANCEF) IVPB (premix in dextrose) 2,000 mg 50 mL, Q6H, Last Rate: 2,000 mg (10/11/24 0238)    dexamethasone (DECADRON) injection 4 mg, Q6H NERY    gabapentin (NEURONTIN) capsule 300 mg, BID    heparin (porcine) subcutaneous injection 5,000 Units, Q8H NERY    HYDROmorphone (DILAUDID) injection 0.5 mg, Q2H PRN    levothyroxine tablet 88 mcg, Early Morning    metoprolol succinate (TOPROL-XL) 24 hr tablet 50 mg, Q12H NERY    ondansetron (ZOFRAN) injection 4 mg, Q6H PRN    pyridoxine (VITAMIN B6) tablet 100 mg, Daily    tiZANidine (ZANAFLEX) tablet 4 mg, Q6H PRN    Administrative Statements   Today, Patient Was Seen By: Nemo Bajwa MD      **Please Note: This note may have been constructed using a voice recognition system.**

## 2024-10-11 NOTE — ASSESSMENT & PLAN NOTE
Both sets of blood cx from admission with MSSA.   Suspect catheter associated bacteremia given local signs of infection (redness, swelling) along Left port site. S/p removal on 10/9, no purulence noted, cath tip cx positive. Rule out endovascular infection but TTE is without vegetation and bld cx have rapidly cleared. No other indwelling vascular or prosthetic devices. Duplex without acute DVT, has chronic RIJ thrombus.   CT chest/abd without septic pulmonary emboli or other infectious focus.   Has a newly detected R frontal lobe enhancing mass but on MRI appears more consistent with mets rather than septic cerebral emboli.   Unclear cause for persistent leukocytosis, possibly reactive in the setting of steroid use     Continue cefazolin 2 q6h   FU repeat blood cx    Consult IR for venous access for iv abx once repeat blood cx are negative for 48 hrs. Will need to retain catheter for chemotherapy    Recommend 4 weeks of therapy through 11/5   Will need weekly CBC, CMP while on iv abx and ID fu within 2 weeks

## 2024-10-11 NOTE — PHYSICAL THERAPY NOTE
"   PHYSICAL THERAPY TREATMENT NOTE  NAME:  Joce Tolbert  DATE: 10/11/24    Length Of Stay: 4  Performed at least 2 patient identifiers during session: Name and Birthday    TREATMENT FLOWSHEET:    10/11/24 1503   PT Last Visit   PT Visit Date 10/11/24   Note Type   Note Type Treatment   Pain Assessment   Pain Assessment Tool 0-10   Pain Score No Pain   Restrictions/Precautions   Weight Bearing Precautions Per Order No   Other Precautions Chair Alarm;Bed Alarm   General   Chart Reviewed Yes   Response to Previous Treatment Patient with no complaints from previous session.   Family/Caregiver Present No   Cognition   Overall Cognitive Status WFL   Arousal/Participation Alert;Cooperative   Attention Within functional limits   Orientation Level Oriented X4   Memory Within functional limits   Following Commands Follows all commands and directions without difficulty   Subjective   Subjective \"I fell and a mirror hit my head so they did scans on my brain and they found a mass in my brain.\"   Bed Mobility   Additional Comments Pt. found sitting OOB in recliner upon entering room   Transfers   Sit to Stand 5  Supervision   Additional items Assist x 1;Armrests;Increased time required   Stand to Sit 5  Supervision   Additional items Assist x 1;Armrests;Increased time required   Stand pivot 5  Supervision   Additional items Assist x 1;Armrests;Increased time required   Toilet transfer 5  Supervision   Additional items Assist x 1;Armrests;Increased time required;Standard toilet   Additional Comments No AD used   Ambulation/Elevation   Gait pattern Improper Weight shift;Decreased foot clearance;Wide JAMEL;Short stride;Excessively slow;Step through pattern   Gait Assistance 5  Supervision   Additional items Assist x 1   Assistive Device None   Distance 400ft x2 and 15 ft x2   Stair Management Assistance 5  Supervision   Additional items Assist x 1;Verbal cues   Stair Management Technique Two rails;Alternating pattern;Foreward "   Number of Stairs 5  (B directions)   Balance   Static Sitting Normal   Dynamic Sitting Good   Static Standing Fair +   Dynamic Standing Fair +   Ambulatory Fair   Endurance Deficit   Endurance Deficit No   Activity Tolerance   Activity Tolerance Patient tolerated treatment well   Exercises   Quad Sets Sitting;20 reps;AROM;Bilateral   Heelslides Sitting;20 reps;AROM;Bilateral   Glute Sets Sitting;20 reps;AROM;Bilateral   Hip Flexion Sitting;20 reps;AROM;Bilateral   Hip Abduction Sitting;20 reps;AROM;Bilateral   Hip Adduction Sitting;20 reps;AROM;Bilateral   Knee AROM Long Arc Quad Sitting;20 reps;AROM;Bilateral   Ankle Pumps Sitting;20 reps;AROM;Bilateral   Marching Sitting;20 reps;AROM;Bilateral   Neuro re-ed Patient performed dynamic standing balance activities before and after toileting while completing clothing management and hygiene needs unsupported with distant S including multidirectional weight shifting, reaching across midline and out of JAMEL, and performed B directional changes.   Assessment   Prognosis Good   Problem List Decreased strength;Decreased mobility;Decreased skin integrity;Impaired balance   Goals   Patient Goals to go home   PT Treatment Day 2   Plan   Treatment/Interventions Functional transfer training;LE strengthening/ROM;Therapeutic exercise;Elevations   Progress Progressing toward goals   PT Frequency 2-3x/wk   Discharge Recommendation   Rehab Resource Intensity Level, PT III (Minimum Resource Intensity)   AM-PAC Basic Mobility Inpatient   Turning in Flat Bed Without Bedrails 4   Lying on Back to Sitting on Edge of Flat Bed Without Bedrails 4   Moving Bed to Chair 4   Standing Up From Chair Using Arms 4   Walk in Room 4   Climb 3-5 Stairs With Railing 4   Basic Mobility Inpatient Raw Score 24   Basic Mobility Standardized Score 57.68   [facility] Highest Level Of Mobility   -HLM Goal 8: Walk 250 feet or more   -HLM Achieved 8: Walk 250 feet ot more       The patient's AM-PAC Basic  Mobility Inpatient Short Form Raw Score is 24. A raw score greater than 16 suggests the patient may benefit from discharge to home. Please also refer to the recommendation of the Physical Therapist for safe discharge planning.    Pt seen for PT treatment session this date with interventions consisting of seated TE, transfer training, gait training, stair training, toilet transfers, neuromuscular re-education of movement performed to improve dynamic standing balance, and education provided as needed for safety and direction to improve functional mobility, safety awareness, and activity tolerance. Pt agreeable to PT treatment session upon arrival, pt found sitting OOB in recliner. At end of session, pt left sitting OOB in recliner with chair alarm activated, SCD's applied, and with all needs in reach. In comparison to previous session, pt with improvements in amount of assistance required with all mobility tasks performed and increased ambulation distance . Continue to recommend Level III (Minimum Resource Intensity) at time of d/c in order to maximize pt's functional independence and safety w/ mobility. Pt continues to be functioning below baseline level. PT will continue to see pt while here in order to address the deficits listed above and provide interventions consistent w/ POC in effort to achieve STGs.    Micheline Vera, PTA

## 2024-10-11 NOTE — CASE MANAGEMENT
Case Management Discharge Planning Note    Patient name Joce Tolbert  Location /-01 MRN [id number]  : 1965 Date 10/11/2024       Current Admission Date: 10/7/2024  Current Admission Diagnosis:Sepsis (HCC)   Patient Active Problem List    Diagnosis Date Noted Date Diagnosed    Morbid obesity (HCC) 10/08/2024     Transaminitis 10/08/2024     Metastasis to brain (HCC) 10/07/2024     Neoplastic malignant related fatigue 10/07/2024     Bacteremia due to methicillin susceptible Staphylococcus aureus (MSSA) 12/15/2021     Acute respiratory failure with hypoxia (HCC) 2021     Acute kidney injury (HCC) 2021     Hyponatremia 2021     Sepsis (HCC) 2021     Diabetes mellitus (HCC)      Hypertension      Hypercholesterolemia      Ovarian cancer (HCC)      Hypothyroidism        LOS (days): 4  Geometric Mean LOS (GMLOS) (days): 4.9  Days to GMLOS:1.1     OBJECTIVE:  Risk of Unplanned Readmission Score: 15.65         Current admission status: Inpatient   Preferred Pharmacy:   [id number] - [address] [address]  Phone: [phone] Fax: [phone]    Primary Care Provider: Ninfa Baltazar MD    Primary Insurance: [employer]  Secondary Insurance:     DISCHARGE DETAILS:     CM finalized IV Abx and obtained final script from provider. CM uploaded script to [facility] via [facility]. CM informed [facility] that IV abx would need be needed Monday AM as Pt is planned to discharge Monday and is scheduled for SOC Monday at 1500.    CM coordinated with [facility] via [facility] to confirm that SOC is Monday 10/13 and requested a visit from nurse for 1500 to  help with abx. Janette confirmed this.     CM will continue to follow for discharge planning needs.

## 2024-10-11 NOTE — PLAN OF CARE
Problem: PAIN - ADULT  Goal: Verbalizes/displays adequate comfort level or baseline comfort level  Description: Interventions:  - Encourage patient to monitor pain and request assistance  - Assess pain using appropriate pain scale  - Administer analgesics based on type and severity of pain and evaluate response  - Implement non-pharmacological measures as appropriate and evaluate response  - Consider cultural and social influences on pain and pain management  - Notify physician/advanced practitioner if interventions unsuccessful or patient reports new pain  Outcome: Progressing     Problem: INFECTION - ADULT  Goal: Absence or prevention of progression during hospitalization  Description: INTERVENTIONS:  - Assess and monitor for signs and symptoms of infection  - Monitor lab/diagnostic results  - Monitor all insertion sites, i.e. indwelling lines, tubes, and drains  - Monitor endotracheal if appropriate and nasal secretions for changes in amount and color  - [place] appropriate cooling/warming therapies per order  - Administer medications as ordered  - Instruct and encourage patient and family to use good hand hygiene technique  - Identify and instruct in appropriate isolation precautions for identified infection/condition  Outcome: Progressing     Problem: SAFETY ADULT  Goal: Patient will remain free of falls  Description: INTERVENTIONS:  - Educate patient/family on patient safety including physical limitations  - Instruct patient to call for assistance with activity   - Consult OT/PT to assist with strengthening/mobility   - Keep Call bell within reach  - Keep bed low and locked with side rails adjusted as appropriate  - Keep care items and personal belongings within reach  - Initiate and maintain comfort rounds  - Make Fall Risk Sign visible to staff  - Offer Toileting every 2 Hours, in advance of need  - Initiate/Maintain bed/chair alarm  - Apply yellow socks and bracelet for high fall risk patients  -  Consider moving patient to room near nurses station  Outcome: Progressing     Problem: DISCHARGE PLANNING  Goal: Discharge to home or other facility with appropriate resources  Description: INTERVENTIONS:  - Identify barriers to discharge w/patient and caregiver  - Arrange for needed discharge resources and transportation as appropriate  - Identify discharge learning needs (meds, wound care, etc.)  - Arrange for interpretive services to assist at discharge as needed  - Refer to Case Management Department for coordinating discharge planning if the patient needs post-hospital services based on physician/advanced practitioner order or complex needs related to functional status, cognitive ability, or social support system  Outcome: Progressing     Problem: Knowledge Deficit  Goal: Patient/family/caregiver demonstrates understanding of disease process, treatment plan, medications, and discharge instructions  Description: Complete learning assessment and assess knowledge base.  Interventions:  - Provide teaching at level of understanding  - Provide teaching via preferred learning methods  Outcome: Progressing

## 2024-10-11 NOTE — UTILIZATION REVIEW
Continued Stay Review    Date: 10/11                            Current Patient Class: Inpatient  Current Level of Care:  MS     HPI:58 y.o. female initially admitted on   10/07  w/sepsis  Due to MSSA bacteremia, port infection stage 2a ovarian Ca - on active chemo.      10/09  to IR For Port removal -  no erythema or purulent material identified.  Port removed in its entirely.  Stool to lab  (new diarrhea)  awaiting  C-diff result.     10/11    Monitor stool output with new diarrhea but stool for c diff testing is negative .   Continue cefazolin 2 q6h.   FU repeat blood cx .   Consult IR for venous access for iv abx once repeat blood cx are negative for 48 hrs. Will need to retain catheter for chemotherapy .  Recommend 4 weeks of therapy through 11/5.    Will need weekly CBC, CMP while on iv abx and ID fu within 2 weeks      Medications:   Scheduled Medications:  cefazolin, 2,000 mg, Intravenous, Q6H  dexamethasone, 4 mg, Intravenous, Q6H NERY  gabapentin, 300 mg, Oral, BID  heparin (porcine), 5,000 Units, Subcutaneous, Q8H NERY  levothyroxine, 88 mcg, Oral, Early Morning  metoprolol succinate, 50 mg, Oral, Q12H NERY  pyridoxine, 100 mg, Oral, Daily      Continuous IV Infusions:     PRN Meds:  acetaminophen, 975 mg, Oral, Q6H PRN  HYDROmorphone, 0.5 mg, Intravenous, Q2H PRN  ondansetron, 4 mg, Intravenous, Q6H PRN  tiZANidine, 4 mg, Oral, Q6H PRN      Discharge Plan: tbd    Vital Signs (last 3 days)       Date/Time Temp Pulse Resp BP MAP (mmHg) SpO2 Calculated FIO2 (%) - Nasal Cannula Nasal Cannula O2 Flow Rate (L/min) O2 Device Patient Position - Orthostatic VS Xi Coma Scale Score Pain    10/11/24 0930 -- -- -- -- -- -- -- -- -- -- -- No Pain    10/11/24 07:26:11 97.5 °F (36.4 °C) 70 17 121/74 90 -- -- -- -- -- -- --    10/11/24 0300 -- -- -- -- -- 96 % 28 2 L/min Nasal cannula -- -- --    10/10/24 22:48:36 97.3 °F (36.3 °C) 75 18 123/73 90 95 % -- -- -- -- -- --    10/10/24 20:18:30 -- 77 -- 95/54 68 89 % -- --  -- -- -- --    10/10/24 2012 -- -- -- -- -- -- -- -- -- -- -- No Pain     Pertinent Labs/Diagnostic Results:   Radiology:  IR port Removal   Final Interpretation by Bill Mckinnon DO (10/09 1408)      VAS upper limb venous duplex scan, unilateral/limited   Final Interpretation by Pepe Carcamo DO (10/08 1524)      CT chest abdomen pelvis wo contrast   Final Interpretation by Ronald Marti MD (10/08 1017)      1.  No convincing acute source of infection. There are nonspecific faint patchy scattered residual nodular opacity throughout both lungs, which may represent nonspecific infectious/inflammatory etiologies and/or scarring. However, these have dramatically    improved from prior studies in 2021.   2.  Small residual pulmonary nodules, not clearly changed from 2021.   3.  Ill-defined encasing retroperitoneal lesion, concerning for metastatic lymphadenopathy or lymphoma. Clinical correlation is necessary.   4.  Likely mild diffuse hepatic steatosis.      The study was marked in EPIC for significant notification.         Workstation performed: [id number]         MRI brain w wo contrast   Final Interpretation by E. Alec Schoenberger, MD (10/07 5239)      1.3 cm intra-axial mass in the anterior right frontal lobe with mild surrounding vasogenic edema that likely represents metastasis from known ovarian cancer.   No other mass or abnormal enhancement.      The study was marked in EPIC for immediate notification.      Workstation performed: [id number]         CT head without contrast   Final Interpretation by Ike Julien DO (10/07 1100)      Within the anterior inferior right frontal lobe there is focal heterogeneity including decreased attenuation suggestive of vasogenic edema. MRI of the brain with contrast recommended. See above discussion regarding differential considerations.         I personally discussed this study with WILBER LYNN on 10/7/2024 11:00 AM.                         Resident: Magdaleno Scales I, the attending radiologist, have reviewed the images and agree with the final report above.      Workstation performed: [id number]         XR chest pa and lateral   Final Interpretation by Aubree Tomlin MD (10/07 1025)      Low lung volumes producing vascular crowding. No definite pneumonia.            Workstation performed: [id number]           Cardiology:  Echo complete w/ contrast if indicated   Final Result by Yong Farfan MD (10/08 1502)        Left Ventricle: LV is normal in size and function. Estimated LVEF is    65%. No gabe wall motion abnormality noted. Wall thickness is mildly    increased. Diastolic function is mildly abnormal, consistent with grade I    (abnormal) relaxation.     No valvular pathology noted.  Valves were well-visualized with no    evidence of bacterial vegetations.           GI:  No orders to display           Results from last 7 days   Lab Units 10/11/24  0524 10/10/24  0409 10/09/24  0452 10/07/24  0930   WBC Thousand/uL 13.63* 12.13* 13.51* 13.77*   HEMOGLOBIN g/dL 10.7* 10.6* 10.6* 11.5   HEMATOCRIT % 32.2* 32.2* 32.0* 34.2*   PLATELETS Thousands/uL 386 290 211 143*   TOTAL NEUT ABS Thousands/µL 11.29* 10.33* 11.11* 11.68*         Results from last 7 days   Lab Units 10/11/24  0524 10/10/24  0409 10/09/24  0452 10/08/24  0526 10/07/24  0930   SODIUM mmol/L 142 143 142 140 136   POTASSIUM mmol/L 3.7 3.8 3.3* 4.0 3.7   CHLORIDE mmol/L 108 109* 109* 109* 99   CO2 mmol/L 25 25 23 20* 24   ANION GAP mmol/L 9 9 10 11 13   BUN mg/dL 21 19 22 24 29*   CREATININE mg/dL 1.18 1.27 1.35* 1.35* 1.93*   EGFR ml/min/1.73sq m 50 45 42 42 27   CALCIUM mg/dL 9.1 9.0 8.8 8.4 9.3   MAGNESIUM mg/dL  --   --   --  2.0  --    PHOSPHORUS mg/dL  --   --   --  3.7  --      Results from last 7 days   Lab Units 10/11/24  0524 10/10/24  0409 10/09/24  0452 10/08/24  0526 10/07/24  0930   AST U/L 21 29 45* 63* 52*   ALT U/L 16 35 69* 91* 74*   ALK PHOS U/L 122*  143* 151* 178* 185*   TOTAL PROTEIN g/dL 6.3* 6.7 6.5 6.5 7.6   ALBUMIN g/dL 3.1* 3.1* 3.1* 3.3* 3.7   TOTAL BILIRUBIN mg/dL 0.38 0.43 0.42 0.70 0.92     Results from last 7 days   Lab Units 10/10/24  0034   POC GLUCOSE mg/dl 172*     Results from last 7 days   Lab Units 10/11/24  0524 10/10/24  0409 10/09/24  0452 10/08/24  0526 10/07/24  0930   GLUCOSE RANDOM mg/dL 170* 173* 142* 179* 152*       Results from last 7 days   Lab Units 10/07/24  0930   PROCALCITONIN ng/ml 1.26*     Results from last 7 days   Lab Units 10/07/24  0930   LACTIC ACID mmol/L 1.1       Results from last 7 days   Lab Units 10/07/24  0957   CLARITY UA  Slightly Cloudy   COLOR UA  Yellow   SPEC GRAV UA  <=1.005   PH UA  6.0   GLUCOSE UA mg/dl Negative   KETONES UA mg/dl Negative   BLOOD UA  Moderate*   PROTEIN UA mg/dl 30 (1+)*   NITRITE UA  Negative   BILIRUBIN UA  Negative   UROBILINOGEN UA E.U./dl 0.2   LEUKOCYTES UA  Negative   WBC UA /hpf 0-5   RBC UA /hpf 0-1   BACTERIA UA /hpf Moderate*   EPITHELIAL CELLS WET PREP /hpf Occasional                     Results from last 7 days   Lab Units 10/08/24  1649   C DIFF TOXIN B BY PCR  Negative     Results from last 7 days   Lab Units 10/09/24  1015   SALMONELLA SP PCR  Negative   SHIGELLA SP/ENTEROINVASIVE E. COLI (EIEC)  Negative   CAMPYLOBACTER SP (JEJUNI AND COLI)  Negative   SHIGA TOXIN 1/SHIGA TOXIN 2  Negative         Results from last 7 days   Lab Units 10/09/24  0722 10/07/24  0957 10/07/24  0930   BLOOD CULTURE  No Growth at 48 hrs.  No Growth at 24 hrs. Staphylococcus aureus* Staphylococcus aureus*   GRAM STAIN RESULT   --  Gram positive cocci in clusters* Gram positive cocci in clusters*                   Network Utilization Review Department  ATTENTION: Please call with any questions or concerns to [phone] and carefully listen to the prompts so that you are directed to the right person. All voicemails are confidential.   For Discharge needs, contact Care Management DC Support  Team at [phone] opt. 2  Send all requests for admission clinical reviews, approved or denied determinations and any other requests to dedicated fax number below belonging to the campus where the patient is receiving treatment. List of dedicated fax numbers for the Facilities:  FACILITY NAME UR FAX NUMBER   ADMISSION DENIALS (Administrative/Medical Necessity) [phone]   DISCHARGE SUPPORT TEAM (NETWORK) [phone]   PARENT CHILD HEALTH (Maternity/NICU/Pediatrics) [phone]   [facility] [phone]   [facility] [phone]   [facility] [phone]   [facility] [phone]   [facility] [phone]   [facility] [phone]   [facility] [phone]   [facility] [phone]   [facility] [phone]   [facility] [phone]   [facility] [phone]   [facility] [phone]

## 2024-10-11 NOTE — PLAN OF CARE
Problem: PAIN - ADULT  Goal: Verbalizes/displays adequate comfort level or baseline comfort level  Description: Interventions:  - Encourage patient to monitor pain and request assistance  - Assess pain using appropriate pain scale  - Administer analgesics based on type and severity of pain and evaluate response  - Implement non-pharmacological measures as appropriate and evaluate response  - Consider cultural and social influences on pain and pain management  - Notify physician/advanced practitioner if interventions unsuccessful or patient reports new pain  Outcome: Progressing     Problem: INFECTION - ADULT  Goal: Absence or prevention of progression during hospitalization  Description: INTERVENTIONS:  - Assess and monitor for signs and symptoms of infection  - Monitor lab/diagnostic results  - Monitor all insertion sites, i.e. indwelling lines, tubes, and drains  - Monitor endotracheal if appropriate and nasal secretions for changes in amount and color  - [place] appropriate cooling/warming therapies per order  - Administer medications as ordered  - Instruct and encourage patient and family to use good hand hygiene technique  - Identify and instruct in appropriate isolation precautions for identified infection/condition  Outcome: Progressing     Problem: SAFETY ADULT  Goal: Patient will remain free of falls  Description: INTERVENTIONS:  - Educate patient/family on patient safety including physical limitations  - Instruct patient to call for assistance with activity   - Consult OT/PT to assist with strengthening/mobility   - Keep Call bell within reach  - Keep bed low and locked with side rails adjusted as appropriate  - Keep care items and personal belongings within reach  - Initiate and maintain comfort rounds  - Make Fall Risk Sign visible to staff  - Offer Toileting every 2 Hours, in advance of need  - Initiate/Maintain bed alarm  - Obtain necessary fall risk management equipment: non-skid socks  - Apply  yellow socks and bracelet for high fall risk patients  - Consider moving patient to room near nurses station  Outcome: Progressing  Goal: Maintain or return to baseline ADL function  Description: INTERVENTIONS:  -  Assess patient's ability to carry out ADLs; assess patient's baseline for ADL function and identify physical deficits which impact ability to perform ADLs (bathing, care of mouth/teeth, toileting, grooming, dressing, etc.)  - Assess/evaluate cause of self-care deficits   - Assess range of motion  - Assess patient's mobility; develop plan if impaired  - Assess patient's need for assistive devices and provide as appropriate  - Encourage maximum independence but intervene and supervise when necessary  - Involve family in performance of ADLs  - Assess for home care needs following discharge   - Consider OT consult to assist with ADL evaluation and planning for discharge  - Provide patient education as appropriate  Outcome: Progressing  Goal: Maintains/Returns to pre admission functional level  Description: INTERVENTIONS:  - Perform AM-PAC 6 Click Basic Mobility/ Daily Activity assessment daily.  - Set and communicate daily mobility goal to care team and patient/family/caregiver.   - Collaborate with rehabilitation services on mobility goals if consulted  - Ambulate patient 3 times a day  - Out of bed to chair 3 times a day   - Out of bed for meals 3 times a day  - Out of bed for toileting  - Record patient progress and toleration of activity level   Outcome: Progressing     Problem: DISCHARGE PLANNING  Goal: Discharge to home or other facility with appropriate resources  Description: INTERVENTIONS:  - Identify barriers to discharge w/patient and caregiver  - Arrange for needed discharge resources and transportation as appropriate  - Identify discharge learning needs (meds, wound care, etc.)  - Arrange for interpretive services to assist at discharge as needed  - Refer to Case Management Department for  coordinating discharge planning if the patient needs post-hospital services based on physician/advanced practitioner order or complex needs related to functional status, cognitive ability, or social support system  Outcome: Progressing     Problem: Knowledge Deficit  Goal: Patient/family/caregiver demonstrates understanding of disease process, treatment plan, medications, and discharge instructions  Description: Complete learning assessment and assess knowledge base.  Interventions:  - Provide teaching at level of understanding  - Provide teaching via preferred learning methods  Outcome: Progressing

## 2024-10-11 NOTE — ASSESSMENT & PLAN NOTE
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  At this time, no acute neurological symptoms.  Dexamethasone and antiseizure medication can be started if deemed to be necessary.  Discussed with neurosurgery-May use Decadron, avoid antiepileptic at this time.  Also recommending to consult with radiation oncology (which is not available in this campus  After discussion has done with ID regarding neurosurgery recommendation about Decadron, since patient already on IV antibiotic for 48 hours, can resume Decadron 4 vasogenic edema.  Patient on hematology/oncologist updated over the phone-recommending continue Decadron and will follow patient outpatient basis

## 2024-10-11 NOTE — ASSESSMENT & PLAN NOTE
Leukocytosis, tachycardia, tachypnea. Due to MSSA bacteremia, port infection. Hemodynamically stable, afebrile but with persistent leukocytosis, has been on decadron     Continue supportive care   Check daily CBC, CMP while inpatient to monitor for any evolving antibiotic toxicity or treatment failure   Antibiotics and additional management as below   Monitor stool output with new diarrhea but stool for c diff testing is negative

## 2024-10-12 LAB
ALBUMIN SERPL BCG-MCNC: 3.2 G/DL (ref 3.5–5)
ALP SERPL-CCNC: 115 U/L (ref 34–104)
ALT SERPL W P-5'-P-CCNC: 14 U/L (ref 7–52)
ANION GAP SERPL CALCULATED.3IONS-SCNC: 8 MMOL/L (ref 4–13)
AST SERPL W P-5'-P-CCNC: 24 U/L (ref 13–39)
BASOPHILS # BLD MANUAL: 0 THOUSAND/UL (ref 0–0.1)
BASOPHILS NFR MAR MANUAL: 0 % (ref 0–1)
BILIRUB SERPL-MCNC: 0.33 MG/DL (ref 0.2–1)
BUN SERPL-MCNC: 23 MG/DL (ref 5–25)
CALCIUM ALBUM COR SERPL-MCNC: 9.4 MG/DL (ref 8.3–10.1)
CALCIUM SERPL-MCNC: 8.8 MG/DL (ref 8.4–10.2)
CHLORIDE SERPL-SCNC: 106 MMOL/L (ref 96–108)
CO2 SERPL-SCNC: 26 MMOL/L (ref 21–32)
CREAT SERPL-MCNC: 1.2 MG/DL (ref 0.6–1.3)
EOSINOPHIL # BLD MANUAL: 0 THOUSAND/UL (ref 0–0.4)
EOSINOPHIL NFR BLD MANUAL: 0 % (ref 0–6)
ERYTHROCYTE [DISTWIDTH] IN BLOOD BY AUTOMATED COUNT: 15.6 % (ref 11.6–15.1)
GFR SERPL CREATININE-BSD FRML MDRD: 49 ML/MIN/1.73SQ M
GLUCOSE SERPL-MCNC: 167 MG/DL (ref 65–140)
HCT VFR BLD AUTO: 33.4 % (ref 34.8–46.1)
HGB BLD-MCNC: 10.7 G/DL (ref 11.5–15.4)
LYMPHOCYTES # BLD AUTO: 1.2 THOUSAND/UL (ref 0.6–4.47)
LYMPHOCYTES # BLD AUTO: 9 % (ref 14–44)
MCH RBC QN AUTO: 27.5 PG (ref 26.8–34.3)
MCHC RBC AUTO-ENTMCNC: 32 G/DL (ref 31.4–37.4)
MCV RBC AUTO: 86 FL (ref 82–98)
METAMYELOCYTE ABSOLUTE CT: 0.4 THOUSAND/UL (ref 0–0.1)
METAMYELOCYTES NFR BLD MANUAL: 3 % (ref 0–1)
MONOCYTES # BLD AUTO: 1.07 THOUSAND/UL (ref 0–1.22)
MONOCYTES NFR BLD: 8 % (ref 4–12)
NEUTROPHILS # BLD MANUAL: 10.66 THOUSAND/UL (ref 1.85–7.62)
NEUTS SEG NFR BLD AUTO: 80 % (ref 43–75)
PLATELET # BLD AUTO: 460 THOUSANDS/UL (ref 149–390)
PLATELET BLD QL SMEAR: ADEQUATE
PMV BLD AUTO: 9.1 FL (ref 8.9–12.7)
POTASSIUM SERPL-SCNC: 3.6 MMOL/L (ref 3.5–5.3)
PROT SERPL-MCNC: 6.2 G/DL (ref 6.4–8.4)
RBC # BLD AUTO: 3.89 MILLION/UL (ref 3.81–5.12)
RBC MORPH BLD: NORMAL
SODIUM SERPL-SCNC: 140 MMOL/L (ref 135–147)
WBC # BLD AUTO: 13.32 THOUSAND/UL (ref 4.31–10.16)

## 2024-10-12 PROCEDURE — 85007 BL SMEAR W/DIFF WBC COUNT: CPT | Performed by: FAMILY MEDICINE

## 2024-10-12 PROCEDURE — 99232 SBSQ HOSP IP/OBS MODERATE 35: CPT | Performed by: FAMILY MEDICINE

## 2024-10-12 PROCEDURE — 80053 COMPREHEN METABOLIC PANEL: CPT | Performed by: FAMILY MEDICINE

## 2024-10-12 PROCEDURE — 85027 COMPLETE CBC AUTOMATED: CPT | Performed by: FAMILY MEDICINE

## 2024-10-12 RX ADMIN — DEXAMETHASONE SODIUM PHOSPHATE 4 MG: 4 INJECTION INTRA-ARTICULAR; INTRALESIONAL; INTRAMUSCULAR; INTRAVENOUS; SOFT TISSUE at 05:11

## 2024-10-12 RX ADMIN — CEFAZOLIN SODIUM 2000 MG: 2 SOLUTION INTRAVENOUS at 09:47

## 2024-10-12 RX ADMIN — HEPARIN SODIUM 5000 UNITS: 5000 INJECTION, SOLUTION INTRAVENOUS; SUBCUTANEOUS at 13:49

## 2024-10-12 RX ADMIN — METOPROLOL SUCCINATE 50 MG: 50 TABLET, EXTENDED RELEASE ORAL at 09:47

## 2024-10-12 RX ADMIN — PYRIDOXINE HCL TAB 50 MG 100 MG: 50 TAB at 09:47

## 2024-10-12 RX ADMIN — HEPARIN SODIUM 5000 UNITS: 5000 INJECTION, SOLUTION INTRAVENOUS; SUBCUTANEOUS at 05:11

## 2024-10-12 RX ADMIN — DEXAMETHASONE SODIUM PHOSPHATE 4 MG: 4 INJECTION INTRA-ARTICULAR; INTRALESIONAL; INTRAMUSCULAR; INTRAVENOUS; SOFT TISSUE at 17:45

## 2024-10-12 RX ADMIN — LEVOTHYROXINE SODIUM 88 MCG: 88 TABLET ORAL at 05:11

## 2024-10-12 RX ADMIN — CEFAZOLIN SODIUM 2000 MG: 2 SOLUTION INTRAVENOUS at 21:22

## 2024-10-12 RX ADMIN — GABAPENTIN 300 MG: 300 CAPSULE ORAL at 17:45

## 2024-10-12 RX ADMIN — DEXAMETHASONE SODIUM PHOSPHATE 4 MG: 4 INJECTION INTRA-ARTICULAR; INTRALESIONAL; INTRAMUSCULAR; INTRAVENOUS; SOFT TISSUE at 00:43

## 2024-10-12 RX ADMIN — HEPARIN SODIUM 5000 UNITS: 5000 INJECTION, SOLUTION INTRAVENOUS; SUBCUTANEOUS at 21:22

## 2024-10-12 RX ADMIN — GABAPENTIN 300 MG: 300 CAPSULE ORAL at 09:47

## 2024-10-12 RX ADMIN — CEFAZOLIN SODIUM 2000 MG: 2 SOLUTION INTRAVENOUS at 15:19

## 2024-10-12 RX ADMIN — DEXAMETHASONE SODIUM PHOSPHATE 4 MG: 4 INJECTION INTRA-ARTICULAR; INTRALESIONAL; INTRAMUSCULAR; INTRAVENOUS; SOFT TISSUE at 13:48

## 2024-10-12 RX ADMIN — METOPROLOL SUCCINATE 50 MG: 50 TABLET, EXTENDED RELEASE ORAL at 21:22

## 2024-10-12 RX ADMIN — CEFAZOLIN SODIUM 2000 MG: 2 SOLUTION INTRAVENOUS at 03:10

## 2024-10-12 NOTE — PLAN OF CARE
Problem: PAIN - ADULT  Goal: Verbalizes/displays adequate comfort level or baseline comfort level  Description: Interventions:  - Encourage patient to monitor pain and request assistance  - Assess pain using appropriate pain scale  - Administer analgesics based on type and severity of pain and evaluate response  - Implement non-pharmacological measures as appropriate and evaluate response  - Consider cultural and social influences on pain and pain management  - Notify physician/advanced practitioner if interventions unsuccessful or patient reports new pain  Outcome: Progressing     Problem: INFECTION - ADULT  Goal: Absence or prevention of progression during hospitalization  Description: INTERVENTIONS:  - Assess and monitor for signs and symptoms of infection  - Monitor lab/diagnostic results  - Monitor all insertion sites, i.e. indwelling lines, tubes, and drains  - Monitor endotracheal if appropriate and nasal secretions for changes in amount and color  - [place] appropriate cooling/warming therapies per order  - Administer medications as ordered  - Instruct and encourage patient and family to use good hand hygiene technique  - Identify and instruct in appropriate isolation precautions for identified infection/condition  Outcome: Progressing     Problem: SAFETY ADULT  Goal: Patient will remain free of falls  Description: INTERVENTIONS:  - Educate patient/family on patient safety including physical limitations  - Instruct patient to call for assistance with activity   - Consult OT/PT to assist with strengthening/mobility   - Keep Call bell within reach  - Keep bed low and locked with side rails adjusted as appropriate  - Keep care items and personal belongings within reach  - Initiate and maintain comfort rounds  - Make Fall Risk Sign visible to staff  - Offer Toileting every 2 Hours, in advance of need  - Initiate/Maintain bed/chair alarm  - Apply yellow socks and bracelet for high fall risk patients  -  Consider moving patient to room near nurses station  Outcome: Progressing     Problem: DISCHARGE PLANNING  Goal: Discharge to home or other facility with appropriate resources  Description: INTERVENTIONS:  - Identify barriers to discharge w/patient and caregiver  - Arrange for needed discharge resources and transportation as appropriate  - Identify discharge learning needs (meds, wound care, etc.)  - Arrange for interpretive services to assist at discharge as needed  - Refer to Case Management Department for coordinating discharge planning if the patient needs post-hospital services based on physician/advanced practitioner order or complex needs related to functional status, cognitive ability, or social support system  Outcome: Progressing     Problem: Knowledge Deficit  Goal: Patient/family/caregiver demonstrates understanding of disease process, treatment plan, medications, and discharge instructions  Description: Complete learning assessment and assess knowledge base.  Interventions:  - Provide teaching at level of understanding  - Provide teaching via preferred learning methods  Outcome: Progressing     Problem: Prexisting or High Potential for Compromised Skin Integrity  Goal: Skin integrity is maintained or improved  Description: INTERVENTIONS:  - Identify patients at risk for skin breakdown  - Assess and monitor skin integrity  - Assess and monitor nutrition and hydration status  - Monitor labs   - Assess for incontinence   - Turn and reposition patient  - Assist with mobility/ambulation  - Relieve pressure over bony prominences  - Avoid friction and shearing  - Provide appropriate hygiene as needed including keeping skin clean and dry  - Evaluate need for skin moisturizer/barrier cream  - Collaborate with interdisciplinary team   - Patient/family teaching  - Consider wound care consult   Outcome: Progressing   `

## 2024-10-12 NOTE — PLAN OF CARE
Problem: PAIN - ADULT  Goal: Verbalizes/displays adequate comfort level or baseline comfort level  Description: Interventions:  - Encourage patient to monitor pain and request assistance  - Assess pain using appropriate pain scale  - Administer analgesics based on type and severity of pain and evaluate response  - Implement non-pharmacological measures as appropriate and evaluate response  - Consider cultural and social influences on pain and pain management  - Notify physician/advanced practitioner if interventions unsuccessful or patient reports new pain  Outcome: Progressing     Problem: INFECTION - ADULT  Goal: Absence or prevention of progression during hospitalization  Description: INTERVENTIONS:  - Assess and monitor for signs and symptoms of infection  - Monitor lab/diagnostic results  - Monitor all insertion sites, i.e. indwelling lines, tubes, and drains  - Monitor endotracheal if appropriate and nasal secretions for changes in amount and color  - [place] appropriate cooling/warming therapies per order  - Administer medications as ordered  - Instruct and encourage patient and family to use good hand hygiene technique  - Identify and instruct in appropriate isolation precautions for identified infection/condition  Outcome: Progressing     Problem: SAFETY ADULT  Goal: Patient will remain free of falls  Description: INTERVENTIONS:  - Educate patient/family on patient safety including physical limitations  - Instruct patient to call for assistance with activity   - Consult OT/PT to assist with strengthening/mobility   - Keep Call bell within reach  - Keep bed low and locked with side rails adjusted as appropriate  - Keep care items and personal belongings within reach  - Initiate and maintain comfort rounds  - Make Fall Risk Sign visible to staff  - Offer Toileting every 2 Hours, in advance of need  - Initiate/Maintain bed/chair alarm  - Obtain necessary fall risk management equipment: bed/chair alarm, non  slip socks  - Apply yellow socks and bracelet for high fall risk patients  - Consider moving patient to room near nurses station  Outcome: Progressing  Goal: Maintain or return to baseline ADL function  Description: INTERVENTIONS:  -  Assess patient's ability to carry out ADLs; assess patient's baseline for ADL function and identify physical deficits which impact ability to perform ADLs (bathing, care of mouth/teeth, toileting, grooming, dressing, etc.)  - Assess/evaluate cause of self-care deficits   - Assess range of motion  - Assess patient's mobility; develop plan if impaired  - Assess patient's need for assistive devices and provide as appropriate  - Encourage maximum independence but intervene and supervise when necessary  - Involve family in performance of ADLs  - Assess for home care needs following discharge   - Consider OT consult to assist with ADL evaluation and planning for discharge  - Provide patient education as appropriate  Outcome: Progressing  Goal: Maintains/Returns to pre admission functional level  Description: INTERVENTIONS:  - Perform AM-PAC 6 Click Basic Mobility/ Daily Activity assessment daily.  - Set and communicate daily mobility goal to care team and patient/family/caregiver.   - Collaborate with rehabilitation services on mobility goals if consulted  - Perform Range of Motion 3 times a day.  - Reposition patient every 2 hours.  - Dangle patient 3 times a day  - Stand patient 3 times a day  - Ambulate patient 3 times a day  - Out of bed to chair 3 times a day   - Out of bed for meals 3 times a day  - Out of bed for toileting  - Record patient progress and toleration of activity level   Outcome: Progressing     Problem: DISCHARGE PLANNING  Goal: Discharge to home or other facility with appropriate resources  Description: INTERVENTIONS:  - Identify barriers to discharge w/patient and caregiver  - Arrange for needed discharge resources and transportation as appropriate  - Identify  discharge learning needs (meds, wound care, etc.)  - Arrange for interpretive services to assist at discharge as needed  - Refer to Case Management Department for coordinating discharge planning if the patient needs post-hospital services based on physician/advanced practitioner order or complex needs related to functional status, cognitive ability, or social support system  Outcome: Progressing     Problem: Knowledge Deficit  Goal: Patient/family/caregiver demonstrates understanding of disease process, treatment plan, medications, and discharge instructions  Description: Complete learning assessment and assess knowledge base.  Interventions:  - Provide teaching at level of understanding  - Provide teaching via preferred learning methods  Outcome: Progressing     Problem: Prexisting or High Potential for Compromised Skin Integrity  Goal: Skin integrity is maintained or improved  Description: INTERVENTIONS:  - Identify patients at risk for skin breakdown  - Assess and monitor skin integrity  - Assess and monitor nutrition and hydration status  - Monitor labs   - Assess for incontinence   - Turn and reposition patient  - Assist with mobility/ambulation  - Relieve pressure over bony prominences  - Avoid friction and shearing  - Provide appropriate hygiene as needed including keeping skin clean and dry  - Evaluate need for skin moisturizer/barrier cream  - Collaborate with interdisciplinary team   - Patient/family teaching  - Consider wound care consult   Outcome: Progressing

## 2024-10-12 NOTE — PROGRESS NOTES
Progress Note - Hospitalist   Name: Joce Tolbert 58 y.o. female I MRN: [id number]  Unit/Bed#: -01 I Date of Admission: 10/7/2024   Date of Service: 10/12/2024 I Hospital Day: 5    Assessment & Plan  Sepsis (HCC)  The patient presented with extreme fatigue, weakness, was found to have elevated WBCs, and her Chemo-Port on left upper chest is tender, red, with some swelling.  Patient initially did vancomycin and cefepime, which transition to cefazolin per ID recommendation  Blood culture showing positive-Staph aureus.  So does the  dialysis catheter tip  Recommendation,-venous duplex of upper extremity left side negative.  CT chest abdomen reviewed.  TTE negative for vegetation  C. difficile, stool enteric panel negative  Per ID, continue IV antibiotic, expecting 4 weeks cefazolin 2 g every 6 hours till 11/5/2024  Repeat blood culture so far no growth, per ID recommendation, wait for 48 hours,  IR consult placed for PICC placement for antibiotic as well as chemotherapy  Ovarian cancer (HCC)  The patient will continue with outpatient scheduled chemotherapy with her oncologist.  Metastasis to brain (HCC)  The patient was aware that she has a small mass with vasogenic edema on the right frontal lobe.  At this time, no acute neurological symptoms.  Dexamethasone and antiseizure medication can be started if deemed to be necessary.  Discussed with neurosurgery-May use Decadron, avoid antiepileptic at this time.  Also recommending to consult with radiation oncology (which is not available in this campus  After discussion has done with ID regarding neurosurgery recommendation about Decadron, since patient already on IV antibiotic for 48 hours, can resume Decadron 4 vasogenic edema.  Patient on hematology/oncologist updated over the phone-recommending continue Decadron and will follow patient outpatient basis    Neoplastic malignant related fatigue  Continue the fluid resuscitation and to treat underlying cause of  fatigue which is most likely cancer and ongoing chemotherapy as well as possible sepsis.  Acute kidney injury (HCC)  Creatinine was 1.3 back in September 1724  Patient presented to ER with creatinine 1.93  Condition resolved  Bacteremia due to methicillin susceptible Staphylococcus aureus (MSSA)  As documented in sepsis section  Morbid obesity (HCC)  Lifestyle modification  Transaminitis  Condition improved    VTE Pharmacologic Prophylaxis: VTE Score: 8 High Risk (Score >/= 5) - Pharmacological DVT Prophylaxis Ordered: heparin. Sequential Compression Devices Ordered.    Mobility:   Basic Mobility Inpatient Raw Score: 24  JH-HLM Goal: 8: Walk 250 feet or more  JH-HLM Achieved: 8: Walk 250 feet ot more  JH-HLM Goal achieved. Continue to encourage appropriate mobility.    Patient Centered Rounds: I performed bedside rounds with nursing staff today.   Discussions with Specialists or Other Care Team Provider: None    Education and Discussions with Family / Patient: Updated  (daughter) via phone.-left voice message for [employer]    Current Length of Stay: 5 day(s)  Current Patient Status: Inpatient   Certification Statement: The patient will continue to require additional inpatient hospital stay due to monitor above condition  Discharge Plan: Anticipate discharge in 24-48 hrs to home with home services.    Code Status: Level 1 - Full Code    Subjective   Seen and evaluated during the run.  Resting comfortably.  Denies any significant complaint.    Objective :  Temp:  [97 °F (36.1 °C)-97.5 °F (36.4 °C)] 97.3 °F (36.3 °C)  HR:  [65-71] 68  BP: (119-135)/(60-87) 135/87  Resp:  [16-18] 18  SpO2:  [68 %-96 %] 95 %  O2 Device: Nasal cannula  Nasal Cannula O2 Flow Rate (L/min):  [2 L/min] 2 L/min    Body mass index is 49.09 kg/m².     Input and Output Summary (last 24 hours):     Intake/Output Summary (Last 24 hours) at 10/12/2024 0922  Last data filed at 10/12/2024 0830  Gross per 24 hour   Intake 1080 ml   Output  --   Net 1080 ml       Physical Exam  Vitals and nursing note reviewed.   Constitutional:       Appearance: She is obese. She is not ill-appearing or diaphoretic.   Eyes:      General: No scleral icterus.        Left eye: No discharge.      Extraocular Movements: Extraocular movements intact.      Conjunctiva/sclera: Conjunctivae normal.      Pupils: Pupils are equal, round, and reactive to light.   Cardiovascular:      Rate and Rhythm: Normal rate.      Heart sounds: No murmur heard.     No friction rub. No gallop.   Pulmonary:      Effort: Pulmonary effort is normal. No respiratory distress.      Breath sounds: No stridor. No wheezing or rhonchi.   Musculoskeletal:      Right lower leg: No edema.      Left lower leg: No edema.   Neurological:      Mental Status: She is alert and oriented to person, place, and time.      Cranial Nerves: No cranial nerve deficit.      Sensory: No sensory deficit.      Motor: No weakness.      Coordination: Coordination normal.           Lines/Drains:              Lab Results: I have reviewed the following results:   Results from last 7 days   Lab Units 10/12/24  0509 10/11/24  0524   WBC Thousand/uL 13.32* 13.63*   HEMOGLOBIN g/dL 10.7* 10.7*   HEMATOCRIT % 33.4* 32.2*   PLATELETS Thousands/uL 460* 386   SEGS PCT %  --  83*   LYMPHO PCT % 9* 11*   MONO PCT % 8 4   EOS PCT % 0 0     Results from last 7 days   Lab Units 10/12/24  0509   SODIUM mmol/L 140   POTASSIUM mmol/L 3.6   CHLORIDE mmol/L 106   CO2 mmol/L 26   BUN mg/dL 23   CREATININE mg/dL 1.20   ANION GAP mmol/L 8   CALCIUM mg/dL 8.8   ALBUMIN g/dL 3.2*   TOTAL BILIRUBIN mg/dL 0.33   ALK PHOS U/L 115*   ALT U/L 14   AST U/L 24   GLUCOSE RANDOM mg/dL 167*         Results from last 7 days   Lab Units 10/10/24  0034   POC GLUCOSE mg/dl 172*         Results from last 7 days   Lab Units 10/07/24  0930   LACTIC ACID mmol/L 1.1   PROCALCITONIN ng/ml 1.26*       Recent Cultures (last 7 days):   Results from last 7 days   Lab Units  10/09/24  0722 10/08/24  1649 10/07/24  0957 10/07/24  0930   BLOOD CULTURE  No Growth at 48 hrs.  No Growth at 48 hrs.  --  Staphylococcus aureus* Staphylococcus aureus*   GRAM STAIN RESULT   --   --  Gram positive cocci in clusters* Gram positive cocci in clusters*   C DIFF TOXIN B BY PCR   --  Negative  --   --        Imaging Results Review: No pertinent imaging studies reviewed.  Other Study Results Review: No additional pertinent studies reviewed.    Last 24 Hours Medication List:     Current Facility-Administered Medications:     acetaminophen (TYLENOL) tablet 975 mg, Q6H PRN    ceFAZolin (ANCEF) IVPB (premix in dextrose) 2,000 mg 50 mL, Q6H, Last Rate: 2,000 mg (10/12/24 0310)    dexamethasone (DECADRON) injection 4 mg, Q6H NERY    gabapentin (NEURONTIN) capsule 300 mg, BID    heparin (porcine) subcutaneous injection 5,000 Units, Q8H NERY    HYDROmorphone (DILAUDID) injection 0.5 mg, Q2H PRN    levothyroxine tablet 88 mcg, Early Morning    metoprolol succinate (TOPROL-XL) 24 hr tablet 50 mg, Q12H NERY    ondansetron (ZOFRAN) injection 4 mg, Q6H PRN    pyridoxine (VITAMIN B6) tablet 100 mg, Daily    tiZANidine (ZANAFLEX) tablet 4 mg, Q6H PRN    Administrative Statements   Today, Patient Was Seen By: Nemo Bajwa MD      **Please Note: This note may have been constructed using a voice recognition system.**

## 2024-10-12 NOTE — ASSESSMENT & PLAN NOTE
The patient presented with extreme fatigue, weakness, was found to have elevated WBCs, and her Chemo-Port on left upper chest is tender, red, with some swelling.  Patient initially did vancomycin and cefepime, which transition to cefazolin per ID recommendation  Blood culture showing positive-Staph aureus.  So does the  dialysis catheter tip  Recommendation,-venous duplex of upper extremity left side negative.  CT chest abdomen reviewed.  TTE negative for vegetation  C. difficile, stool enteric panel negative  Per ID, continue IV antibiotic, expecting 4 weeks cefazolin 2 g every 6 hours till 11/5/2024  Repeat blood culture so far no growth, per ID recommendation, wait for 48 hours,  IR consult placed for PICC placement for antibiotic as well as chemotherapy

## 2024-10-13 LAB
ALBUMIN SERPL BCG-MCNC: 3.3 G/DL (ref 3.5–5)
ALP SERPL-CCNC: 122 U/L (ref 34–104)
ALT SERPL W P-5'-P-CCNC: 17 U/L (ref 7–52)
ANION GAP SERPL CALCULATED.3IONS-SCNC: 8 MMOL/L (ref 4–13)
AST SERPL W P-5'-P-CCNC: 30 U/L (ref 13–39)
BILIRUB SERPL-MCNC: 0.34 MG/DL (ref 0.2–1)
BUN SERPL-MCNC: 25 MG/DL (ref 5–25)
CALCIUM ALBUM COR SERPL-MCNC: 9.5 MG/DL (ref 8.3–10.1)
CALCIUM SERPL-MCNC: 8.9 MG/DL (ref 8.4–10.2)
CHLORIDE SERPL-SCNC: 107 MMOL/L (ref 96–108)
CO2 SERPL-SCNC: 25 MMOL/L (ref 21–32)
CREAT SERPL-MCNC: 1.14 MG/DL (ref 0.6–1.3)
ERYTHROCYTE [DISTWIDTH] IN BLOOD BY AUTOMATED COUNT: 15.7 % (ref 11.6–15.1)
GFR SERPL CREATININE-BSD FRML MDRD: 52 ML/MIN/1.73SQ M
GLUCOSE SERPL-MCNC: 168 MG/DL (ref 65–140)
HCT VFR BLD AUTO: 35.2 % (ref 34.8–46.1)
HGB BLD-MCNC: 11.5 G/DL (ref 11.5–15.4)
MCH RBC QN AUTO: 27.8 PG (ref 26.8–34.3)
MCHC RBC AUTO-ENTMCNC: 32.7 G/DL (ref 31.4–37.4)
MCV RBC AUTO: 85 FL (ref 82–98)
PLATELET # BLD AUTO: 530 THOUSANDS/UL (ref 149–390)
PMV BLD AUTO: 9.2 FL (ref 8.9–12.7)
POTASSIUM SERPL-SCNC: 3.7 MMOL/L (ref 3.5–5.3)
PROT SERPL-MCNC: 6.5 G/DL (ref 6.4–8.4)
RBC # BLD AUTO: 4.13 MILLION/UL (ref 3.81–5.12)
SODIUM SERPL-SCNC: 140 MMOL/L (ref 135–147)
WBC # BLD AUTO: 15.15 THOUSAND/UL (ref 4.31–10.16)

## 2024-10-13 PROCEDURE — 80053 COMPREHEN METABOLIC PANEL: CPT | Performed by: FAMILY MEDICINE

## 2024-10-13 PROCEDURE — 99232 SBSQ HOSP IP/OBS MODERATE 35: CPT | Performed by: FAMILY MEDICINE

## 2024-10-13 PROCEDURE — 85027 COMPLETE CBC AUTOMATED: CPT | Performed by: FAMILY MEDICINE

## 2024-10-13 RX ADMIN — DEXAMETHASONE SODIUM PHOSPHATE 4 MG: 4 INJECTION INTRA-ARTICULAR; INTRALESIONAL; INTRAMUSCULAR; INTRAVENOUS; SOFT TISSUE at 17:12

## 2024-10-13 RX ADMIN — GABAPENTIN 300 MG: 300 CAPSULE ORAL at 17:12

## 2024-10-13 RX ADMIN — LEVOTHYROXINE SODIUM 88 MCG: 88 TABLET ORAL at 05:57

## 2024-10-13 RX ADMIN — HEPARIN SODIUM 5000 UNITS: 5000 INJECTION, SOLUTION INTRAVENOUS; SUBCUTANEOUS at 14:13

## 2024-10-13 RX ADMIN — DEXAMETHASONE SODIUM PHOSPHATE 4 MG: 4 INJECTION INTRA-ARTICULAR; INTRALESIONAL; INTRAMUSCULAR; INTRAVENOUS; SOFT TISSUE at 05:57

## 2024-10-13 RX ADMIN — CEFAZOLIN SODIUM 2000 MG: 2 SOLUTION INTRAVENOUS at 21:13

## 2024-10-13 RX ADMIN — DEXAMETHASONE SODIUM PHOSPHATE 4 MG: 4 INJECTION INTRA-ARTICULAR; INTRALESIONAL; INTRAMUSCULAR; INTRAVENOUS; SOFT TISSUE at 12:14

## 2024-10-13 RX ADMIN — GABAPENTIN 300 MG: 300 CAPSULE ORAL at 08:23

## 2024-10-13 RX ADMIN — DEXAMETHASONE SODIUM PHOSPHATE 4 MG: 4 INJECTION INTRA-ARTICULAR; INTRALESIONAL; INTRAMUSCULAR; INTRAVENOUS; SOFT TISSUE at 23:13

## 2024-10-13 RX ADMIN — DEXAMETHASONE SODIUM PHOSPHATE 4 MG: 4 INJECTION INTRA-ARTICULAR; INTRALESIONAL; INTRAMUSCULAR; INTRAVENOUS; SOFT TISSUE at 00:08

## 2024-10-13 RX ADMIN — PYRIDOXINE HCL TAB 50 MG 100 MG: 50 TAB at 08:24

## 2024-10-13 RX ADMIN — CEFAZOLIN SODIUM 2000 MG: 2 SOLUTION INTRAVENOUS at 02:54

## 2024-10-13 RX ADMIN — METOPROLOL SUCCINATE 50 MG: 50 TABLET, EXTENDED RELEASE ORAL at 21:13

## 2024-10-13 RX ADMIN — CEFAZOLIN SODIUM 2000 MG: 2 SOLUTION INTRAVENOUS at 08:24

## 2024-10-13 RX ADMIN — METOPROLOL SUCCINATE 50 MG: 50 TABLET, EXTENDED RELEASE ORAL at 08:23

## 2024-10-13 RX ADMIN — HEPARIN SODIUM 5000 UNITS: 5000 INJECTION, SOLUTION INTRAVENOUS; SUBCUTANEOUS at 05:57

## 2024-10-13 RX ADMIN — CEFAZOLIN SODIUM 2000 MG: 2 SOLUTION INTRAVENOUS at 14:13

## 2024-10-13 RX ADMIN — HEPARIN SODIUM 5000 UNITS: 5000 INJECTION, SOLUTION INTRAVENOUS; SUBCUTANEOUS at 21:13

## 2024-10-13 NOTE — ASSESSMENT & PLAN NOTE
The patient presented with extreme fatigue, weakness, was found to have elevated WBCs, and her Chemo-Port on left upper chest is tender, red, with some swelling.  Patient initially did vancomycin and cefepime, which transition to cefazolin per ID recommendation  Blood culture showing positive-Staph aureus.  So does the  dialysis catheter tip  Recommendation,-venous duplex of upper extremity left side negative.  CT chest abdomen reviewed.  TTE negative for vegetation  C. difficile, stool enteric panel negative  Per ID, continue IV antibiotic, expecting 4 weeks cefazolin 2 g every 6 hours till 11/5/2024  Repeat blood culture so far no growth, per ID recommendation, wait for 48 hours,  IR consult placed for PICC placement for antibiotic as well as chemotherapy  Patient having uptrending leukocytosis-suspect at this time steroid side effect since patient clinically improving.  Will monitor

## 2024-10-13 NOTE — PLAN OF CARE
Problem: PAIN - ADULT  Goal: Verbalizes/displays adequate comfort level or baseline comfort level  Description: Interventions:  - Encourage patient to monitor pain and request assistance  - Assess pain using appropriate pain scale  - Administer analgesics based on type and severity of pain and evaluate response  - Implement non-pharmacological measures as appropriate and evaluate response  - Consider cultural and social influences on pain and pain management  - Notify physician/advanced practitioner if interventions unsuccessful or patient reports new pain  Outcome: Progressing     Problem: INFECTION - ADULT  Goal: Absence or prevention of progression during hospitalization  Description: INTERVENTIONS:  - Assess and monitor for signs and symptoms of infection  - Monitor lab/diagnostic results  - Monitor all insertion sites, i.e. indwelling lines, tubes, and drains  - Monitor endotracheal if appropriate and nasal secretions for changes in amount and color  - [place] appropriate cooling/warming therapies per order  - Administer medications as ordered  - Instruct and encourage patient and family to use good hand hygiene technique  - Identify and instruct in appropriate isolation precautions for identified infection/condition  Outcome: Progressing     Problem: SAFETY ADULT  Goal: Patient will remain free of falls  Description: INTERVENTIONS:  - Educate patient/family on patient safety including physical limitations  - Instruct patient to call for assistance with activity   - Consult OT/PT to assist with strengthening/mobility   - Keep Call bell within reach  - Keep bed low and locked with side rails adjusted as appropriate  - Keep care items and personal belongings within reach  - Initiate and maintain comfort rounds  - Make Fall Risk Sign visible to staff  - Offer Toileting every 2 Hours, in advance of need  - Initiate/Maintain bed/chair alarm  - Obtain necessary fall risk management equipment: bed/chair alarm  -  Apply yellow socks and bracelet for high fall risk patients  - Consider moving patient to room near nurses station  Outcome: Progressing  Goal: Maintain or return to baseline ADL function  Description: INTERVENTIONS:  -  Assess patient's ability to carry out ADLs; assess patient's baseline for ADL function and identify physical deficits which impact ability to perform ADLs (bathing, care of mouth/teeth, toileting, grooming, dressing, etc.)  - Assess/evaluate cause of self-care deficits   - Assess range of motion  - Assess patient's mobility; develop plan if impaired  - Assess patient's need for assistive devices and provide as appropriate  - Encourage maximum independence but intervene and supervise when necessary  - Involve family in performance of ADLs  - Assess for home care needs following discharge   - Consider OT consult to assist with ADL evaluation and planning for discharge  - Provide patient education as appropriate  Outcome: Progressing  Goal: Maintains/Returns to pre admission functional level  Description: INTERVENTIONS:  - Perform AM-PAC 6 Click Basic Mobility/ Daily Activity assessment daily.  - Set and communicate daily mobility goal to care team and patient/family/caregiver.   - Collaborate with rehabilitation services on mobility goals if consulted  - Ambulate patient 3 times a day  - Out of bed to chair 3 times a day   - Out of bed for meals 3 times a day  - Out of bed for toileting  - Record patient progress and toleration of activity level   Outcome: Progressing     Problem: DISCHARGE PLANNING  Goal: Discharge to home or other facility with appropriate resources  Description: INTERVENTIONS:  - Identify barriers to discharge w/patient and caregiver  - Arrange for needed discharge resources and transportation as appropriate  - Identify discharge learning needs (meds, wound care, etc.)  - Arrange for interpretive services to assist at discharge as needed  - Refer to Case Management Department for  coordinating discharge planning if the patient needs post-hospital services based on physician/advanced practitioner order or complex needs related to functional status, cognitive ability, or social support system  Outcome: Progressing     Problem: Knowledge Deficit  Goal: Patient/family/caregiver demonstrates understanding of disease process, treatment plan, medications, and discharge instructions  Description: Complete learning assessment and assess knowledge base.  Interventions:  - Provide teaching at level of understanding  - Provide teaching via preferred learning methods  Outcome: Progressing     Problem: Prexisting or High Potential for Compromised Skin Integrity  Goal: Skin integrity is maintained or improved  Description: INTERVENTIONS:  - Identify patients at risk for skin breakdown  - Assess and monitor skin integrity  - Assess and monitor nutrition and hydration status  - Monitor labs   - Assess for incontinence   - Turn and reposition patient  - Assist with mobility/ambulation  - Relieve pressure over bony prominences  - Avoid friction and shearing  - Provide appropriate hygiene as needed including keeping skin clean and dry  - Evaluate need for skin moisturizer/barrier cream  - Collaborate with interdisciplinary team   - Patient/family teaching  - Consider wound care consult   Outcome: Progressing

## 2024-10-13 NOTE — PROGRESS NOTES
Progress Note - Hospitalist   Name: Joce Tolbert 58 y.o. female I MRN: [id number]  Unit/Bed#: -01 I Date of Admission: 10/7/2024   Date of Service: 10/13/2024 I Hospital Day: 6    Assessment & Plan  Sepsis (HCC)  The patient presented with extreme fatigue, weakness, was found to have elevated WBCs, and her Chemo-Port on left upper chest is tender, red, with some swelling.  Patient initially did vancomycin and cefepime, which transition to cefazolin per ID recommendation  Blood culture showing positive-Staph aureus.  So does the  dialysis catheter tip  Recommendation,-venous duplex of upper extremity left side negative.  CT chest abdomen reviewed.  TTE negative for vegetation  C. difficile, stool enteric panel negative  Per ID, continue IV antibiotic, expecting 4 weeks cefazolin 2 g every 6 hours till 11/5/2024  Repeat blood culture so far no growth, per ID recommendation, wait for 48 hours,  IR consult placed for PICC placement for antibiotic as well as chemotherapy  Patient having uptrending leukocytosis-suspect at this time steroid side effect since patient clinically improving.  Will monitor  Ovarian cancer (HCC)  The patient will continue with outpatient scheduled chemotherapy with her oncologist.  Metastasis to brain (HCC)  The patient was aware that she has a small mass with vasogenic edema on the right frontal lobe.  At this time, no acute neurological symptoms.  Dexamethasone and antiseizure medication can be started if deemed to be necessary.  Discussed with neurosurgery-May use Decadron, avoid antiepileptic at this time.  Also recommending to consult with radiation oncology (which is not available in this campus  After discussion has done with ID regarding neurosurgery recommendation about Decadron, since patient already on IV antibiotic for 48 hours, can resume Decadron 4 vasogenic edema.  Patient on hematology/oncologist updated over the phone-recommending continue Decadron and will follow  patient outpatient basis    Neoplastic malignant related fatigue  Continue the fluid resuscitation and to treat underlying cause of fatigue which is most likely cancer and ongoing chemotherapy as well as possible sepsis.  Acute kidney injury (HCC)  Creatinine was 1.3 back in September 1724  Patient presented to ER with creatinine 1.93  Condition resolved  Bacteremia due to methicillin susceptible Staphylococcus aureus (MSSA)  As documented in sepsis section  Morbid obesity (HCC)  Lifestyle modification  Transaminitis  Condition improved    VTE Pharmacologic Prophylaxis: VTE Score: 8 High Risk (Score >/= 5) - Pharmacological DVT Prophylaxis Ordered: heparin. Sequential Compression Devices Ordered.    Mobility:   Basic Mobility Inpatient Raw Score: 24  JH-HLM Goal: 8: Walk 250 feet or more  JH-HLM Achieved: 8: Walk 250 feet ot more  JH-HLM Goal achieved. Continue to encourage appropriate mobility.    Patient Centered Rounds: I performed bedside rounds with nursing staff today.   Discussions with Specialists or Other Care Team Provider: None    Education and Discussions with Family / Patient: Updated  (daughter) at bedside.    Current Length of Stay: 6 day(s)  Current Patient Status: Inpatient   Certification Statement: The patient will continue to require additional inpatient hospital stay due to monitorable monitor  Discharge Plan: Anticipate discharge in 24-48 hrs to home with home services.    Code Status: Level 1 - Full Code    Subjective   Seen and evaluated during the run.  Resting comfortably but denies any significant complaint.    Objective :  Temp:  [97.2 °F (36.2 °C)-97.5 °F (36.4 °C)] 97.2 °F (36.2 °C)  HR:  [64-68] 64  BP: (123-129)/(72-78) 127/77  Resp:  [16-19] 19  SpO2:  [91 %-95 %] 94 %  O2 Device: None (Room air)    Body mass index is 49.09 kg/m².     Input and Output Summary (last 24 hours):     Intake/Output Summary (Last 24 hours) at 10/13/2024 0855  Last data filed at 10/13/2024  0827  Gross per 24 hour   Intake 1140 ml   Output --   Net 1140 ml       Physical Exam  Vitals and nursing note reviewed.   Constitutional:       Appearance: She is obese. She is not ill-appearing or diaphoretic.   Eyes:      General: No scleral icterus.        Left eye: No discharge.      Extraocular Movements: Extraocular movements intact.      Conjunctiva/sclera: Conjunctivae normal.      Pupils: Pupils are equal, round, and reactive to light.   Cardiovascular:      Rate and Rhythm: Normal rate.      Heart sounds: No murmur heard.     No friction rub. No gallop.   Pulmonary:      Effort: Pulmonary effort is normal. No respiratory distress.      Breath sounds: No stridor. No wheezing or rhonchi.   Abdominal:      General: There is no distension.      Palpations: There is no mass.      Tenderness: There is no abdominal tenderness.      Hernia: No hernia is present.   Musculoskeletal:      Right lower leg: No edema.      Left lower leg: No edema.   Neurological:      Mental Status: She is alert.      Cranial Nerves: No cranial nerve deficit.      Sensory: No sensory deficit.      Motor: No weakness.      Coordination: Coordination normal.         Lines/Drains:              Lab Results: I have reviewed the following results:   Results from last 7 days   Lab Units 10/13/24  0609 10/12/24  0509 10/11/24  0524   WBC Thousand/uL 15.15* 13.32* 13.63*   HEMOGLOBIN g/dL 11.5 10.7* 10.7*   HEMATOCRIT % 35.2 33.4* 32.2*   PLATELETS Thousands/uL 530* 460* 386   SEGS PCT %  --   --  83*   LYMPHO PCT %  --  9* 11*   MONO PCT %  --  8 4   EOS PCT %  --  0 0     Results from last 7 days   Lab Units 10/13/24  0609   SODIUM mmol/L 140   POTASSIUM mmol/L 3.7   CHLORIDE mmol/L 107   CO2 mmol/L 25   BUN mg/dL 25   CREATININE mg/dL 1.14   ANION GAP mmol/L 8   CALCIUM mg/dL 8.9   ALBUMIN g/dL 3.3*   TOTAL BILIRUBIN mg/dL 0.34   ALK PHOS U/L 122*   ALT U/L 17   AST U/L 30   GLUCOSE RANDOM mg/dL 168*         Results from last 7 days   Lab  Units 10/10/24  0034   POC GLUCOSE mg/dl 172*         Results from last 7 days   Lab Units 10/07/24  0930   LACTIC ACID mmol/L 1.1   PROCALCITONIN ng/ml 1.26*       Recent Cultures (last 7 days):   Results from last 7 days   Lab Units 10/09/24  0722 10/08/24  1649 10/07/24  0957 10/07/24  0930   BLOOD CULTURE  No Growth at 72 hrs.  No Growth at 72 hrs.  --  Staphylococcus aureus* Staphylococcus aureus*   GRAM STAIN RESULT   --   --  Gram positive cocci in clusters* Gram positive cocci in clusters*   C DIFF TOXIN B BY PCR   --  Negative  --   --        Imaging Results Review: No pertinent imaging studies reviewed.  Other Study Results Review: No additional pertinent studies reviewed.    Last 24 Hours Medication List:     Current Facility-Administered Medications:     acetaminophen (TYLENOL) tablet 975 mg, Q6H PRN    ceFAZolin (ANCEF) IVPB (premix in dextrose) 2,000 mg 50 mL, Q6H, Last Rate: 2,000 mg (10/13/24 0824)    dexamethasone (DECADRON) injection 4 mg, Q6H NERY    gabapentin (NEURONTIN) capsule 300 mg, BID    heparin (porcine) subcutaneous injection 5,000 Units, Q8H NERY    HYDROmorphone (DILAUDID) injection 0.5 mg, Q2H PRN    levothyroxine tablet 88 mcg, Early Morning    metoprolol succinate (TOPROL-XL) 24 hr tablet 50 mg, Q12H NERY    ondansetron (ZOFRAN) injection 4 mg, Q6H PRN    pyridoxine (VITAMIN B6) tablet 100 mg, Daily    tiZANidine (ZANAFLEX) tablet 4 mg, Q6H PRN    Administrative Statements   Today, Patient Was Seen By: Nemo Bajwa MD      **Please Note: This note may have been constructed using a voice recognition system.**

## 2024-10-13 NOTE — PLAN OF CARE
Problem: PAIN - ADULT  Goal: Verbalizes/displays adequate comfort level or baseline comfort level  Description: Interventions:  - Encourage patient to monitor pain and request assistance  - Assess pain using appropriate pain scale  - Administer analgesics based on type and severity of pain and evaluate response  - Implement non-pharmacological measures as appropriate and evaluate response  - Consider cultural and social influences on pain and pain management  - Notify physician/advanced practitioner if interventions unsuccessful or patient reports new pain  Outcome: Progressing     Problem: INFECTION - ADULT  Goal: Absence or prevention of progression during hospitalization  Description: INTERVENTIONS:  - Assess and monitor for signs and symptoms of infection  - Monitor lab/diagnostic results  - Monitor all insertion sites, i.e. indwelling lines, tubes, and drains  - Monitor endotracheal if appropriate and nasal secretions for changes in amount and color  - [place] appropriate cooling/warming therapies per order  - Administer medications as ordered  - Instruct and encourage patient and family to use good hand hygiene technique  - Identify and instruct in appropriate isolation precautions for identified infection/condition  Outcome: Progressing     Problem: SAFETY ADULT  Goal: Patient will remain free of falls  Description: INTERVENTIONS:  - Educate patient/family on patient safety including physical limitations  - Instruct patient to call for assistance with activity   - Consult OT/PT to assist with strengthening/mobility   - Keep Call bell within reach  - Keep bed low and locked with side rails adjusted as appropriate  - Keep care items and personal belongings within reach  - Initiate and maintain comfort rounds  - Make Fall Risk Sign visible to staff  - Offer Toileting every 2 Hours, in advance of need  - Initiate/Maintain alarm  - Obtain necessary fall risk management equipment:   - Apply yellow socks and  bracelet for high fall risk patients  - Consider moving patient to room near nurses station  Outcome: Progressing  Goal: Maintain or return to baseline ADL function  Description: INTERVENTIONS:  -  Assess patient's ability to carry out ADLs; assess patient's baseline for ADL function and identify physical deficits which impact ability to perform ADLs (bathing, care of mouth/teeth, toileting, grooming, dressing, etc.)  - Assess/evaluate cause of self-care deficits   - Assess range of motion  - Assess patient's mobility; develop plan if impaired  - Assess patient's need for assistive devices and provide as appropriate  - Encourage maximum independence but intervene and supervise when necessary  - Involve family in performance of ADLs  - Assess for home care needs following discharge   - Consider OT consult to assist with ADL evaluation and planning for discharge  - Provide patient education as appropriate  Outcome: Progressing  Goal: Maintains/Returns to pre admission functional level  Description: INTERVENTIONS:  - Perform AM-PAC 6 Click Basic Mobility/ Daily Activity assessment daily.  - Set and communicate daily mobility goal to care team and patient/family/caregiver.   - Collaborate with rehabilitation services on mobility goals if consulted  - Perform Range of Motion 2 times a day.  - Reposition patient every 2 hours.  - Dangle patient 2 times a day  - Stand patient 2 times a day  - Ambulate patient 2 times a day  - Out of bed to chair 2 times a day   - Out of bed for meals 2 times a day  - Out of bed for toileting  - Record patient progress and toleration of activity level   Outcome: Progressing     Problem: DISCHARGE PLANNING  Goal: Discharge to home or other facility with appropriate resources  Description: INTERVENTIONS:  - Identify barriers to discharge w/patient and caregiver  - Arrange for needed discharge resources and transportation as appropriate  - Identify discharge learning needs (meds, wound care,  etc.)  - Arrange for interpretive services to assist at discharge as needed  - Refer to Case Management Department for coordinating discharge planning if the patient needs post-hospital services based on physician/advanced practitioner order or complex needs related to functional status, cognitive ability, or social support system  Outcome: Progressing     Problem: Knowledge Deficit  Goal: Patient/family/caregiver demonstrates understanding of disease process, treatment plan, medications, and discharge instructions  Description: Complete learning assessment and assess knowledge base.  Interventions:  - Provide teaching at level of understanding  - Provide teaching via preferred learning methods  Outcome: Progressing     Problem: Prexisting or High Potential for Compromised Skin Integrity  Goal: Skin integrity is maintained or improved  Description: INTERVENTIONS:  - Identify patients at risk for skin breakdown  - Assess and monitor skin integrity  - Assess and monitor nutrition and hydration status  - Monitor labs   - Assess for incontinence   - Turn and reposition patient  - Assist with mobility/ambulation  - Relieve pressure over bony prominences  - Avoid friction and shearing  - Provide appropriate hygiene as needed including keeping skin clean and dry  - Evaluate need for skin moisturizer/barrier cream  - Collaborate with interdisciplinary team   - Patient/family teaching  - Consider wound care consult   Outcome: Progressing

## 2024-10-14 ENCOUNTER — APPOINTMENT (INPATIENT)
Dept: INTERVENTIONAL RADIOLOGY/VASCULAR | Facility: HOSPITAL | Age: 59
DRG: 721 | End: 2024-10-14
Attending: FAMILY MEDICINE
Payer: COMMERCIAL

## 2024-10-14 VITALS
BODY MASS INDEX: 48.82 KG/M2 | WEIGHT: 293 LBS | HEART RATE: 70 BPM | TEMPERATURE: 97.3 F | DIASTOLIC BLOOD PRESSURE: 80 MMHG | SYSTOLIC BLOOD PRESSURE: 137 MMHG | HEIGHT: 65 IN | OXYGEN SATURATION: 93 % | RESPIRATION RATE: 18 BRPM

## 2024-10-14 PROBLEM — Z45.2 PICC (PERIPHERALLY INSERTED CENTRAL CATHETER) IN PLACE: Status: ACTIVE | Noted: 2024-10-14

## 2024-10-14 LAB
ALBUMIN SERPL BCG-MCNC: 3.2 G/DL (ref 3.5–5)
ALP SERPL-CCNC: 95 U/L (ref 34–104)
ALT SERPL W P-5'-P-CCNC: 15 U/L (ref 7–52)
ANION GAP SERPL CALCULATED.3IONS-SCNC: 8 MMOL/L (ref 4–13)
AST SERPL W P-5'-P-CCNC: 26 U/L (ref 13–39)
BACTERIA BLD CULT: NORMAL
BACTERIA BLD CULT: NORMAL
BILIRUB SERPL-MCNC: 0.34 MG/DL (ref 0.2–1)
BUN SERPL-MCNC: 29 MG/DL (ref 5–25)
CALCIUM ALBUM COR SERPL-MCNC: 9.3 MG/DL (ref 8.3–10.1)
CALCIUM SERPL-MCNC: 8.7 MG/DL (ref 8.4–10.2)
CHLORIDE SERPL-SCNC: 107 MMOL/L (ref 96–108)
CO2 SERPL-SCNC: 25 MMOL/L (ref 21–32)
CREAT SERPL-MCNC: 1.16 MG/DL (ref 0.6–1.3)
ERYTHROCYTE [DISTWIDTH] IN BLOOD BY AUTOMATED COUNT: 15.5 % (ref 11.6–15.1)
GFR SERPL CREATININE-BSD FRML MDRD: 51 ML/MIN/1.73SQ M
GLUCOSE SERPL-MCNC: 180 MG/DL (ref 65–140)
HCT VFR BLD AUTO: 33.7 % (ref 34.8–46.1)
HGB BLD-MCNC: 11.3 G/DL (ref 11.5–15.4)
MCH RBC QN AUTO: 28.5 PG (ref 26.8–34.3)
MCHC RBC AUTO-ENTMCNC: 33.5 G/DL (ref 31.4–37.4)
MCV RBC AUTO: 85 FL (ref 82–98)
PLATELET # BLD AUTO: 455 THOUSANDS/UL (ref 149–390)
PMV BLD AUTO: 8.9 FL (ref 8.9–12.7)
POTASSIUM SERPL-SCNC: 3.8 MMOL/L (ref 3.5–5.3)
PROT SERPL-MCNC: 6.1 G/DL (ref 6.4–8.4)
RBC # BLD AUTO: 3.96 MILLION/UL (ref 3.81–5.12)
SODIUM SERPL-SCNC: 140 MMOL/L (ref 135–147)
WBC # BLD AUTO: 14.52 THOUSAND/UL (ref 4.31–10.16)

## 2024-10-14 PROCEDURE — 85027 COMPLETE CBC AUTOMATED: CPT | Performed by: FAMILY MEDICINE

## 2024-10-14 PROCEDURE — C1751 CATH, INF, PER/CENT/MIDLINE: HCPCS

## 2024-10-14 PROCEDURE — 36569 INSJ PICC 5 YR+ W/O IMAGING: CPT

## 2024-10-14 PROCEDURE — 97535 SELF CARE MNGMENT TRAINING: CPT

## 2024-10-14 PROCEDURE — 97116 GAIT TRAINING THERAPY: CPT

## 2024-10-14 PROCEDURE — 80053 COMPREHEN METABOLIC PANEL: CPT | Performed by: FAMILY MEDICINE

## 2024-10-14 PROCEDURE — 99239 HOSP IP/OBS DSCHRG MGMT >30: CPT | Performed by: FAMILY MEDICINE

## 2024-10-14 PROCEDURE — 02HV33Z INSERTION OF INFUSION DEVICE INTO SUPERIOR VENA CAVA, PERCUTANEOUS APPROACH: ICD-10-PCS | Performed by: FAMILY MEDICINE

## 2024-10-14 RX ORDER — LIDOCAINE WITH 8.4% SOD BICARB 0.9%(10ML)
SYRINGE (ML) INJECTION AS NEEDED
Status: COMPLETED | OUTPATIENT
Start: 2024-10-14 | End: 2024-10-14

## 2024-10-14 RX ORDER — ONDANSETRON 4 MG/1
4 TABLET, FILM COATED ORAL EVERY 8 HOURS PRN
Qty: 20 TABLET | Refills: 0 | Status: SHIPPED | OUTPATIENT
Start: 2024-10-14

## 2024-10-14 RX ORDER — DEXAMETHASONE 4 MG/1
4 TABLET ORAL 2 TIMES DAILY WITH MEALS
Qty: 30 TABLET | Refills: 0 | Status: SHIPPED | OUTPATIENT
Start: 2024-10-14 | End: 2024-10-29

## 2024-10-14 RX ADMIN — DEXAMETHASONE SODIUM PHOSPHATE 4 MG: 4 INJECTION INTRA-ARTICULAR; INTRALESIONAL; INTRAMUSCULAR; INTRAVENOUS; SOFT TISSUE at 05:29

## 2024-10-14 RX ADMIN — DEXAMETHASONE SODIUM PHOSPHATE 4 MG: 4 INJECTION INTRA-ARTICULAR; INTRALESIONAL; INTRAMUSCULAR; INTRAVENOUS; SOFT TISSUE at 11:56

## 2024-10-14 RX ADMIN — HEPARIN SODIUM 5000 UNITS: 5000 INJECTION, SOLUTION INTRAVENOUS; SUBCUTANEOUS at 05:29

## 2024-10-14 RX ADMIN — GABAPENTIN 300 MG: 300 CAPSULE ORAL at 08:13

## 2024-10-14 RX ADMIN — METOPROLOL SUCCINATE 50 MG: 50 TABLET, EXTENDED RELEASE ORAL at 08:13

## 2024-10-14 RX ADMIN — CEFAZOLIN SODIUM 2000 MG: 2 SOLUTION INTRAVENOUS at 04:12

## 2024-10-14 RX ADMIN — CEFAZOLIN SODIUM 2000 MG: 2 SOLUTION INTRAVENOUS at 08:13

## 2024-10-14 RX ADMIN — Medication 3 ML: at 12:53

## 2024-10-14 RX ADMIN — PYRIDOXINE HCL TAB 50 MG 100 MG: 50 TAB at 08:13

## 2024-10-14 RX ADMIN — LEVOTHYROXINE SODIUM 88 MCG: 88 TABLET ORAL at 05:29

## 2024-10-14 NOTE — ASSESSMENT & PLAN NOTE
The patient presented with extreme fatigue, weakness, was found to have elevated WBCs, and her Chemo-Port on left upper chest is tender, red, with some swelling.  Patient initially did vancomycin and cefepime, which transition to cefazolin per ID recommendation  Blood culture showing positive-Staph aureus.  So does the  dialysis catheter tip.  -venous duplex of upper extremity left side negative.  CT chest abdomen reviewed.  TTE negative for vegetation  C. difficile, stool enteric panel negative  Per ID, continue IV antibiotic, expecting 4 weeks cefazolin 2 g every 6 hours till 11/5/2024  S/p IR guided double lumen PICC in left upper arm for antibiotic therapy, can also use for chemotherapy.

## 2024-10-14 NOTE — NURSING NOTE
Discharge reviewed with patient. All medication from [facility] sent with patient. PICC intake.

## 2024-10-14 NOTE — PLAN OF CARE
Problem: PAIN - ADULT  Goal: Verbalizes/displays adequate comfort level or baseline comfort level  Description: Interventions:  - Encourage patient to monitor pain and request assistance  - Assess pain using appropriate pain scale  - Administer analgesics based on type and severity of pain and evaluate response  - Implement non-pharmacological measures as appropriate and evaluate response  - Consider cultural and social influences on pain and pain management  - Notify physician/advanced practitioner if interventions unsuccessful or patient reports new pain  10/13/2024 2307 by Sirisha Henry RN  Outcome: Progressing  10/13/2024 2018 by Sirisha Henry RN  Outcome: Progressing     Problem: INFECTION - ADULT  Goal: Absence or prevention of progression during hospitalization  Description: INTERVENTIONS:  - Assess and monitor for signs and symptoms of infection  - Monitor lab/diagnostic results  - Monitor all insertion sites, i.e. indwelling lines, tubes, and drains  - Monitor endotracheal if appropriate and nasal secretions for changes in amount and color  - [place] appropriate cooling/warming therapies per order  - Administer medications as ordered  - Instruct and encourage patient and family to use good hand hygiene technique  - Identify and instruct in appropriate isolation precautions for identified infection/condition  10/13/2024 2307 by Sirisha Henry RN  Outcome: Progressing  10/13/2024 2018 by Sirisha Henry RN  Outcome: Progressing     Problem: SAFETY ADULT  Goal: Patient will remain free of falls  Description: INTERVENTIONS:  - Educate patient/family on patient safety including physical limitations  - Instruct patient to call for assistance with activity   - Consult OT/PT to assist with strengthening/mobility   - Keep Call bell within reach  - Keep bed low and locked with side rails adjusted as appropriate  - Keep care items and personal belongings within reach  - Initiate and maintain comfort rounds  - Make Fall  Risk Sign visible to staff  - Apply yellow socks and bracelet for high fall risk patients  - Consider moving patient to room near nurses station  10/13/2024 2307 by Sirisha Henry RN  Outcome: Progressing  10/13/2024 2018 by Sirisha Henry RN  Outcome: Progressing  Goal: Maintain or return to baseline ADL function  Description: INTERVENTIONS:  -  Assess patient's ability to carry out ADLs; assess patient's baseline for ADL function and identify physical deficits which impact ability to perform ADLs (bathing, care of mouth/teeth, toileting, grooming, dressing, etc.)  - Assess/evaluate cause of self-care deficits   - Assess range of motion  - Assess patient's mobility; develop plan if impaired  - Assess patient's need for assistive devices and provide as appropriate  - Encourage maximum independence but intervene and supervise when necessary  - Involve family in performance of ADLs  - Assess for home care needs following discharge   - Consider OT consult to assist with ADL evaluation and planning for discharge  - Provide patient education as appropriate  10/13/2024 2307 by Sirisha Henry RN  Outcome: Progressing  10/13/2024 2018 by Sirisha Henry RN  Outcome: Progressing  Goal: Maintains/Returns to pre admission functional level  Description: INTERVENTIONS:  - Perform AM-PAC 6 Click Basic Mobility/ Daily Activity assessment daily.  - Set and communicate daily mobility goal to care team and patient/family/caregiver.   - Collaborate with rehabilitation services on mobility goals if consulted  - Record patient progress and toleration of activity level   10/13/2024 2307 by Sirisha Henry RN  Outcome: Progressing  10/13/2024 2018 by Sirisha Henry RN  Outcome: Progressing     Problem: DISCHARGE PLANNING  Goal: Discharge to home or other facility with appropriate resources  Description: INTERVENTIONS:  - Identify barriers to discharge w/patient and caregiver  - Arrange for needed discharge resources and transportation as appropriate  -  Identify discharge learning needs (meds, wound care, etc.)  - Arrange for interpretive services to assist at discharge as needed  - Refer to Case Management Department for coordinating discharge planning if the patient needs post-hospital services based on physician/advanced practitioner order or complex needs related to functional status, cognitive ability, or social support system  10/13/2024 2307 by Sirisha Henry RN  Outcome: Progressing  10/13/2024 2018 by Sirisha Henry RN  Outcome: Progressing     Problem: Knowledge Deficit  Goal: Patient/family/caregiver demonstrates understanding of disease process, treatment plan, medications, and discharge instructions  Description: Complete learning assessment and assess knowledge base.  Interventions:  - Provide teaching at level of understanding  - Provide teaching via preferred learning methods  10/13/2024 2307 by Sirisha Henry RN  Outcome: Progressing  10/13/2024 2018 by Sirisha Henry RN  Outcome: Progressing     Problem: Prexisting or High Potential for Compromised Skin Integrity  Goal: Skin integrity is maintained or improved  Description: INTERVENTIONS:  - Identify patients at risk for skin breakdown  - Assess and monitor skin integrity  - Assess and monitor nutrition and hydration status  - Monitor labs   - Assess for incontinence   - Turn and reposition patient  - Assist with mobility/ambulation  - Relieve pressure over bony prominences  - Avoid friction and shearing  - Provide appropriate hygiene as needed including keeping skin clean and dry  - Evaluate need for skin moisturizer/barrier cream  - Collaborate with interdisciplinary team   - Patient/family teaching  - Consider wound care consult   10/13/2024 2307 by Sirisha Henry RN  Outcome: Progressing  10/13/2024 2018 by Sirisha Hnery RN  Outcome: Progressing

## 2024-10-14 NOTE — TREATMENT PLAN
ID treatment plan:     Continue high-dose IV cefazolin for total 4-week course of IV antibiotics through 11/5/2024.  Repeat blood cultures remain negative.  Okay for PICC from ID standpoint.  Outpatient ID follow-up arranged.

## 2024-10-14 NOTE — OCCUPATIONAL THERAPY NOTE
Occupational Therapy Progress Note     Patient Name: Joce Tolbert  Today's Date: 10/14/2024  Problem List  Principal Problem:    Sepsis (HCC)  Active Problems:    Acute kidney injury (HCC)    Ovarian cancer (HCC)    Bacteremia due to methicillin susceptible Staphylococcus aureus (MSSA)    Metastasis to brain (HCC)    Neoplastic malignant related fatigue    Morbid obesity (HCC)    Transaminitis     10/14/24 1112   Note Type   Note Type Treatment   Pain Assessment   Pain Assessment Tool 0-10   Pain Score No Pain   ADL   Grooming Assistance 7  Independent   Grooming Comments Pt able to wash hands while standing at sink   LB Dressing Assistance 7  Independent   LB Dressing Comments Pt able to don/doff socks while seated in chair   Toileting Assistance  7  Independent   Transfers   Sit to Stand 7  Independent   Stand to Sit 7  Independent   Stand pivot 7  Independent   Additional Comments No AD   Functional Mobility   Additional Comments Pt able to complete functional mobility to/from bathroom and in hallway without AD @ I   Cognition   Overall Cognitive Status WFL   Arousal/Participation Alert;Responsive;Cooperative   Attention Within functional limits   Orientation Level Oriented X4   Memory Within functional limits   Following Commands Follows all commands and directions without difficulty   Activity Tolerance   Activity Tolerance Patient tolerated treatment well   Medical Staff Made Aware Spoke with PT [place]   Assessment   Assessment Pt completed OT tx session #1 focused on ADL performance and functional mobility. Pt alert and agreeable to participate. Pt demonstrated improvements in all ADL tasks and functional mobility this session. Pt reporting they are near their baseline function and have no concerns regarding ADLs, IADLs or functional mobility upon return to home, therefore do not require acute OT services at this time.   Plan   Treatment Interventions ADL retraining;Functional transfer training   Goal  Expiration Date 10/23/24   OT Treatment Day 1   OT Frequency 2-3x/wk   Discharge Recommendation   Rehab Resource Intensity Level, OT No post-acute rehabilitation needs   AM-PAC Daily Activity Inpatient   Lower Body Dressing 4   Bathing 4   Toileting 4   Upper Body Dressing 4   Grooming 4   Eating 4   Daily Activity Raw Score 24   Daily Activity Standardized Score (Calc for Raw Score >=11) 57.54   AM-PAC Applied Cognition Inpatient   Following a Speech/Presentation 4   Understanding Ordinary Conversation 4   Taking Medications 4   Remembering Where Things Are Placed or Put Away 4   Remembering List of 4-5 Errands 4   Taking Care of Complicated Tasks 4   Applied Cognition Raw Score 24   Applied Cognition Standardized Score 62.21   End of Consult   Patient Position at End of Consult Bedside chair;All needs within reach     The patient's raw score on the AM-PAC Daily Activity Inpatient Short Form is 24. A raw score of greater than or equal to 19 suggests the patient may benefit from discharge to post-acute rehabilitation services. Pt reporting they are near their baseline function and have no concerns regarding ADLs, IADLs or functional mobility upon return to home, therefore do not require acute OT services at this time. D/C OT effective 10/14/2024. If new concerns arise, please re-consult.    Brooke Luong OTR/L

## 2024-10-14 NOTE — PHYSICAL THERAPY NOTE
PHYSICAL THERAPY TREATMENT NOTE      NAME:  Joce Tolbert  DATE: 10/14/24    Length Of Stay: 7  Performed at least 2 patient identifiers during session: Name and Birthday        TREATMENT FLOW SHEET:       10/14/24 1113   PT Last Visit   PT Visit Date 10/14/24   Note Type   Note Type Treatment   Pain Assessment   Pain Assessment Tool 0-10   Pain Score No Pain   Restrictions/Precautions   Weight Bearing Precautions Per Order No   General   Chart Reviewed Yes   Response to Previous Treatment Patient with no complaints from previous session.   Family/Caregiver Present No   Cognition   Overall Cognitive Status WFL   Arousal/Participation Cooperative   Orientation Level Oriented X4   Transfers   Sit to Stand 7  Independent   Stand to Sit 7  Independent   Stand pivot 7  Independent   Additional Comments No AD   Ambulation/Elevation   Gait Assistance 6  Modified independent  (intermittently grasps rail in hallway out of comfort during long distances)   Assistive Device None   Distance 250 ft; 16 ft   Stair Management Assistance 6  Modified independent   Stair Management Technique   (B rails vs. single rail)   Number of Stairs 5  (can easily perform > 5 stairs mod (I))   Balance   Static Sitting Normal   Dynamic Sitting Good   Static Standing Fair +   Dynamic Standing Fair   Ambulatory Fair +   Endurance Deficit   Endurance Deficit No   Activity Tolerance   Activity Tolerance Patient tolerated treatment well   Assessment   Prognosis Good   Assessment Pt eager to return home as soon as possible.  She states that she has been independent in her room without AD. Pt ambulates around unit and navigates stairs without issue this morning.  Denies SOB, dizziness, or lightheadedness throughout.  Due to being independent, pt does not require further acute care PT services.   Barriers to Discharge None   Goals   Patient Goals go home soon   PT Treatment Day 3   Plan   Progress   (Goals met)   Discharge Recommendation   Rehab  Resource Intensity Level, PT III (Minimum Resource Intensity)  (OPPT)   Additional Comments Using cane for longer outdoor/community distances.   AM-PAC Basic Mobility Inpatient   Turning in Flat Bed Without Bedrails 4   Lying on Back to Sitting on Edge of Flat Bed Without Bedrails 4   Moving Bed to Chair 4   Standing Up From Chair Using Arms 4   Walk in Room 4   Climb 3-5 Stairs With Railing 4   Basic Mobility Inpatient Raw Score 24   Basic Mobility Standardized Score 57.68   [facility] Highest Level Of Mobility   -HLM Goal 8: Walk 250 feet or more   -HLM Achieved 8: Walk 250 feet ot more   Education   Education Provided Mobility training   Patient Demonstrates acceptance/verbal understanding   End of Consult   Patient Position at End of Consult Bedside chair;All needs within reach       The patient's AM-PAC Basic Mobility Inpatient Short Form Raw Score is 24. A Raw score of greater than 16 suggests the patient may benefit from discharge to home. Please also refer to the recommendation of the Physical Therapist for safe discharge planning.         Carlton Ricci, PT,DPT

## 2024-10-14 NOTE — ASSESSMENT & PLAN NOTE
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  At this time, no acute neurological symptoms.  Discussed with neurosurgery-May use Decadron, avoid antiepileptic at this time.  In this campus, there is no radiation oncologist available in personal credential.  Over the phone, informed patient's own, hematology/oncology, Dr. Gonzalez about the finding-per his recommendation, can be discharged with p.o. steroid and follow-up with Dr. Gonzalez for further recommendations.

## 2024-10-14 NOTE — PLAN OF CARE
Problem: PAIN - ADULT  Goal: Verbalizes/displays adequate comfort level or baseline comfort level  Description: Interventions:  - Encourage patient to monitor pain and request assistance  - Assess pain using appropriate pain scale  - Administer analgesics based on type and severity of pain and evaluate response  - Implement non-pharmacological measures as appropriate and evaluate response  - Consider cultural and social influences on pain and pain management  - Notify physician/advanced practitioner if interventions unsuccessful or patient reports new pain  Outcome: Progressing     Problem: INFECTION - ADULT  Goal: Absence or prevention of progression during hospitalization  Description: INTERVENTIONS:  - Assess and monitor for signs and symptoms of infection  - Monitor lab/diagnostic results  - Monitor all insertion sites, i.e. indwelling lines, tubes, and drains  - Monitor endotracheal if appropriate and nasal secretions for changes in amount and color  - [place] appropriate cooling/warming therapies per order  - Administer medications as ordered  - Instruct and encourage patient and family to use good hand hygiene technique  - Identify and instruct in appropriate isolation precautions for identified infection/condition  Outcome: Progressing     Problem: SAFETY ADULT  Goal: Patient will remain free of falls  Description: INTERVENTIONS:  - Educate patient/family on patient safety including physical limitations  - Instruct patient to call for assistance with activity   - Consult OT/PT to assist with strengthening/mobility   - Keep Call bell within reach  - Keep bed low and locked with side rails adjusted as appropriate  - Keep care items and personal belongings within reach  - Initiate and maintain comfort rounds  - Make Fall Risk Sign visible to staff  - Apply yellow socks and bracelet for high fall risk patients  - Consider moving patient to room near nurses station  Outcome: Progressing  Goal: Maintain or  return to baseline ADL function  Description: INTERVENTIONS:  -  Assess patient's ability to carry out ADLs; assess patient's baseline for ADL function and identify physical deficits which impact ability to perform ADLs (bathing, care of mouth/teeth, toileting, grooming, dressing, etc.)  - Assess/evaluate cause of self-care deficits   - Assess range of motion  - Assess patient's mobility; develop plan if impaired  - Assess patient's need for assistive devices and provide as appropriate  - Encourage maximum independence but intervene and supervise when necessary  - Involve family in performance of ADLs  - Assess for home care needs following discharge   - Consider OT consult to assist with ADL evaluation and planning for discharge  - Provide patient education as appropriate  Outcome: Progressing  Goal: Maintains/Returns to pre admission functional level  Description: INTERVENTIONS:  - Perform AM-PAC 6 Click Basic Mobility/ Daily Activity assessment daily.  - Set and communicate daily mobility goal to care team and patient/family/caregiver.   - Collaborate with rehabilitation services on mobility goals if consulted  - Record patient progress and toleration of activity level   Outcome: Progressing     Problem: DISCHARGE PLANNING  Goal: Discharge to home or other facility with appropriate resources  Description: INTERVENTIONS:  - Identify barriers to discharge w/patient and caregiver  - Arrange for needed discharge resources and transportation as appropriate  - Identify discharge learning needs (meds, wound care, etc.)  - Arrange for interpretive services to assist at discharge as needed  - Refer to Case Management Department for coordinating discharge planning if the patient needs post-hospital services based on physician/advanced practitioner order or complex needs related to functional status, cognitive ability, or social support system  Outcome: Progressing     Problem: Knowledge Deficit  Goal:  Patient/family/caregiver demonstrates understanding of disease process, treatment plan, medications, and discharge instructions  Description: Complete learning assessment and assess knowledge base.  Interventions:  - Provide teaching at level of understanding  - Provide teaching via preferred learning methods  Outcome: Progressing     Problem: Prexisting or High Potential for Compromised Skin Integrity  Goal: Skin integrity is maintained or improved  Description: INTERVENTIONS:  - Identify patients at risk for skin breakdown  - Assess and monitor skin integrity  - Assess and monitor nutrition and hydration status  - Monitor labs   - Assess for incontinence   - Turn and reposition patient  - Assist with mobility/ambulation  - Relieve pressure over bony prominences  - Avoid friction and shearing  - Provide appropriate hygiene as needed including keeping skin clean and dry  - Evaluate need for skin moisturizer/barrier cream  - Collaborate with interdisciplinary team   - Patient/family teaching  - Consider wound care consult   Outcome: Progressing

## 2024-10-14 NOTE — PLAN OF CARE
Problem: PAIN - ADULT  Goal: Verbalizes/displays adequate comfort level or baseline comfort level  Description: Interventions:  - Encourage patient to monitor pain and request assistance  - Assess pain using appropriate pain scale  - Administer analgesics based on type and severity of pain and evaluate response  - Implement non-pharmacological measures as appropriate and evaluate response  - Consider cultural and social influences on pain and pain management  - Notify physician/advanced practitioner if interventions unsuccessful or patient reports new pain  Outcome: Progressing     Problem: INFECTION - ADULT  Goal: Absence or prevention of progression during hospitalization  Description: INTERVENTIONS:  - Assess and monitor for signs and symptoms of infection  - Monitor lab/diagnostic results  - Monitor all insertion sites, i.e. indwelling lines, tubes, and drains  - Monitor endotracheal if appropriate and nasal secretions for changes in amount and color  - [place] appropriate cooling/warming therapies per order  - Administer medications as ordered  - Instruct and encourage patient and family to use good hand hygiene technique  - Identify and instruct in appropriate isolation precautions for identified infection/condition  Outcome: Progressing     Problem: SAFETY ADULT  Goal: Patient will remain free of falls  Description: INTERVENTIONS:  - Educate patient/family on patient safety including physical limitations  - Instruct patient to call for assistance with activity   - Consult OT/PT to assist with strengthening/mobility   - Keep Call bell within reach  - Keep bed low and locked with side rails adjusted as appropriate  - Keep care items and personal belongings within reach  - Initiate and maintain comfort rounds  - Make Fall Risk Sign visible to staff  - Offer Toileting every 2 Hours, in advance of need  - Initiate/Maintain   alarm  - Obtain necessary fall risk management equipment:     - Apply yellow socks and  bracelet for high fall risk patients  - Consider moving patient to room near nurses station  Outcome: Progressing  Goal: Maintain or return to baseline ADL function  Description: INTERVENTIONS:  -  Assess patient's ability to carry out ADLs; assess patient's baseline for ADL function and identify physical deficits which impact ability to perform ADLs (bathing, care of mouth/teeth, toileting, grooming, dressing, etc.)  - Assess/evaluate cause of self-care deficits   - Assess range of motion  - Assess patient's mobility; develop plan if impaired  - Assess patient's need for assistive devices and provide as appropriate  - Encourage maximum independence but intervene and supervise when necessary  - Involve family in performance of ADLs  - Assess for home care needs following discharge   - Consider OT consult to assist with ADL evaluation and planning for discharge  - Provide patient education as appropriate  Outcome: Progressing  Goal: Maintains/Returns to pre admission functional level  Description: INTERVENTIONS:  - Perform AM-PAC 6 Click Basic Mobility/ Daily Activity assessment daily.  - Set and communicate daily mobility goal to care team and patient/family/caregiver.   - Collaborate with rehabilitation services on mobility goals if consulted  - Perform Range of Motion 3 times a day.  - Reposition patient every 2 hours.  - Dangle patient 3 times a day  - Stand patient 3 times a day  - Ambulate patient 3 times a day  - Out of bed to chair 3 times a day   - Out of bed for meals 3 times a day  - Out of bed for toileting  - Record patient progress and toleration of activity level   Outcome: Progressing     Problem: DISCHARGE PLANNING  Goal: Discharge to home or other facility with appropriate resources  Description: INTERVENTIONS:  - Identify barriers to discharge w/patient and caregiver  - Arrange for needed discharge resources and transportation as appropriate  - Identify discharge learning needs (meds, wound care,  etc.)  - Arrange for interpretive services to assist at discharge as needed  - Refer to Case Management Department for coordinating discharge planning if the patient needs post-hospital services based on physician/advanced practitioner order or complex needs related to functional status, cognitive ability, or social support system  Outcome: Progressing     Problem: Knowledge Deficit  Goal: Patient/family/caregiver demonstrates understanding of disease process, treatment plan, medications, and discharge instructions  Description: Complete learning assessment and assess knowledge base.  Interventions:  - Provide teaching at level of understanding  - Provide teaching via preferred learning methods  Outcome: Progressing     Problem: Prexisting or High Potential for Compromised Skin Integrity  Goal: Skin integrity is maintained or improved  Description: INTERVENTIONS:  - Identify patients at risk for skin breakdown  - Assess and monitor skin integrity  - Assess and monitor nutrition and hydration status  - Monitor labs   - Assess for incontinence   - Turn and reposition patient  - Assist with mobility/ambulation  - Relieve pressure over bony prominences  - Avoid friction and shearing  - Provide appropriate hygiene as needed including keeping skin clean and dry  - Evaluate need for skin moisturizer/barrier cream  - Collaborate with interdisciplinary team   - Patient/family teaching  - Consider wound care consult   Outcome: Progressing

## 2024-10-14 NOTE — DISCHARGE SUMMARY
Discharge Summary - Hospitalist   Name: Joce Tolbert 58 y.o. female I MRN: [id number]  Unit/Bed#: -01 I Date of Admission: 10/7/2024   Date of Service: 10/14/2024 I Hospital Day: 7     Assessment & Plan  Sepsis (HCC)  The patient presented with extreme fatigue, weakness, was found to have elevated WBCs, and her Chemo-Port on left upper chest is tender, red, with some swelling.  Patient initially did vancomycin and cefepime, which transition to cefazolin per ID recommendation  Blood culture showing positive-Staph aureus.  So does the  dialysis catheter tip.  -venous duplex of upper extremity left side negative.  CT chest abdomen reviewed.  TTE negative for vegetation  C. difficile, stool enteric panel negative  Per ID, continue IV antibiotic, expecting 4 weeks cefazolin 2 g every 6 hours till 11/5/2024  S/p IR guided double lumen PICC in left upper arm for antibiotic therapy, can also use for chemotherapy.  Ovarian cancer (HCC)  The patient will continue with outpatient scheduled chemotherapy with her oncologist.  Metastasis to brain (HCC)  The patient was aware that she has a small mass with vasogenic edema on the right frontal lobe.  At this time, no acute neurological symptoms.  Discussed with neurosurgery-May use Decadron, avoid antiepileptic at this time.  In this campus, there is no radiation oncologist available in personal credential.  Over the phone, informed patient's own, hematology/oncology, Dr. Gonzalez about the finding-per his recommendation, can be discharged with p.o. steroid and follow-up with Dr. Gonzalez for further recommendations.  Neoplastic malignant related fatigue  Continue the fluid resuscitation and to treat underlying cause of fatigue which is most likely cancer and ongoing chemotherapy as well as possible sepsis.  Acute kidney injury (HCC)  Creatinine was 1.3 back in September 1724  Patient presented to ER with creatinine 1.93  Condition resolved  Bacteremia due to methicillin  susceptible Staphylococcus aureus (MSSA)  As documented in sepsis section  Morbid obesity (HCC)  Lifestyle modification  Transaminitis  Condition improved  PICC (peripherally inserted central catheter) in place  In left upper extremity     Discharging Physician / Practitioner: Nemo Bajwa MD  PCP: Ninfa Baltazar MD  Admission Date:   Admission Orders (From admission, onward)       Ordered        10/07/24 2052  INPATIENT ADMISSION  Once                          Discharge Date: 10/14/24    Medical Problems       Resolved Problems  Date Reviewed: 12/18/2021   None         Consultations During Hospital Stay:  Infectious disease, IR, neurosurgery, Patient's own hematology/oncology-Dr. Gonzalez over the phone, PT OT    Procedures Performed:   IR port Removal   Final Result by Bill Mckinnon DO (10/09 1408)      VAS upper limb venous duplex scan, unilateral/limited   Final Result by Pepe Carcamo DO (10/08 1524)      CT chest abdomen pelvis wo contrast   Final Result by Ronald Marti MD (10/08 1017)      1.  No convincing acute source of infection. There are nonspecific faint patchy scattered residual nodular opacity throughout both lungs, which may represent nonspecific infectious/inflammatory etiologies and/or scarring. However, these have dramatically    improved from prior studies in 2021.   2.  Small residual pulmonary nodules, not clearly changed from 2021.   3.  Ill-defined encasing retroperitoneal lesion, concerning for metastatic lymphadenopathy or lymphoma. Clinical correlation is necessary.   4.  Likely mild diffuse hepatic steatosis.      The study was marked in EPIC for significant notification.         Workstation performed: [id number]         MRI brain w wo contrast   Final Result by E. Alec Schoenberger, MD (10/07 8883)      1.3 cm intra-axial mass in the anterior right frontal lobe with mild surrounding vasogenic edema that likely represents metastasis from known ovarian cancer.   No other  mass or abnormal enhancement.      The study was marked in EPIC for immediate notification.      Workstation performed: [id number]         CT head without contrast   Final Result by Ike Julien DO (10/07 1100)      Within the anterior inferior right frontal lobe there is focal heterogeneity including decreased attenuation suggestive of vasogenic edema. MRI of the brain with contrast recommended. See above discussion regarding differential considerations.         I personally discussed this study with WILBER LYNN on 10/7/2024 11:00 AM.                        Resident: Magdaleno Scales I, the attending radiologist, have reviewed the images and agree with the final report above.      Workstation performed: [id number]         XR chest pa and lateral   Final Result by Aubree Tomlin MD (10/07 1025)      Low lung volumes producing vascular crowding. No definite pneumonia.            Workstation performed: [id number]         IR PICC line placement double lumen    (Results Pending)     Echo complete :    Left Ventricle: LV is normal in size and function. Estimated LVEF is 65%. No gabe wall motion abnormality noted. Wall thickness is mildly increased. Diastolic function is mildly abnormal, consistent with grade I (abnormal) relaxation.    No valvular pathology noted.  Valves were well-visualized with no evidence of bacterial vegetations.    Significant Findings / Test Results:   Lab Results   Component Value Date    WBC 14.52 (H) 10/14/2024    HGB 11.3 (L) 10/14/2024    HCT 33.7 (L) 10/14/2024    MCV 85 10/14/2024     (H) 10/14/2024     Lab Results   Component Value Date    SODIUM 140 10/14/2024    K 3.8 10/14/2024     10/14/2024    CO2 25 10/14/2024    AGAP 8 10/14/2024    BUN 29 (H) 10/14/2024    CREATININE 1.16 10/14/2024    GLUC 180 (H) 10/14/2024    CALCIUM 8.7 10/14/2024    AST 26 10/14/2024    ALT 15 10/14/2024    ALKPHOS 95 10/14/2024    TP 6.1 (L) 10/14/2024    TBILI  0.34 10/14/2024    EGFR 51 10/14/2024              Collected Updated Procedure Result Status Patient Facility Result Comment    10/09/2024 1333 10/11/2024 0834 Culture, Catheter Tip [[id number]]    (Abnormal)   Catheter Tip from CVC    Final result [facility]  Component Value   Catheter Tip Culture >100 colonies Staphylococcus aureus Abnormal     Culture considered significant only if colony count is greater than 15       Susceptibility    Staphylococcus aureus (1)    Antibiotic Interpretation Microscan  Method Status    Cefazolin ($) Susceptible <=8.00 ug/ml NANNETTE Final    Clindamycin ($) Susceptible <=0.25 ug/ml NANNETTE Final    Erythromycin ($$$$) Susceptible <=0.25 ug/ml NANNETTE Final    Gentamicin ($$) Susceptible <=4 ug/ml NANNETTE Final    Oxacillin Susceptible <=0.25 ug/ml NANNETTE Final    Penicillin Resistant >2.000 ug/ml NANNETTE Final    Tetracycline Susceptible <=4 ug/ml NANNETTE Final    Trimethoprim + Sulfamethoxazole ($$$) Susceptible <=0.5/9.5 ug/ml NANNETTE Final    Vancomycin ($) Susceptible 1.00 ug/ml NANNETTE Final          10/09/2024 1015 10/10/2024 1224 Stool Enteric Bacterial Panel by PCR [[id number]]    Stool from Per Rectum    Final result [facility] This test has been performed using the FDA-approved BD MAX system for the qualitative detection of Salmonella spp., Campylobacter spp. (jejuni and coli), Shigella spp./Enteroinvasive E. coli (EIEC), and Shiga toxin 1 (stx1)/Shiga toxin 2 (stx2) from unpreserved liquid or soft stool or Katey-Rafiq preserved stool specimens from patients with suspected acute gastroenteritis, enteritis, or colitis using polymerase chain reaction (PCR) methodology.   Negative PCR results do not preclude infection and should not be used as the sole basis for treatment or other patient management decisions.   Positive PCR results are indicative of infection, but do not necessarily indicate the presence of viable organisms and do not rule out co-infection  with other bacteria or viruses.   As with all polymerase-chain reaction methodology, extremely low levels of target below the limit of detection may be detected, but results may not be reproducible.   All positive results are reflexed to culture for confirmation and/or for susceptibility testing.   All test results should be used as an adjunct to clinical observations and other information available to the provider.    Component Value   Salmonella sp PCR Negative    Shigella sp/Enteroinvasive E. coli (EIEC) PCR Negative    Campylobacter sp (jejuni and coli) PCR Negative    Shiga toxin 1/Shiga toxin 2 genes PCR Negative           10/09/2024 0722 10/13/2024 1401 Blood culture [[id number]]   Blood from Arm, Right    Preliminary result [facility]  Component Value   Blood Culture No Growth After 4 Days. P             10/09/2024 0722 10/13/2024 1401 Blood culture [[id number]]   Blood from Hand, Right    Preliminary result [facility]  Component Value   Blood Culture No Growth After 4 Days. P             10/08/2024 1649 10/09/2024 1259 Clostridium difficile toxin by PCR with EIA [[id number]]    Stool from Per Rectum    Final result [facility] This test has been performed using either the FDA-approved BD MAX system or the FDA-approved [employer] system for the qualitative detection of Clostridioides difficile toxin B gene (tcdB) in human liquid or soft stool specimens from patients suspected of having Clostridioides difficile infection using polymerase chain reaction (PCR) methodology.   Negative PCR results do not preclude infection and should not be used as the sole basis for treatment or other patient management decisions.   Positive PCR results are indicative of colonization or infection, but do not rule out co-infection with other bacteria or viruses.   As with all polymerase-chain reaction methodology, extremely low levels of target below the  limit of detection may be detected, but results may not be reproducible.   All positive results are reflexed to a toxin A & B enzyme immunoassay (EIA) to distinguish between active infection and colonization.  Positive EIA results are indicative of an active infection.  Negative EIA results are indicative of colonization without active injection.   All test results should be used as an adjunct to clinical observations and other information available to the provider.    Component Value   C.difficile toxin by PCR Negative           10/07/2024 0957 10/10/2024 0837 Blood culture #2 [[id number]]   (Abnormal)   Blood from Arm, Right    Final result [facility]  Component Value   Blood Culture Staphylococcus aureus Abnormal     see related culture for Identification and/or Susceptibilitiy   Gram Stain Result Gram positive cocci in clusters Abnormal     This is an appended report. These results have been appended to a previously preliminary verified report.             10/07/2024 0930 10/10/2024 0837 Blood culture #1 [[id number]]    (Abnormal)   Blood from Arm, Right    Edited Result - FINAL [facility]  Component Value   Blood Culture Staphylococcus aureus Abnormal    Gram Stain Result Gram positive cocci in clusters Abnormal     Refer to Blood Culture Identification Panel results    This is an appended report. These results have been appended to a previously preliminary verified report.       Susceptibility    Staphylococcus aureus (1)    Antibiotic Interpretation Microscan  Method Status    Cefazolin ($) Susceptible <=8.00 ug/ml NANNETTE Final    Clindamycin ($) Susceptible <=0.25 ug/ml NANNETTE Final    Erythromycin ($$$$) Susceptible <=0.25 ug/ml NANNETTE Final    Gentamicin ($$) Susceptible <=4 ug/ml NANNETTE Final    Oxacillin Susceptible <=0.25 ug/ml NANNETTE Final    Penicillin Resistant >2.000 ug/ml NANNETTE Final    Tetracycline Susceptible <=4 ug/ml NANNETTE Final    Trimethoprim +  Sulfamethoxazole ($$$) Susceptible <=0.5/9.5 ug/ml NANNETTE Final    Vancomycin ($) Susceptible 1.00 ug/ml NANNETTE Final          10/07/2024 0930 10/09/2024 0829 Blood Culture Identification Panel [[id number]]    (Abnormal)   Blood from Arm, Right    Final result [facility] Film Array panel tests for 11 gram positive organisms, 15 gram negative organisms, 7 yeast species and 10 resistance genes. Component Value   Staphylococcus aureus Detected Abnormal     Probable methicillin-susceptible Staphylococcus aureus (MSSA)    Cefazolin or nafcillin recommended    ID consult highly suggested                 Incidental Findings:   As mentioned above and imaging left sections-as documented in the progress notes.    Test Results Pending at Discharge (will require follow up):   None     Outpatient Tests Requested:  CBC, CMP weekly while remain on IV antibiotic.    Complications:  none    Reason for Admission: Fatigue and chills    Hospital Course:     Joce Tolbert is a 58 y.o. female patient who originally presented to the hospital on 10/7/2024 due to sepsis secondary to Staph aureus bacteremia source was the Chemo-Port which removed by IR guided, catheter tip developed the same bacteria..  Echocardiogram no vegetation.  Patient evaluated by infectious disease, antibiotic adjusted to cefazolin 2 g every 6 hours.  Recommendation to continue antibiotic till 11/5/2024 for 4 weeks.  While patient remained on IV antibiotic, check CBC, CMP on weekly basis and follow-up with ID in 1 to 2 weeks.    S/p IR guided double lumen PICC in left upper arm for antibiotic therapy, can also use for chemotherapy.    During the hospitalization, patient also having diarrhea, C. difficile is negative panel negative.    Per imaging, patient has incidental finding on right frontal lobe lesion with vasogenic edema, consulted neurosurgery, recommending to continue Decadron and refer patient to oncology/radiation oncology.  Patient on  "oncology updated about this findings and recommending that patient can be discharged with p.o. Decadron and he will follow patient in outpatient basis.    Initially patient had DIANA, which resolved.  The treatment, patient condition significantly improved, patient has PICC placement by IR for antibiotic as well as chemotherapy.    Patient remained hemodynamically stable at this time, to be discharged home with home health aide.  Recommendation to follow-up with PCP, oncology and ID outpatient basis.    Lab results commending findings, treatment plan and option discussed in details with patient and family member.  Verbalized understanding agrees.    Please see above list of diagnoses and related plan for additional information.     Condition at Discharge: stable     Discharge Day Visit / Exam:     Subjective: Seen and evaluated during the run.  Resting comfortably denies any significant complaint.  Vitals: Blood Pressure: 137/80 (10/14/24 0735)  Pulse: 70 (10/14/24 0813)  Temperature: (!) 97.3 °F (36.3 °C) (10/14/24 0735)  Temp Source: Temporal (10/12/24 2122)  Respirations: 18 (10/14/24 0735)  Height: 5' 5\" (165.1 cm) (10/08/24 1407)  Weight - Scale: 134 kg (295 lb) (10/08/24 1407)  SpO2: 93 % (10/14/24 0735)  Exam:   Physical Exam  Vitals and nursing note reviewed.   Constitutional:       Appearance: Normal appearance. She is obese. She is not ill-appearing or diaphoretic.   Eyes:      General: No scleral icterus.        Left eye: No discharge.      Extraocular Movements: Extraocular movements intact.      Conjunctiva/sclera: Conjunctivae normal.      Pupils: Pupils are equal, round, and reactive to light.   Cardiovascular:      Rate and Rhythm: Normal rate.      Heart sounds: No murmur heard.     No friction rub. No gallop.   Pulmonary:      Effort: Pulmonary effort is normal. No respiratory distress.      Breath sounds: No stridor. No wheezing or rhonchi.   Musculoskeletal:      Right lower leg: No edema.      " Left lower leg: No edema.   Neurological:      Mental Status: She is alert and oriented to person, place, and time.      Cranial Nerves: No cranial nerve deficit.      Sensory: No sensory deficit.      Motor: No weakness.      Coordination: Coordination normal.         Discussion with Family: Daughter    Discharge instructions/Information to patient and family:   See after visit summary for information provided to patient and family.      Provisions for Follow-Up Care:  See after visit summary for information related to follow-up care and any pertinent home health orders.      Disposition:     Home with VNA Services (Reminder: Complete face to face encounter)    For Discharges to [facility] SNF:   Not Applicable to this Patient - Not Applicable to this Patient    Planned Readmission: If condition get worse     Discharge Statement:  Greater than 50% of the total time was spent examining patient, answering all patient questions, arranging and discussing plan of care with patient as well as directly providing post-discharge instructions.  Additional time then spent on discharge activities.    Discharge Medications:  See after visit summary for reconciled discharge medications provided to patient and family.      ** Please Note: This note has been constructed using a voice recognition system **

## 2024-10-14 NOTE — PLAN OF CARE
Problem: OCCUPATIONAL THERAPY ADULT  Goal: Performs self-care activities at highest level of function for planned discharge setting.  See evaluation for individualized goals.  Description: Treatment Interventions: ADL retraining, Functional transfer training          See flowsheet documentation for full assessment, interventions and recommendations.   10/14/2024 1136 by Brooke Luong OT  Outcome: Adequate for Discharge  Note: Limitation: Decreased ADL status, Decreased UE strength, Decreased endurance, Decreased high-level ADLs  Prognosis: Good  Assessment: Pt completed OT tx session #1 focused on ADL performance and functional mobility. Pt alert and agreeable to participate. Pt demonstrated improvements in all ADL tasks and functional mobility this session. Pt reporting they are near their baseline function and have no concerns regarding ADLs, IADLs or functional mobility upon return to home, therefore do not require acute OT services at this time.     Rehab Resource Intensity Level, OT: No post-acute rehabilitation needs

## 2024-10-14 NOTE — PROGRESS NOTES
Pt reports decreased appetite and altered sense of taste due to chemo treatments, all foods/beverages taste metallic. Reports at baseline eats 2 meals per day. Previously was drinking nutrition supplements but now the sight of them would make her vomit. Chart review of weight hx: 8/11/23 311lb, 3/28/24 279lb, 9/19/24 297lb. Pt reports UBW 297lb prior to starting chemo.     Will liberalize to regular 4gm GURU to promote adequate intake/increase options given prolonged hospital stay and altered sense of taste.   Will order plastic utensils to help decrease metallic taste at meals, pt agreeable.

## 2024-10-14 NOTE — CASE MANAGEMENT
Case Management Discharge Planning Note    Patient name Joce Tolbert  Location /-01 MRN [id number]  : 1965 Date 10/14/2024       Current Admission Date: 10/7/2024  Current Admission Diagnosis:Sepsis (HCC)   Patient Active Problem List    Diagnosis Date Noted Date Diagnosed    Morbid obesity (HCC) 10/08/2024     Transaminitis 10/08/2024     Metastasis to brain (HCC) 10/07/2024     Neoplastic malignant related fatigue 10/07/2024     Bacteremia due to methicillin susceptible Staphylococcus aureus (MSSA) 12/15/2021     Acute respiratory failure with hypoxia (HCC) 2021     Acute kidney injury (HCC) 2021     Hyponatremia 2021     Sepsis (HCC) 2021     Diabetes mellitus (HCC)      Hypertension      Hypercholesterolemia      Ovarian cancer (HCC)      Hypothyroidism        LOS (days): 7  Geometric Mean LOS (GMLOS) (days): 4.9  Days to GMLOS:-1.8     OBJECTIVE:  Risk of Unplanned Readmission Score: 18.6         Current admission status: Inpatient   [facility] Pharm[facility]   [id number] [address] [address]  Phone: [phone] Fax: [phone]    Primary Care Provider: Ninfa Baltazar MD    [facility] [employer] [facility]  Secondary Insurance:     DISCHARGE DETAILS:     Pt is cleared for DC today. IV Abx were dropped off at bedside for Pt this morning. RN aware. Pt confirmed that she would have someone to pick her up at 1400 after picc line is placed    CM notified [facility] that Pt will have a ride at 1400 and will be home by 1500 to meet the [facility] nurse.     CM sent referral to [facility] for senior assessment/additional supports for Pt in the home.      CM will continue to follow for discharge planning needs.     UPDATE: 1326- GILMAR received confirmation from [facility] that referral was received.

## 2024-10-14 NOTE — INCIDENTAL FINDINGS
The following findings require follow up:  Radiographic finding   Finding: CT head without contrast: Within the anterior inferior right frontal lobe there is focal heterogeneity including decreased attenuation suggestive of vasogenic edema. MRI of the brain with contrast recommended. See above discussion regarding differential considerations., I personally discussed this study with WILBER LYNN on 10/7/2024 11:00 AM., Resident: VIVIAN Tran, the attending radiologist, have reviewed the images and agree with the final report above., Workstation performed: [id number]   MRI brain w wo contrast: 1.3 cm intra-axial mass in the anterior right frontal lobe with mild surrounding vasogenic edema that likely represents metastasis from known ovarian cancer., No other mass or abnormal enhancement., The study was marked in EPIC for immediate notification., Workstation performed: [id number]   CT chest abdomen pelvis wo contrast: 1.  No convincing acute source of infection. There are nonspecific faint patchy scattered residual nodular opacity throughout both lungs, which may represent nonspecific infectious/inflammatory etiologies and/or scarring. However, these have dramatically,  improved from prior studies in 2021., 2.  Small residual pulmonary nodules, not clearly changed from 2021., 3.  Ill-defined encasing retroperitoneal lesion, concerning for metastatic lymphadenopathy or lymphoma. Clinical correlation is necessary., 4.  Likely mild diffuse hepatic steatosis.,   Follow up required: yes   Follow up should be done within 1 week(s)    Please notify the following clinician to assist with the follow up:   Dr. Ninfa Baltazar MD PCP - General Family Medicine [phone] [address] [address]          Jatin Gonzalez  Hematology and Oncology Hematology Oncology [phone] [facility] Hem/[facility] [address] [facility] [address]         Erica  MD Jose  Infectious Diseases [phone] [address] [facility] [address]            Incidental finding results were discussed with the Patient and Patient's POA by Nemo Bajwa MD on 10/14/24.   They expressed understanding and all questions answered.

## 2024-10-14 NOTE — PROCEDURES
Venous Access Line Insertion    Date/Time: 10/14/2024 12:48 PM    Performed by: Isaias Madrigal MD  Authorized by: Nemo Bajwa MD    Patient location:  IR  Other Assisting Provider: Yes (comment) (Nicole Gracia RTR VI)    Consent:     Consent obtained:  Written    Consent given by:  Patient    Risks discussed:  Arterial puncture, bleeding, infection, nerve damage, pneumothorax and incorrect placement    Alternatives discussed:  No treatment  Universal protocol:     Procedure explained and questions answered to patient or proxy's satisfaction: yes      Immediately prior to procedure, a time out was called: yes      Relevant documents present and verified: yes      Test results available and properly labeled: yes      Radiology Images displayed and confirmed.  If images not available, report reviewed: yes      Required blood products, implants, devices, and special equipment available: yes      Site/side marked: yes      Patient identity confirmed:  Verbally with patient and arm band  Pre-procedure details:     Hand hygiene: Hand hygiene performed prior to insertion      Sterile barrier technique: All elements of maximal sterile technique followed      Skin preparation:  ChloraPrep    Skin preparation agent: Skin preparation agent completely dried prior to procedure    Procedure details:     Complex Venous Access Line Type: PICC      Complex Venous Access Line Indications: chemotherapy and long term antibiotics      Catheter tip vessel location: superior vena cava      Orientation:  Left    Location:  Basilic    Procedural supplies:  Double lumen    Catheter size:  5 Fr    Total catheter length (cm):  48    Catheter out on skin (cm):  2    Max flow rate:  999    Arm circumference:  35.5    Patient evaluated for contraindications to access (i.e. fistula, thrombosis, etc): Yes      Site selection rationale:  Existing midline    Approach comment:  Conversion from existing midline    Patient position:  Flat     Ultrasound image availability:  Images available in PACS    Number of attempts:  1    Successful placement: yes      Landmarks identified: yes    Anesthesia (see MAR for exact dosages):     Anesthesia method:  Local infiltration    Local anesthetic:  Lidocaine 1% w/o epi    Sedation type:  Anxiolysis  Post-procedure details:     Post-procedure:  Securement device placed and dressing applied    Assessment:  Blood return through all ports, free fluid flow, no pneumothorax on x-ray and placement verified by x-ray    Post-procedure complications: none      Patient tolerance of procedure:  Tolerated well, no immediate complications    Observer: Yes      Observer name:  Maral Loomis RN

## 2024-10-15 DIAGNOSIS — R78.81 BACTEREMIA DUE TO METHICILLIN SUSCEPTIBLE STAPHYLOCOCCUS AUREUS (MSSA): Primary | ICD-10-CM

## 2024-10-15 DIAGNOSIS — B95.61 BACTEREMIA DUE TO METHICILLIN SUSCEPTIBLE STAPHYLOCOCCUS AUREUS (MSSA): Primary | ICD-10-CM

## 2024-10-15 NOTE — PROGRESS NOTES
OPAT NOTE    AP ONLY CAMPUSES ARE: [facility], [facility], and [facility].   In these cases, physician is only cosigning notes.    Supervising/Discharge provider: Dr. Garcia    Diagnosis:   MSSA Bacteremia,   Possible catheter related infection    Drug: Cefazolin 2g IV q6h    Labs/Frequency: Weekly CBCd, CMP    End Date: 11/05/2024    Infusion/VNA/SNF contact:  [facility]  Phone: [phone]  Fax: [phone]    [facility]    Next appointment: 10/23/2024    RN assigned: Mario Wright

## 2024-10-15 NOTE — UTILIZATION REVIEW
NOTIFICATION OF ADMISSION DISCHARGE   This is a Notification of Discharge from [facility]. Please be advised that this patient has been discharge from our facility. Below you will find the admission and discharge date and time including the patient’s disposition.   UTILIZATION REVIEW CONTACT:  Juju Corral  Utilization   Network Utilization Review Department  Phone: [phone] x carefully listen to the prompts. All voicemails are confidential.  Email: [email or web address]     ADMISSION INFORMATION  PRESENTATION DATE: 10/7/2024  8:44 AM  OBERVATION ADMISSION DATE: N/A  INPATIENT ADMISSION DATE: 10/7/24  8:52 PM   DISCHARGE DATE: 10/14/2024  2:32 PM   DISPOSITION:Home with Home Health Care    Network Utilization Review Department  ATTENTION: Please call with any questions or concerns to [phone] and carefully listen to the prompts so that you are directed to the right person. All voicemails are confidential.   For Discharge needs, contact Care Management DC Support Team at [phone] opt. 2  Send all requests for admission clinical reviews, approved or denied determinations and any other requests to dedicated fax number below belonging to the campus where the patient is receiving treatment. List of dedicated fax numbers for the Facilities:  FACILITY NAME UR FAX NUMBER   ADMISSION DENIALS (Administrative/Medical Necessity) [phone]   DISCHARGE SUPPORT TEAM ([facility]) [phone]   PARENT CHILD HEALTH (Maternity/NICU/Pediatrics) [phone]   [facility] [phone]   [facility] [phone]   [facility] [phone]   [facility] [phone]   [facility] [phone]   [facility] [phone]   [facility] [phone]   [facility]  [facility] [phone]   [facility] [phone]   [facility] [phone]   [facility] [phone]   [facility] [phone]

## 2024-10-23 ENCOUNTER — TELEPHONE (OUTPATIENT)
Dept: INFECTIOUS DISEASES | Facility: CLINIC | Age: 59
End: 2024-10-23

## 2024-10-23 ENCOUNTER — TELEMEDICINE (OUTPATIENT)
Dept: INFECTIOUS DISEASES | Facility: CLINIC | Age: 59
End: 2024-10-23
Payer: COMMERCIAL

## 2024-10-23 DIAGNOSIS — C79.31 METASTASIS TO BRAIN (HCC): ICD-10-CM

## 2024-10-23 DIAGNOSIS — E08.01 DIABETES MELLITUS DUE TO UNDERLYING CONDITION WITH HYPEROSMOLAR COMA, UNSPECIFIED WHETHER LONG TERM INSULIN USE (HCC): ICD-10-CM

## 2024-10-23 DIAGNOSIS — C56.9 MALIGNANT NEOPLASM OF OVARY, UNSPECIFIED LATERALITY (HCC): Primary | ICD-10-CM

## 2024-10-23 DIAGNOSIS — Z45.2 PICC (PERIPHERALLY INSERTED CENTRAL CATHETER) IN PLACE: ICD-10-CM

## 2024-10-23 DIAGNOSIS — B95.61 BACTEREMIA DUE TO METHICILLIN SUSCEPTIBLE STAPHYLOCOCCUS AUREUS (MSSA): ICD-10-CM

## 2024-10-23 DIAGNOSIS — R78.81 BACTEREMIA DUE TO METHICILLIN SUSCEPTIBLE STAPHYLOCOCCUS AUREUS (MSSA): ICD-10-CM

## 2024-10-23 DIAGNOSIS — E66.01 MORBID OBESITY (HCC): ICD-10-CM

## 2024-10-23 DIAGNOSIS — E87.6 LOW SERUM POTASSIUM: ICD-10-CM

## 2024-10-23 PROCEDURE — 99214 OFFICE O/P EST MOD 30 MIN: CPT | Performed by: PHYSICIAN ASSISTANT

## 2024-10-23 RX ORDER — PREDNISONE 1 MG/1
4 TABLET ORAL 2 TIMES DAILY WITH MEALS
COMMUNITY

## 2024-10-23 NOTE — ASSESSMENT & PLAN NOTE
S/p chemo, LEIDA-BSO in 2021. Now with recurrent disease, retroperitoneal lymphadenopathy. Port placed 9/16, received first treatment of paclitaxel/carboplatin on 9/24.

## 2024-10-23 NOTE — ASSESSMENT & PLAN NOTE
MSSA bacteremia.  Patient noted to have both sets of blood cultures positive for MSSA.  Likely source was infected PORT given local signs of infection and positive catheter tip culture.  TTE was without vegetation.  No indwelling vascular or prosthetic devices.  Duplex without acute DVT, has chronic RIJ thrombus. CT chest/abd without septic pulmonary emboli or other infectious focus. Has a newly detected R frontal lobe enhancing mass but on MRI appears more consistent with mets rather than septic cerebral emboli.  Patient discharged to complete a 4 week course of IV cefazolin     Patient doing well on the cefazolin.  Labs stable.  Note a slightly low K, will notify PCP.  PICC functioning well.     Plan  - continue cefazolin 2 grams IV every 6 hours as ordered though 11/5/24 to complete a total of 4 weeks.   - continue weekly CBCd, Cr while on IV cefazolin  - will reach out to Oncology regarding PICC line as documented in chart PICC to possibly stay in place for chemo.  If PICC to stay in place will transition care of PICC to Oncology as of 11/6.  If they do not want PICC to stay in place then will remove it on 11/6.    - no further ID follow up scheduled at this time    Orders:    Ambulatory referral to Infectious Disease

## 2024-10-23 NOTE — TELEPHONE ENCOUNTER
-Called and spoke with nurse Kate regarding this patient. Discussed with Lavinia the possibility of assuming care of the patients PICC line after completion of her IV antibiotics to administer her chemotherapy medication since the patients port was infected. Lavinia states that they do not need a central line to administer this patients chemotherapy and maintaining the PICC line is not necessary. PICC line will be removed after the patient completes her IV antibiotic course.

## 2024-10-23 NOTE — TELEPHONE ENCOUNTER
-Called and spoke with nurse Ruiz regarding this patients reported potassium of 3.3. She states she will notify the provider. She verbalizes understanding and has no further questions at this time.

## 2024-10-23 NOTE — PATIENT INSTRUCTIONS
- continue cefazolin 2 grams IV every 6 hours through 11/5  - continue weekly labs as ordered  - no further ID follow up scheduled at this time  - please call with any concerns

## 2024-10-23 NOTE — PROGRESS NOTES
Ambulatory Visit  Name: Joce Tolbert      : 1965      MRN: [id number]  Encounter Provider: Faizan Lafleur PA-C  Encounter Date: 10/23/2024   Encounter department: [facility]    Assessment & Plan  Bacteremia due to methicillin susceptible Staphylococcus aureus (MSSA)  MSSA bacteremia.  Patient noted to have both sets of blood cultures positive for MSSA.  Likely source was infected PORT given local signs of infection and positive catheter tip culture.  TTE was without vegetation.  No indwelling vascular or prosthetic devices.  Duplex without acute DVT, has chronic RIJ thrombus. CT chest/abd without septic pulmonary emboli or other infectious focus. Has a newly detected R frontal lobe enhancing mass but on MRI appears more consistent with mets rather than septic cerebral emboli.  Patient discharged to complete a 4 week course of IV cefazolin     Patient doing well on the cefazolin.  Labs stable.  Note a slightly low K, will notify PCP.  PICC functioning well.     Plan  - continue cefazolin 2 grams IV every 6 hours as ordered though 24 to complete a total of 4 weeks.   - continue weekly CBCd, Cr while on IV cefazolin  - will reach out to Oncology regarding PICC line as documented in chart PICC to possibly stay in place for chemo.  If PICC to stay in place will transition care of PICC to Oncology as of .  If they do not want PICC to stay in place then will remove it on .    - no further ID follow up scheduled at this time    Orders:    Ambulatory referral to Infectious Disease    PICC (peripherally inserted central catheter) in place    Orders:    Ambulatory referral to Infectious Disease    Malignant neoplasm of ovary, unspecified laterality (HCC)  S/p chemo, LEIDA-BSO in . Now with recurrent disease, retroperitoneal lymphadenopathy. Port placed , received first treatment of paclitaxel/carboplatin on .          Metastasis to brain (HCC)  Noted on  imaging         Diabetes mellitus due to underlying condition with hyperosmolar coma, unspecified whether long term insulin use (HCC)    Lab Results   Component Value Date    HGBA1C 6.3 (H) 03/28/2024            Morbid obesity (HCC)  BMI 49.09         Low serum potassium  K noted at 3.3.  Per patient she required supplementation while in the hospital .  Will notify her PCP of result.           History of Present Illness     Joce Tolbert is a 58 y.o. female who presents for virtual follow up today regarding MSSA bacteremia.  Patient noted to have both sets of blood cultures positive for MSSA.  Likely source was infected PORT given local signs of infection and positive catheter tip culture.  TTE was without vegetation.  No indwelling vascular or prosthetic devices.  Duplex without acute DVT, has chronic RIJ thrombus. CT chest/abd without septic pulmonary emboli or other infectious focus. Has a newly detected R frontal lobe enhancing mass but on MRI appears more consistent with mets rather than septic cerebral emboli.  Patient discharged to complete a 4 week course of IV cefazolin.  She is overall doing well.  She has no new complaints.  No issues with the cefazolin.  PICC working well.       Review of Systems   Constitutional:  Negative for chills and fever.   Respiratory:  Negative for cough and shortness of breath.    Gastrointestinal:  Negative for abdominal pain, diarrhea, nausea and vomiting.   Skin:  Negative for rash.   Psychiatric/Behavioral:  Negative for behavioral problems and confusion.            Objective     There were no vitals taken for this visit.    Physical Exam  Constitutional:       General: She is not in acute distress.     Appearance: Normal appearance. She is not ill-appearing, toxic-appearing or diaphoretic.   HENT:      Head: Normocephalic and atraumatic.   Eyes:      General: No scleral icterus.        Right eye: No discharge.         Left eye: No discharge.      Conjunctiva/sclera:  Conjunctivae normal.   Pulmonary:      Effort: Pulmonary effort is normal. No respiratory distress.   Skin:     Coloration: Skin is not jaundiced or pale.      Findings: No erythema or rash.      Comments: PICC site without erythema or drainage   Neurological:      Mental Status: She is alert and oriented to person, place, and time.   Psychiatric:         Mood and Affect: Mood normal.         Behavior: Behavior normal.           Labs:   10/21/24  Wbc: 9.4  Hgb: 12.4  Plt: 190  Cr: 0.96  K: 3.3    Telemedicine consent    Patient: Joce Tolbert  Provider: Faizan Lafleur PA-C  Provider located at [facility]  [address] [address] [phone]    The patient was identified by name and date of birth. Joce Tolbert was informed that this is a telemedicine visit and that the visit is being conducted through the [employer] platform. She agrees to proceed..  My office door was closed. No one else was in the room.  She acknowledged consent and understanding of privacy and security of the video platform. The patient has agreed to participate and understands they can discontinue the visit at any time.    Patient is aware this is a billable service.     I spent 10 minutes with the patient during this visit.   Additional time spent on chart/lab review, documentation and order placement.

## 2024-10-23 NOTE — ASSESSMENT & PLAN NOTE
K noted at 3.3.  Per patient she required supplementation while in the hospital .  Will notify her PCP of result.

## 2024-11-07 PROBLEM — A41.9 SEPSIS (HCC): Status: RESOLVED | Noted: 2021-12-13 | Resolved: 2024-11-07

## 2025-04-11 NOTE — ASSESSMENT & PLAN NOTE
Acute mild elevation in ALT, AST and ALP. Possibly secondary to sepsis and/or chemotherapy related. Also has underlying hepatic steatosis. Resolved     Medication: glipiZIDE (GLUCOTROL) 5 MG tablet  Last office visit date: 01/25/2025  Medication Refill Protocol Failed.  Failed criteria: eGFR lower than 29 in the last 12 months. Sent to clinician to review.

## 2025-05-04 ENCOUNTER — HOSPITAL ENCOUNTER (EMERGENCY)
Facility: HOSPITAL | Age: 60
Discharge: HOME | End: 2025-05-05
Attending: EMERGENCY MEDICINE
Payer: COMMERCIAL

## 2025-05-04 ENCOUNTER — APPOINTMENT (EMERGENCY)
Dept: CT IMAGING | Facility: HOSPITAL | Age: 60
End: 2025-05-04
Payer: COMMERCIAL

## 2025-05-04 DIAGNOSIS — K59.03 DRUG-INDUCED CONSTIPATION: Primary | ICD-10-CM

## 2025-05-04 LAB
ALBUMIN SERPL BCG-MCNC: 4.3 G/DL (ref 3.5–5)
ALP SERPL-CCNC: 75 U/L (ref 34–104)
ALT SERPL W P-5'-P-CCNC: 22 U/L (ref 7–52)
ANION GAP SERPL CALCULATED.3IONS-SCNC: 10 MMOL/L (ref 4–13)
AST SERPL W P-5'-P-CCNC: 24 U/L (ref 13–39)
BASOPHILS # BLD AUTO: 0.09 THOUSANDS/ÂΜL (ref 0–0.1)
BASOPHILS NFR BLD AUTO: 1 % (ref 0–1)
BILIRUB SERPL-MCNC: 0.05 MG/DL (ref 0.2–1)
BUN SERPL-MCNC: 21 MG/DL (ref 5–25)
CALCIUM SERPL-MCNC: 10.2 MG/DL (ref 8.4–10.2)
CHLORIDE SERPL-SCNC: 101 MMOL/L (ref 96–108)
CO2 SERPL-SCNC: 29 MMOL/L (ref 21–32)
CREAT SERPL-MCNC: 1.71 MG/DL (ref 0.6–1.3)
EOSINOPHIL # BLD AUTO: 0.22 THOUSAND/ÂΜL (ref 0–0.61)
EOSINOPHIL NFR BLD AUTO: 2 % (ref 0–6)
ERYTHROCYTE [DISTWIDTH] IN BLOOD BY AUTOMATED COUNT: 13.6 % (ref 11.6–15.1)
GFR SERPL CREATININE-BSD FRML MDRD: 31 ML/MIN/1.73SQ M
GLUCOSE SERPL-MCNC: 143 MG/DL (ref 65–140)
HCT VFR BLD AUTO: 41.1 % (ref 34.8–46.1)
HGB BLD-MCNC: 13.4 G/DL (ref 11.5–15.4)
IMM GRANULOCYTES # BLD AUTO: 0.04 THOUSAND/UL (ref 0–0.2)
IMM GRANULOCYTES NFR BLD AUTO: 0 % (ref 0–2)
LACTATE SERPL-SCNC: 1.7 MMOL/L (ref 0.5–2)
LIPASE SERPL-CCNC: 12 U/L (ref 11–82)
LYMPHOCYTES # BLD AUTO: 1.97 THOUSANDS/ÂΜL (ref 0.6–4.47)
LYMPHOCYTES NFR BLD AUTO: 19 % (ref 14–44)
MCH RBC QN AUTO: 31.2 PG (ref 26.8–34.3)
MCHC RBC AUTO-ENTMCNC: 32.6 G/DL (ref 31.4–37.4)
MCV RBC AUTO: 96 FL (ref 82–98)
MONOCYTES # BLD AUTO: 0.77 THOUSAND/ÂΜL (ref 0.17–1.22)
MONOCYTES NFR BLD AUTO: 7 % (ref 4–12)
NEUTROPHILS # BLD AUTO: 7.55 THOUSANDS/ÂΜL (ref 1.85–7.62)
NEUTS SEG NFR BLD AUTO: 71 % (ref 43–75)
NRBC BLD AUTO-RTO: 0 /100 WBCS
PLATELET # BLD AUTO: 270 THOUSANDS/UL (ref 149–390)
PMV BLD AUTO: 9.7 FL (ref 8.9–12.7)
POTASSIUM SERPL-SCNC: 3.8 MMOL/L (ref 3.5–5.3)
PROT SERPL-MCNC: 7.7 G/DL (ref 6.4–8.4)
RBC # BLD AUTO: 4.29 MILLION/UL (ref 3.81–5.12)
SODIUM SERPL-SCNC: 140 MMOL/L (ref 135–147)
WBC # BLD AUTO: 10.64 THOUSAND/UL (ref 4.31–10.16)

## 2025-05-04 PROCEDURE — 99284 EMERGENCY DEPT VISIT MOD MDM: CPT | Performed by: EMERGENCY MEDICINE

## 2025-05-04 PROCEDURE — 83690 ASSAY OF LIPASE: CPT | Performed by: EMERGENCY MEDICINE

## 2025-05-04 PROCEDURE — 83605 ASSAY OF LACTIC ACID: CPT | Performed by: EMERGENCY MEDICINE

## 2025-05-04 PROCEDURE — 96374 THER/PROPH/DIAG INJ IV PUSH: CPT

## 2025-05-04 PROCEDURE — 96375 TX/PRO/DX INJ NEW DRUG ADDON: CPT

## 2025-05-04 PROCEDURE — 96361 HYDRATE IV INFUSION ADD-ON: CPT

## 2025-05-04 PROCEDURE — 36415 COLL VENOUS BLD VENIPUNCTURE: CPT | Performed by: EMERGENCY MEDICINE

## 2025-05-04 PROCEDURE — 74176 CT ABD & PELVIS W/O CONTRAST: CPT

## 2025-05-04 PROCEDURE — 80053 COMPREHEN METABOLIC PANEL: CPT | Performed by: EMERGENCY MEDICINE

## 2025-05-04 PROCEDURE — 99285 EMERGENCY DEPT VISIT HI MDM: CPT

## 2025-05-04 PROCEDURE — 85025 COMPLETE CBC W/AUTO DIFF WBC: CPT | Performed by: EMERGENCY MEDICINE

## 2025-05-04 RX ORDER — METOCLOPRAMIDE HYDROCHLORIDE 5 MG/ML
10 INJECTION INTRAMUSCULAR; INTRAVENOUS ONCE
Status: COMPLETED | OUTPATIENT
Start: 2025-05-04 | End: 2025-05-04

## 2025-05-04 RX ORDER — HYDROMORPHONE HCL IN WATER/PF 6 MG/30 ML
0.2 PATIENT CONTROLLED ANALGESIA SYRINGE INTRAVENOUS ONCE
Status: COMPLETED | OUTPATIENT
Start: 2025-05-04 | End: 2025-05-04

## 2025-05-04 RX ADMIN — SODIUM CHLORIDE 1000 ML: 0.9 INJECTION, SOLUTION INTRAVENOUS at 22:10

## 2025-05-04 RX ADMIN — HYDROMORPHONE HYDROCHLORIDE 0.2 MG: 0.2 INJECTION, SOLUTION INTRAMUSCULAR; INTRAVENOUS; SUBCUTANEOUS at 22:55

## 2025-05-04 RX ADMIN — METOCLOPRAMIDE HYDROCHLORIDE 10 MG: 5 INJECTION INTRAMUSCULAR; INTRAVENOUS at 22:10

## 2025-05-05 VITALS
RESPIRATION RATE: 18 BRPM | TEMPERATURE: 97.8 F | SYSTOLIC BLOOD PRESSURE: 128 MMHG | HEART RATE: 77 BPM | DIASTOLIC BLOOD PRESSURE: 72 MMHG | OXYGEN SATURATION: 94 %

## 2025-05-05 RX ORDER — POLYETHYLENE GLYCOL 3350 17 G/17G
17 POWDER, FOR SOLUTION ORAL DAILY
Qty: 170 G | Refills: 0 | Status: SHIPPED | OUTPATIENT
Start: 2025-05-05 | End: 2025-05-15

## 2025-05-05 NOTE — ED PROVIDER NOTES
Time reflects when diagnosis was documented in both MDM as applicable and the Disposition within this note       Time User Action Codes Description Comment    5/5/2025 12:47 AM Mery Box Add [K59.03] Drug-induced constipation           ED Disposition       ED Disposition   Discharge    Condition   Stable    Date/Time   Mon May 5, 2025 12:47 AM    Comment   Joce Tolbert discharge to home/self care.                   Assessment & Plan       Medical Decision Making  Abdominal exam without peritoneal signs.  No evidence of acute abdomen at this time.  Well-appearing.  Given work-up, low suspicion for acute hepatobiliary disease (including acute cholecystitis or cholangitis), acute pancreatitis (negative lipase), PUD (including gastric perforation), acute infectious processes (pneumonia, hepatitis, pyelonephritis), acute appendicitis, vascular catastrophe, bowel obstruction, viscus perforation, ovarian/testicular torsion, or diverticulitis.  Presentation not consistent with other acute emergent causes of abdominal pain at this time.    Problems Addressed:  Drug-induced constipation: acute illness or injury    Amount and/or Complexity of Data Reviewed  Labs: ordered. Decision-making details documented in ED Course.  Radiology: ordered. Decision-making details documented in ED Course.    Risk  OTC drugs.  Prescription drug management.        ED Course as of 05/05/25 0136   Sun May 04, 2025   2330 Lipase   2331 Comprehensive metabolic panel(!)   2331 Lactic acid, plasma (w/reflex if result > 2.0)   2331 CBC and differential(!)   Mon May 05, 2025   0135 Patient had a large bowel movement after soapsuds enema, reports feeling much better, advise close follow-up with PCP and oncology, return if symptoms worsen       Medications   sodium chloride 0.9 % bolus 1,000 mL (0 mL Intravenous Stopped 5/4/25 2310)   metoclopramide (REGLAN) injection 10 mg (10 mg Intravenous Given 5/4/25 2210)   HYDROmorphone HCl (DILAUDID)  injection 0.2 mg (0.2 mg Intravenous Given 5/4/25 5222)       ED Risk Strat Scores                    No data recorded                            History of Present Illness       Chief Complaint   Patient presents with    Constipation     Pt reports constipation x5 days with nausea and weakness       Past Medical History:   Diagnosis Date    Diabetes mellitus (HCC)     Hypercholesterolemia     Hypertension       Past Surgical History:   Procedure Laterality Date    IR PICC PLACEMENT DOUBLE LUMEN  10/14/2024    IR PORT REMOVAL  10/9/2024      History reviewed. No pertinent family history.   Social History     Tobacco Use    Smoking status: Never    Smokeless tobacco: Never   Substance Use Topics    Alcohol use: Not Currently    Drug use: Never      E-Cigarette/Vaping      E-Cigarette/Vaping Substances      I have reviewed and agree with the history as documented.     Patient is a 59-year-old female with a history of ovarian cancer, recently started a new chemotherapy drug, Rubraca, has been unable to have a bowel movement for the past 5 days or so, reports abdominal distention, discomfort, rectal discomfort, nausea, no vomiting, no difficulty with urination, she has tried several OTC medications for her symptoms with no relief        Review of Systems   Constitutional: Negative.    HENT: Negative.     Eyes: Negative.    Respiratory: Negative.     Cardiovascular: Negative.    Gastrointestinal:  Positive for abdominal distention, abdominal pain, constipation and nausea.   Endocrine: Negative.    Genitourinary: Negative.    Musculoskeletal: Negative.    Skin: Negative.    Allergic/Immunologic: Negative.    Neurological: Negative.    Hematological: Negative.    Psychiatric/Behavioral: Negative.             Objective       ED Triage Vitals   Temperature Pulse Blood Pressure Respirations SpO2 Patient Position - Orthostatic VS   05/04/25 2155 05/04/25 2129 05/04/25 2129 05/04/25 2129 05/04/25 2129 05/04/25 2129   97.8 °F  (36.6 °C) 100 168/99 20 92 % Sitting      Temp Source Heart Rate Source BP Location FiO2 (%) Pain Score    05/04/25 2155 05/04/25 2129 05/04/25 2129 -- 05/04/25 2129    Temporal Monitor Left arm  6      Vitals      Date and Time Temp Pulse SpO2 Resp BP Pain Score FACES Pain Rating User   05/05/25 0045 -- 77 94 % -- 128/72 -- -- BA   05/04/25 2345 -- 78 98 % -- 131/83 -- -- BA   05/04/25 2330 -- 78 92 % -- 129/78 -- -- BA   05/04/25 2255 -- -- -- -- -- 4 -- AL   05/04/25 2215 -- 93 93 % 18 113/66 -- -- AL   05/04/25 2155 97.8 °F (36.6 °C) -- -- -- -- -- --    05/04/25 2129 -- 100 92 % 20 168/99 6 --             Physical Exam  Constitutional:       Appearance: She is well-developed. She is obese.   HENT:      Head: Normocephalic and atraumatic.   Eyes:      Conjunctiva/sclera: Conjunctivae normal.      Pupils: Pupils are equal, round, and reactive to light.   Cardiovascular:      Rate and Rhythm: Normal rate.   Pulmonary:      Effort: Pulmonary effort is normal.   Abdominal:      Palpations: Abdomen is soft.      Tenderness: There is generalized abdominal tenderness.   Musculoskeletal:         General: Normal range of motion.      Cervical back: Normal range of motion and neck supple.   Skin:     General: Skin is warm and dry.   Neurological:      Mental Status: She is alert and oriented to person, place, and time.         Results Reviewed       Procedure Component Value Units Date/Time    Lipase [[id number]]  (Normal) Collected: 05/04/25 2209    Lab Status: Final result Specimen: Blood from Arm, Right Updated: 05/04/25 2300     Lipase 12 u/L     Comprehensive metabolic panel [[id number]]  (Abnormal) Collected: 05/04/25 2209    Lab Status: Final result Specimen: Blood from Arm, Right Updated: 05/04/25 2300     Sodium 140 mmol/L      Potassium 3.8 mmol/L      Chloride 101 mmol/L      CO2 29 mmol/L      ANION GAP 10 mmol/L      BUN 21 mg/dL      Creatinine 1.71 mg/dL      Glucose 143 mg/dL      Calcium 10.2 mg/dL       AST 24 U/L      ALT 22 U/L      Alkaline Phosphatase 75 U/L      Total Protein 7.7 g/dL      Albumin 4.3 g/dL      Total Bilirubin 0.05 mg/dL      eGFR 31 ml/min/1.73sq m     Narrative:      [employer] guidelines for Chronic Kidney Disease (CKD):     Stage 1 with normal or high GFR (GFR > 90 mL/min/1.73 square meters)    Stage 2 Mild CKD (GFR = 60-89 mL/min/1.73 square meters)    Stage 3A Moderate CKD (GFR = 45-59 mL/min/1.73 square meters)    Stage 3B Moderate CKD (GFR = 30-44 mL/min/1.73 square meters)    Stage 4 Severe CKD (GFR = 15-29 mL/min/1.73 square meters)    Stage 5 End Stage CKD (GFR <15 mL/min/1.73 square meters)  Note: GFR calculation is accurate only with a steady state creatinine    Lactic acid, plasma (w/reflex if result > 2.0) [[id number]]  (Normal) Collected: 05/04/25 2209    Lab Status: Final result Specimen: Blood from Arm, Right Updated: 05/04/25 2258     LACTIC ACID 1.7 mmol/L     Narrative:      Result may be elevated if tourniquet was used during collection.    CBC and differential [[id number]]  (Abnormal) Collected: 05/04/25 2209    Lab Status: Final result Specimen: Blood from Arm, Right Updated: 05/04/25 2221     WBC 10.64 Thousand/uL      RBC 4.29 Million/uL      Hemoglobin 13.4 g/dL      Hematocrit 41.1 %      MCV 96 fL      MCH 31.2 pg      MCHC 32.6 g/dL      RDW 13.6 %      MPV 9.7 fL      Platelets 270 Thousands/uL      nRBC 0 /100 WBCs      Segmented % 71 %      Immature Grans % 0 %      Lymphocytes % 19 %      Monocytes % 7 %      Eosinophils Relative 2 %      Basophils Relative 1 %      Absolute Neutrophils 7.55 Thousands/µL      Absolute Immature Grans 0.04 Thousand/uL      Absolute Lymphocytes 1.97 Thousands/µL      Absolute Monocytes 0.77 Thousand/µL      Eosinophils Absolute 0.22 Thousand/µL      Basophils Absolute 0.09 Thousands/µL             CT abdomen pelvis wo contrast   Final Interpretation by Basilio England MD (05/05 0007)      Constipation with  rectal fecal impaction and findings suggestive of stercoral colitis      Workstation performed: [id number]             Procedures    ED Medication and Procedure Management   Prior to Admission Medications   Prescriptions Last Dose Informant Patient Reported? Taking?   acetaminophen (TYLENOL) 650 mg CR tablet   Yes No   Sig: Take 650 mg by mouth every 8 (eight) hours as needed for mild pain   fluocinonide (LIDEX) 0.05 % external solution   Yes No   Sig: Apply 1 Application topically 2 (two) times a day as needed for irritation (for R ear itching)   gabapentin (NEURONTIN) 100 mg capsule   Yes No   Sig: Take 300 mg by mouth 2 (two) times a day   levothyroxine 100 mcg tablet   Yes No   Sig: Take 88 mcg by mouth daily   metFORMIN (GLUCOPHAGE) 850 mg tablet   Yes No   Sig: Take 1 tablet by mouth 2 (two) times a day with meals   metoprolol succinate (TOPROL-XL) 25 mg 24 hr tablet   Yes No   Sig: Take 2 tablets by mouth 2 (two) times a day   ondansetron (ZOFRAN) 4 mg tablet   No No   Sig: Take 1 tablet (4 mg total) by mouth every 8 (eight) hours as needed for nausea or vomiting   predniSONE 1 mg tablet   Yes No   Sig: Take 4 mg by mouth 2 (two) times a day with meals   pyridoxine (B-6) 100 MG tablet   Yes No   Sig: Take 1 tablet by mouth daily   tiZANidine (ZANAFLEX) 4 mg tablet   Yes No   Sig: Take 1 tablet by mouth every 6 (six) hours as needed      Facility-Administered Medications: None     Discharge Medication List as of 5/5/2025 12:48 AM        START taking these medications    Details   polyethylene glycol (MIRALAX) 17 g packet Take 17 g by mouth daily for 10 days, Starting Mon 5/5/2025, Until u 5/15/2025, Normal           CONTINUE these medications which have NOT CHANGED    Details   acetaminophen (TYLENOL) 650 mg CR tablet Take 650 mg by mouth every 8 (eight) hours as needed for mild pain, Historical Med      fluocinonide (LIDEX) 0.05 % external solution Apply 1 Application topically 2 (two) times a day as needed  for irritation (for R ear itching), Historical Med      gabapentin (NEURONTIN) 100 mg capsule Take 300 mg by mouth 2 (two) times a day, Starting Tue 11/30/2021, Historical Med      levothyroxine 100 mcg tablet Take 88 mcg by mouth daily, Historical Med      metFORMIN (GLUCOPHAGE) 850 mg tablet Take 1 tablet by mouth 2 (two) times a day with meals, Starting Wed 12/8/2021, Historical Med      metoprolol succinate (TOPROL-XL) 25 mg 24 hr tablet Take 2 tablets by mouth 2 (two) times a day, Starting Tue 9/21/2021, Historical Med      ondansetron (ZOFRAN) 4 mg tablet Take 1 tablet (4 mg total) by mouth every 8 (eight) hours as needed for nausea or vomiting, Starting Mon 10/14/2024, Normal      predniSONE 1 mg tablet Take 4 mg by mouth 2 (two) times a day with meals, Historical Med      pyridoxine (B-6) 100 MG tablet Take 1 tablet by mouth daily, Starting Wed 6/30/2021, Historical Med      tiZANidine (ZANAFLEX) 4 mg tablet Take 1 tablet by mouth every 6 (six) hours as needed, Starting Wed 12/8/2021, Historical Med           No discharge procedures on file.  ED SEPSIS DOCUMENTATION   Time reflects when diagnosis was documented in both MDM as applicable and the Disposition within this note       Time User Action Codes Description Comment    5/5/2025 12:47 AM Mery Box Add [K59.03] Drug-induced constipation                  Mery Box DO  05/05/25 0136